# Patient Record
Sex: MALE | Race: WHITE | NOT HISPANIC OR LATINO | Employment: OTHER | URBAN - METROPOLITAN AREA
[De-identification: names, ages, dates, MRNs, and addresses within clinical notes are randomized per-mention and may not be internally consistent; named-entity substitution may affect disease eponyms.]

---

## 2017-01-11 DIAGNOSIS — I48.91 ATRIAL FIBRILLATION (HCC): ICD-10-CM

## 2017-01-11 DIAGNOSIS — Z79.01 LONG TERM CURRENT USE OF ANTICOAGULANT: ICD-10-CM

## 2017-01-11 DIAGNOSIS — I35.0 NONRHEUMATIC AORTIC VALVE STENOSIS: ICD-10-CM

## 2017-01-12 ENCOUNTER — LAB CONVERSION - ENCOUNTER (OUTPATIENT)
Dept: OTHER | Facility: OTHER | Age: 77
End: 2017-01-12

## 2017-01-12 LAB
INR PPP: 2
PROTHROMBIN TIME: 20.2 SEC (ref 9–11.5)

## 2017-02-22 DIAGNOSIS — Z79.01 LONG TERM CURRENT USE OF ANTICOAGULANT: ICD-10-CM

## 2017-02-22 DIAGNOSIS — I48.91 ATRIAL FIBRILLATION (HCC): ICD-10-CM

## 2017-02-22 DIAGNOSIS — I35.0 NONRHEUMATIC AORTIC VALVE STENOSIS: ICD-10-CM

## 2017-04-04 ENCOUNTER — APPOINTMENT (OUTPATIENT)
Dept: HEART AND VASCULAR | Facility: CLINIC | Age: 77
End: 2017-04-04

## 2017-04-04 VITALS
BODY MASS INDEX: 24.5 KG/M2 | WEIGHT: 175 LBS | SYSTOLIC BLOOD PRESSURE: 153 MMHG | HEART RATE: 63 BPM | HEIGHT: 71 IN | DIASTOLIC BLOOD PRESSURE: 67 MMHG

## 2017-04-05 DIAGNOSIS — I48.91 ATRIAL FIBRILLATION (HCC): ICD-10-CM

## 2017-04-05 DIAGNOSIS — I35.0 NONRHEUMATIC AORTIC VALVE STENOSIS: ICD-10-CM

## 2017-04-05 DIAGNOSIS — Z79.01 LONG TERM CURRENT USE OF ANTICOAGULANT: ICD-10-CM

## 2017-05-26 ENCOUNTER — LAB CONVERSION - ENCOUNTER (OUTPATIENT)
Dept: OTHER | Facility: OTHER | Age: 77
End: 2017-05-26

## 2017-05-26 LAB
INR PPP: 2.4
PROTHROMBIN TIME: 24.7 SEC (ref 9–11.5)

## 2017-05-31 ENCOUNTER — ALLSCRIPTS OFFICE VISIT (OUTPATIENT)
Dept: OTHER | Facility: OTHER | Age: 77
End: 2017-05-31

## 2017-05-31 ENCOUNTER — GENERIC CONVERSION - ENCOUNTER (OUTPATIENT)
Dept: OTHER | Facility: OTHER | Age: 77
End: 2017-05-31

## 2017-05-31 DIAGNOSIS — I42.9 CARDIOMYOPATHY (HCC): ICD-10-CM

## 2017-05-31 DIAGNOSIS — E78.00 PURE HYPERCHOLESTEROLEMIA: ICD-10-CM

## 2017-06-28 ENCOUNTER — LAB CONVERSION - ENCOUNTER (OUTPATIENT)
Dept: OTHER | Facility: OTHER | Age: 77
End: 2017-06-28

## 2017-06-28 ENCOUNTER — GENERIC CONVERSION - ENCOUNTER (OUTPATIENT)
Dept: OTHER | Facility: OTHER | Age: 77
End: 2017-06-28

## 2017-06-28 LAB
A/G RATIO (HISTORICAL): 1.6 (CALC) (ref 1–2.5)
ALBUMIN SERPL BCP-MCNC: 4.2 G/DL (ref 3.6–5.1)
ALP SERPL-CCNC: 114 U/L (ref 40–115)
ALT SERPL W P-5'-P-CCNC: 28 U/L (ref 9–46)
AST SERPL W P-5'-P-CCNC: 31 U/L (ref 10–35)
BASOPHILS # BLD AUTO: 0.4 %
BASOPHILS # BLD AUTO: 31 CELLS/UL (ref 0–200)
BILIRUB SERPL-MCNC: 0.9 MG/DL (ref 0.2–1.2)
BUN SERPL-MCNC: 18 MG/DL (ref 7–25)
BUN/CREA RATIO (HISTORICAL): 13 (CALC) (ref 6–22)
CALCIUM SERPL-MCNC: 9.4 MG/DL (ref 8.6–10.3)
CHLORIDE SERPL-SCNC: 106 MMOL/L (ref 98–110)
CHOLEST SERPL-MCNC: 141 MG/DL (ref 125–200)
CHOLEST/HDLC SERPL: 2.8 (CALC)
CO2 SERPL-SCNC: 24 MMOL/L (ref 20–31)
CREAT SERPL-MCNC: 1.39 MG/DL (ref 0.7–1.18)
DEPRECATED RDW RBC AUTO: 14.4 % (ref 11–15)
EGFR AFRICAN AMERICAN (HISTORICAL): 56 ML/MIN/1.73M2
EGFR-AMERICAN CALC (HISTORICAL): 49 ML/MIN/1.73M2
EOSINOPHIL # BLD AUTO: 0.8 %
EOSINOPHIL # BLD AUTO: 62 CELLS/UL (ref 15–500)
GAMMA GLOBULIN (HISTORICAL): 2.7 G/DL (CALC) (ref 1.9–3.7)
GLUCOSE (HISTORICAL): 101 MG/DL (ref 65–99)
HCT VFR BLD AUTO: 43.7 % (ref 38.5–50)
HDLC SERPL-MCNC: 50 MG/DL
HGB BLD-MCNC: 14.2 G/DL (ref 13.2–17.1)
LDL CHOLESTEROL (HISTORICAL): 68 MG/DL (CALC)
LYMPHOCYTES # BLD AUTO: 2020 CELLS/UL (ref 850–3900)
LYMPHOCYTES # BLD AUTO: 25.9 %
MCH RBC QN AUTO: 29.4 PG (ref 27–33)
MCHC RBC AUTO-ENTMCNC: 32.5 G/DL (ref 32–36)
MCV RBC AUTO: 90.3 FL (ref 80–100)
MONOCYTES # BLD AUTO: 569 CELLS/UL (ref 200–950)
MONOCYTES (HISTORICAL): 7.3 %
NEUTROPHILS # BLD AUTO: 5117 CELLS/UL (ref 1500–7800)
NEUTROPHILS # BLD AUTO: 65.6 %
NON-HDL-CHOL (CHOL-HDL) (HISTORICAL): 91 MG/DL (CALC)
PLATELET # BLD AUTO: 184 THOUSAND/UL (ref 140–400)
PMV BLD AUTO: 9.4 FL (ref 7.5–12.5)
POTASSIUM SERPL-SCNC: 4.3 MMOL/L (ref 3.5–5.3)
RBC # BLD AUTO: 4.84 MILLION/UL (ref 4.2–5.8)
SODIUM SERPL-SCNC: 141 MMOL/L (ref 135–146)
TOTAL PROTEIN (HISTORICAL): 6.9 G/DL (ref 6.1–8.1)
TRIGL SERPL-MCNC: 115 MG/DL
TSH SERPL DL<=0.05 MIU/L-ACNC: 1.32 MIU/L (ref 0.4–4.5)
WBC # BLD AUTO: 7.8 THOUSAND/UL (ref 3.8–10.8)

## 2017-07-28 DIAGNOSIS — I48.91 ATRIAL FIBRILLATION (HCC): ICD-10-CM

## 2017-07-28 DIAGNOSIS — Z79.01 LONG TERM CURRENT USE OF ANTICOAGULANT: ICD-10-CM

## 2017-08-12 ENCOUNTER — LAB CONVERSION - ENCOUNTER (OUTPATIENT)
Dept: OTHER | Facility: OTHER | Age: 77
End: 2017-08-12

## 2017-08-12 LAB
INR PPP: 2.9
PROTHROMBIN TIME: 29.1 SEC (ref 9–11.5)

## 2017-08-30 ENCOUNTER — LAB CONVERSION - ENCOUNTER (OUTPATIENT)
Dept: OTHER | Facility: OTHER | Age: 77
End: 2017-08-30

## 2017-08-30 LAB
INR PPP: 3.8
PROTHROMBIN TIME: 38.8 SEC (ref 9–11.5)

## 2017-09-14 ENCOUNTER — LAB CONVERSION - ENCOUNTER (OUTPATIENT)
Dept: OTHER | Facility: OTHER | Age: 77
End: 2017-09-14

## 2017-09-14 LAB
INR PPP: 4.7
PROTHROMBIN TIME: 47.9 SEC (ref 9–11.5)

## 2017-09-27 ENCOUNTER — ALLSCRIPTS OFFICE VISIT (OUTPATIENT)
Dept: OTHER | Facility: OTHER | Age: 77
End: 2017-09-27

## 2017-09-27 DIAGNOSIS — I25.10 ATHEROSCLEROTIC HEART DISEASE OF NATIVE CORONARY ARTERY WITHOUT ANGINA PECTORIS: ICD-10-CM

## 2017-09-27 DIAGNOSIS — I47.2 VENTRICULAR TACHYCARDIA (HCC): ICD-10-CM

## 2017-09-27 DIAGNOSIS — E78.00 PURE HYPERCHOLESTEROLEMIA: ICD-10-CM

## 2017-09-27 DIAGNOSIS — I10 ESSENTIAL (PRIMARY) HYPERTENSION: ICD-10-CM

## 2017-10-03 ENCOUNTER — APPOINTMENT (OUTPATIENT)
Dept: HEART AND VASCULAR | Facility: CLINIC | Age: 77
End: 2017-10-03
Payer: OTHER MISCELLANEOUS

## 2017-10-03 VITALS
SYSTOLIC BLOOD PRESSURE: 140 MMHG | BODY MASS INDEX: 24.5 KG/M2 | HEART RATE: 84 BPM | DIASTOLIC BLOOD PRESSURE: 76 MMHG | HEIGHT: 71 IN | WEIGHT: 175 LBS

## 2017-10-03 PROCEDURE — 93282 PRGRMG EVAL IMPLANTABLE DFB: CPT
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
## 2017-10-28 ENCOUNTER — GENERIC CONVERSION - ENCOUNTER (OUTPATIENT)
Dept: OTHER | Facility: OTHER | Age: 77
End: 2017-10-28

## 2017-11-16 DIAGNOSIS — I35.9 NONRHEUMATIC AORTIC VALVE DISORDER: ICD-10-CM

## 2017-11-17 ENCOUNTER — GENERIC CONVERSION - ENCOUNTER (OUTPATIENT)
Dept: OTHER | Facility: OTHER | Age: 77
End: 2017-11-17

## 2017-11-30 DIAGNOSIS — E78.00 PURE HYPERCHOLESTEROLEMIA: ICD-10-CM

## 2017-11-30 DIAGNOSIS — I48.91 ATRIAL FIBRILLATION (HCC): ICD-10-CM

## 2017-12-06 ENCOUNTER — GENERIC CONVERSION - ENCOUNTER (OUTPATIENT)
Dept: OTHER | Facility: OTHER | Age: 77
End: 2017-12-06

## 2017-12-20 ENCOUNTER — ALLSCRIPTS OFFICE VISIT (OUTPATIENT)
Dept: OTHER | Facility: OTHER | Age: 77
End: 2017-12-20
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
## 2018-01-05 DIAGNOSIS — I48.91 ATRIAL FIBRILLATION (HCC): ICD-10-CM

## 2018-01-05 DIAGNOSIS — Z79.01 LONG TERM CURRENT USE OF ANTICOAGULANT: ICD-10-CM

## 2018-01-10 ENCOUNTER — GENERIC CONVERSION - ENCOUNTER (OUTPATIENT)
Dept: OTHER | Facility: OTHER | Age: 78
End: 2018-01-10

## 2018-01-10 NOTE — RESULT NOTES
Verified Results  (1) PT WITH INR 21Jun2016 01:38PM Precious Acevedo   REPORT COMMENT:  FASTING:NO     Test Name Result Flag Reference   INR 1 7 H    Reference Range                     0 9-1 1  Moderate-intensity Warfarin Therapy 2 0-3 0  Higher-intensity Warfarin Therapy   3 0-4 0   PT 17 3 sec H 9 0-11 5   For more information on this test, go to:  [email or web address]

## 2018-01-11 NOTE — RESULT NOTES
Verified Results  (1) PT WITH INR 22Apr2016 02:45PM Precious Aceevdo     Test Name Result Flag Reference   INR 1 5 H    Reference Range                     0 9-1 1  Moderate-intensity Warfarin Therapy 2 0-3 0  Higher-intensity Warfarin Therapy   3 0-4 0   PT 15 5 sec H 9 0-11 5   For more information on this test, go to:  [email or web address]

## 2018-01-11 NOTE — RESULT NOTES
Verified Results  (1) PT WITH INR 86EUG5248 01:34PM Precious Acevedo   REPORT COMMENT:  FASTING:NO     Test Name Result Flag Reference   INR 2 5 H    Reference Range                     0 9-1 1  Moderate-intensity Warfarin Therapy 2 0-3 0  Higher-intensity Warfarin Therapy   3 0-4 0   PT 26 6 sec H 9 0-11 5   For more information on this test, go to:  [email or web address]/[email or web address]

## 2018-01-12 NOTE — RESULT NOTES
Verified Results  (1) PT WITH INR 70Pxa0414 03:06PM Precious Acveedo   REPORT COMMENT:  FASTING:NO     Test Name Result Flag Reference   INR 5 8 H    Verified by repeat analysis  Reference Range                     0 9-1 1  Moderate-intensity Warfarin Therapy 2 0-3 0  Higher-intensity Warfarin Therapy   3 0-4 0   PT 58 1 sec H 9 0-11 5   Verified by repeat analysis  For more information on this test, go to:  [email or web address]

## 2018-01-12 NOTE — RESULT NOTES
Verified Results  * CT CHEST WO CONTRAST 56SAF7475 01:53PM Catherine Brenner Order Number: [id number]    - Patient Instructions: To schedule this appointment, please contact Central Scheduling at [phone]  Test Name Result Flag Reference   CT CHEST WO CONTRAST (Report)     CT CHEST WITHOUT IV CONTRAST     INDICATION: Follow-up aortic root dilatation  COMPARISON: CT performed March 2, 2011 from [facility]: CT examination of the chest was performed without intravenous contrast  Axial, sagittal and coronal reformatted images were submitted for interpretation  Coronal thick section MIP (maximal intensity projection) images were also created  This examination, like all CT scans performed in the [place], was performed utilizing techniques to minimize radiation dose exposure, including the use of iterative reconstruction and automated exposure control  FINDINGS:   LUNGS: Stable parenchymal scarring and small bullae  PLEURA: Stable subpleural fat  No significant pleural effusion  HEART/GREAT VESSELS: Mild cardiomegaly  Ascending thoracic aorta measures 4 5 cm, previously 4 5 cm in March 2011  Aortic atherosclerosis  Apparent stent in the left renal artery  Cardiac pacer lead present  MEDIASTINUM AND GÓMEZ: Unremarkable  CHEST WALL AND LOWER NECK:      VISUALIZED STRUCTURES IN THE UPPER ABDOMEN: Stable small low-attenuation lesions in the liver, likely cysts  OSSEOUS STRUCTURES: Prior median sternotomy  IMPRESSION:   Stable exam compared to March 2011       Workstation performed: [id number]     Signed by:    Laura Coleman DO   11/19/16

## 2018-01-12 NOTE — RESULT NOTES
Verified Results  ECHO COMPLETE WITH CONTRAST IF INDICATED [id number] 01:49PM Tatum Duggan Order Number: [id number]    - Patient Instructions: To schedule this appointment, please contact Central Scheduling at [phone]  For Maria Luisa Medrano, please call [phone]  Test Name Result Flag Reference   ECHO COMPLETE WITH CONTRAST IF INDICATED (Report)     [address] [facility]   [phone]     Transthoracic Echocardiogram   2D, M-mode, Doppler, and Color Doppler     Study date: 15-Nov-2016     Patient: Geovanni Irizarry   MR number: [id number]   Account number: [de-identified]   : 1940   Age: 67 years   Gender: Male   Status: Outpatient   Location: [facility]   Height: 71 in   Weight: 177 lb   BP: 152/ 82 mmHg     Indications: Aortic valve disorder  Diagnoses: I35 9 - Nonrheumatic aortic valve disorder, unspecified     Sonographer: Campos RCS   Primary Physician: Cecelia Patel MD   Referring Physician: Connie Anthony MD   Group: [facility]   Interpreting Physician: Connie Anthony MD     SUMMARY     LEFT VENTRICLE:   Ejection fraction was estimated to be 55 %  There were no regional wall motion abnormalities  There was mild to moderate concentric hypertrophy  RIGHT VENTRICLE:   The ventricle was mildly dilated  Estimated peak pressure was at least 25 mmHg  ICD lead noted  LEFT ATRIUM:   The atrium was mildly dilated  RIGHT ATRIUM:   The atrium was mildly dilated  AORTIC VALVE:   A mechanical prosthesis was present  It exhibited normal function  TRICUSPID VALVE:   There was mild regurgitation  AORTA:   The root exhibited mild dilatation  4 3 cm  HISTORY: PRIOR HISTORY: COPD  A Fib  CAD  V-Tach  Former smoker  Congestive   heart failure  Cardiomyopathy  Risk factors: hypertension and   medication-treated hypercholesterolemia   PRIOR PROCEDURES: ICD implantation  Mechanical prosthesis of aortic valve  PROCEDURE: The study was performed in the [address] [place] [address]  This   was a routine study  The transthoracic approach was used  The study included   complete 2D imaging, M-mode, complete spectral Doppler, and color Doppler  Images were obtained from the parasternal, apical, subcostal, and suprasternal   notch acoustic windows  Image quality was adequate  LEFT VENTRICLE: Size was normal  Ejection fraction was estimated to be 55 %  There were no regional wall motion abnormalities  There was mild to moderate   concentric hypertrophy  RIGHT VENTRICLE: The ventricle was mildly dilated  Systolic function was   normal  Wall thickness was normal  DOPPLER: Estimated peak pressure was at   least 25 mmHg  ICD lead noted  LEFT ATRIUM: The atrium was mildly dilated  RIGHT ATRIUM: The atrium was mildly dilated  MITRAL VALVE: Valve structure was normal  There was normal leaflet separation  DOPPLER: The transmitral velocity was within the normal range  There was no   evidence for stenosis  There was no regurgitation  AORTIC VALVE: A mechanical prosthesis was present  It exhibited normal   function  TRICUSPID VALVE: The valve structure was normal  There was normal leaflet   separation  DOPPLER: The transtricuspid velocity was within the normal range  There was no evidence for stenosis  There was mild regurgitation  PULMONIC VALVE: Leaflets exhibited normal thickness, no calcification, and   normal cuspal separation  DOPPLER: The transpulmonic velocity was within the   normal range  There was no regurgitation  PERICARDIUM: There was no pericardial effusion  The pericardium was normal in   appearance  AORTA: The root exhibited mild dilatation  4 3 cm  SYSTEM MEASUREMENT TABLES     Apical four chamber   4 chamber Left Atrium Volume Index; Planimetry; End Systole;  Apical four   chamber;: 21 cm2 Left Ventricular End Diastolic Volume; Method of Disks, Single   Plane; Apical four chamber;: 108 2 ml Left Ventricular End Systolic Volume;   Method of Disks, Single Plane; Apical four chamber;: 48 5 ml Right Atrium   Systolic Area; Planimetry; End Systole; Apical four chamber;: 20 16 cm2 Right   Ventricular Internal Diastolic Dimension; End Diastole; Apical four chamber;:   39 4 mm     Unspecified Scan Mode   AR Vmax; Regurgitant Flow; Diastole;: 288 2 cm/s   Aortic Root Diameter; End Systole;: 43 5 mm   Gradient Pressure, Peak; Simplified Bernoulli; Antegrade Flow; Systole;: 9 7   mm[Hg] Gradient pressure, average; Simplified Bernoulli; Antegrade Flow;   Systole;: 5 6 mm[Hg]   Left atrial diameter; End Diastole;: 40 1 mm   Interventricular Septum Diastolic Thickness; Teichholz; End Diastole;: 13 4 mm   Interventricular Septum Systolic Thickness; Teichholz; End Systole;: 18 3 mm   Left Ventricle Internal End Diastolic Dimension; Teichholz;: 48 2 mm   Left Ventricle Internal Systolic Dimension; Teichholz; End Systole;: 34 2 mm   Left Ventricle Posterior Wall Diastolic Thickness; Teichholz; End Diastole;:   13 7 mm Left Ventricle Posterior Wall Systolic Thickness; Teichholz; End   Systole;: 19 6 mm   Left Ventricular Ejection Fraction; Teichholz;: 55 7 %   Mitral Valve Area; Area by Pressure Half-Time; Systole;: 3 01 cm2   Mitral Valve E to A Ratio; Systole;: 1 37   Maximum Tricuspid valve regurgitation pressure gradient; Regurgitant Flow;   Systole;: 20 mm[Hg]     [facility]     Prepared and electronically signed by     Vilma Banks MD   Signed [id number] 17:03:27       Plan  Thoracic aortic ectasia    · * CT CHEST WO CONTRAST; Status:Need Information - Financial Authorization;   Requested for:[id number];

## 2018-01-14 NOTE — PROGRESS NOTES
REPORT NAME: Patient Visit Summary Report   VISIT DATE: 11/8/2016  VISIT TIME: 4:25 PM EST  PATIENT NAME: Ashlee Garvin   MEDICAL RECORD NUMBER: [id number]  SOCIAL SECURITY NUMBER:    YOB: 1940  AGE: 67  REFERRING PHYSICIAN: Precious Acevedo  SUPERVISING CLINICIAN: Precious Acevedo  HEALTH CARE PROFESSIONAL: Hector Ramachandran  PATIENT HOME ADDRESS: [address] [place] [address], [place] [address]  PATIENT HOME PHONE:  [phone]  DIAGNOSIS 1: Atrial Fibrillation / 427 31  DIAGNOSIS 2: Long-term (current) use of anticoagulants / V58 61  DIAGNOSIS 3:   DIAGNOSIS 4:   INR RANGE: 2 5 - 3 5  INR GOAL: 3  TREATMENT START DATE:   TREATMENT END  DATE:   NEXT VISIT:       VISIT RESULTS   ENCOUNTER NUMBER:   [facility]  TEST TYPE: Outside Lab (Venipuncture)  VISIT TYPE:   CURRENT INR: 2 4 PROTIME:   SPECIMEN COL AND RPT DATE: 11/8/2016 4:25 PM   EST    VITAL SIGNS  PULSE:  B/P:  WEIGHT:  HEIGHT:  TEMP:     CURRENT ANTICOAGULANT DOSING SCHEDULE  DOSE SIZE: 2 5mg    ANTICOAGULANT TYPE: COUMADIN  DOSING REGIMEN  Sun       Mon       Tues      Wed       Thurs     Fri       Sat   Total/Wk  5         5         2 5       7 5       5         5         2 5       32 5    PATIENT MEDICATION INSTRUCTION: Yes  PATIENT NUTRITIONAL COUNSELING: No  PATIENT BRUISING INSTRUCTION: No      LAST EDUCATION DATE:        PREVIOUS VISIT INFORMATION  VISITDATE   INR Goal  INR   Sun     Mon     Tues    Wed     Thurs   Fri  Sat     Total/wk  11/8/2016   3         2 4   5       5       2 5     7 5     5       5  2 5     32 5  11/8/2016   3         2 4   5        5       2 5     7 5     5       5  2 5     32 5  10/7/2016   3         2 8   5       5       2 5     7 5     5       5  2 5     32 5  8/3/2016    3         3     5       5       2 5     7 5     5       5  2 5     32 5    ADDITIONAL  PREVIOUS VISIT INFORMATION  VISITDATE   PRIMARY RX               DOSE      CrCl  11/8/2016   COUMADIN 2 5mg               11/8/2016   COUMADIN                 2 5mg               10/7/2016   COUMADIN                 2 5mg                8/3/2016    COUMADIN                 2 5mg                   OTHER CURRENT MEDICATIONS: COUMADIN      PROGRESS NOTES: Same dose  recheck 2 weeks    PATIENT INSTRUCTIONS: spoke with pt    TEST LOCATION: [facility]    Electronically signed by: Latrell Salmeron on 11/8/2016 at 4:25 PM EST              Electronically signed by:Precious CASTILLO    Nov 9 2016 12:16AM EST

## 2018-01-14 NOTE — RESULT NOTES
Verified Results  (1) PT WITH INR [id number] 10:29AM Precious Acevedo   REPORT COMMENT:  FASTING:NO     Test Name Result Flag Reference   INR 3 1 H    Reference Range                     0 9-1 1  Moderate-intensity Warfarin Therapy 2 0-3 0  Higher-intensity Warfarin Therapy   3 0-4 0   PT 31 7 sec H 9 0-11 5   For more information on this test, go to:  [email or web address]

## 2018-01-15 VITALS
HEIGHT: 71 IN | SYSTOLIC BLOOD PRESSURE: 138 MMHG | OXYGEN SATURATION: 98 % | HEART RATE: 64 BPM | WEIGHT: 175 LBS | DIASTOLIC BLOOD PRESSURE: 82 MMHG | BODY MASS INDEX: 24.5 KG/M2

## 2018-01-15 VITALS
BODY MASS INDEX: 24.5 KG/M2 | WEIGHT: 175 LBS | HEART RATE: 74 BPM | SYSTOLIC BLOOD PRESSURE: 154 MMHG | OXYGEN SATURATION: 98 % | HEIGHT: 71 IN | DIASTOLIC BLOOD PRESSURE: 80 MMHG

## 2018-01-15 NOTE — MISCELLANEOUS
Message  GI Reminder Recall [address] [place] [address] [address]:   Date: 06/01/2017   Dear Kenyetta Stokes:     Review of our records shows you are due for the following: Colonoscopy  Our records indicate that you are due at this time to have a follow-up examination for a colonoscopy  As you now, these tests are done to prevent colon cancer, a very common disease in the [place] and responsible for the thousands of patient deaths each year  We at [facility] are concerned for your health, and would very much appreciate you getting in touch with us at your earliest convenience, Again, this examination is vital to your proper health maintenance and for the prevention of cancer  Please call the following office to schedule your appointment:   [address] [phone]  We look forward to hearing from you!      Sincerely,         Signatures   Electronically signed by : Anne Marie Anderson, ; Jun 1 2017  8:31AM EST                       (Author)

## 2018-01-15 NOTE — RESULT NOTES
Verified Results  (1) PT WITH INR 01Apr2016 11:27AM Kristina Precious     Test Name Result Flag Reference   INR 2 10 L 2 500 - 3 500   REPORT NAME: Patient Visit Summary Report   VISIT DATE: 4/1/2016  VISIT TIME: 11:27 AM EDT  PATIENT NAME: Sandy Miller   MEDICAL RECORD NUMBER: [id number]  SOCIAL SECURITY NUMBER:   YOB: 1940  AGE: 67  REFERRING PHYSICIAN: Precious Acevedo  SUPERVISING CLINICIAN: Precious Acevedo  HEALTH CARE PROFESSIONAL: Addy Espitia   PATIENT HOME ADDRESS: [address] [place] [address]  PATIENT HOME PHONE: [phone]  DIAGNOSIS 1: Atrial Fibrillation / 427 31  DIAGNOSIS 2: Long-term (current) use of anticoagulants / V58 61  DIAGNOSIS 3:   DIAGNOSIS 4:   INR RANGE: 2 5 - 3 5  INR GOAL: 3  TREATMENT START DATE:   TREATMENT END DATE:   NEXT VISIT: 4/22/2016  [facility]    VISIT RESULTS   ENCOUNTER NUMBER:   TEST LOCATION: [facility]  TEST TYPE: Outside Lab (Venipuncture)  VISIT TYPE:   CURRENT INR: 2 1 PROTIME:   SPECIMEN COL AND RPT DATE: 4/1/2016 11:27 AM  EDT    VITAL SIGNS  PULSE:  B/P:  WEIGHT:  HEIGHT:  TEMP:     CURRENT ANTICOAGULANT DOSING SCHEDULE  DOSE SIZE: 2 5mg    ANTICOAGULANT TYPE: COUMADIN  DOSING REGIMEN  Sun       Mon       Tues      Wed       Thurs     Fri       Sat  Total/Wk  5         5         5         2 5       5         5         5         32 5    PATIENT MEDICATION INSTRUCTION: Yes  PATIENT NUTRITIONAL COUNSELING: No  PATIENT BRUISING INSTRUCTION: No      LAST EDUCATION DATE:       PREVIOUS VISIT INFORMATION  VISITDATE   INR Goal  INR   Sun     Mon     Tues    Wed     Thurs   Fri  Sat     Total/wk  4/1/2016    3         2 1   5       5       5       2 5     5       5  5       32 5  3/9/2016    3         3 1   5       5       5       2 5     5       5  5       32 5  2/9/2016    3         3 9   5       5       5       2 5     5       5  5       32 5  1/12/2016   3         2 5   5       5       5       2 5     5 5  5       32 5    ADDITIONAL PREVIOUS VISIT INFORMATION  VISITDATE   PRIMARY RX               DOSE      CrCl  4/1/2016    COUMADIN                 2 5mg               3/9/2016    COUMADIN                 2 5mg               2/9/2016    COUMADIN                 2 5mg               1/12/2016   COUMADIN                 2 5mg                   OTHER CURRENT MEDICATIONS: COUMADIN      PROGRESS NOTES: same dose   recheck 2-3 weeks - per AS    PATIENT INSTRUCTIONS: lmom on daughters cell#    TEST LOCATION: [facility]    Electronically signed by: Christopher Dubois  on 4/1/2016 at 11:27 AM EDT

## 2018-01-15 NOTE — RESULT NOTES
Verified Results  NM MYOCARDIAL PERFUSION SPECT (RX STRESS AND/OR REST) [id number] 12:39PM Sameera Urrutia Order Number: [id number]     Test Name Result Flag Reference   NM MYOCARDIAL PERFUSION SPECT (RX STRESS AND/OR REST) (Report)     [facility]   [address] [place] [address]   [phone]     Rest/Stress Gated SPECT Myocardial Perfusion Imaging After Regadenoson     Patient: Pollo Cobb   MR number: [id number]   Account number: [de-identified]   : 1940   Age: 67 years   Gender: Male   Status: Outpatient   Location: [facility]   Height: 71 in   Weight: 177 lb   BP: 146/ 74 mmHg     Allergies: ALLERGIES NOT ON FILE     Diagnosis: I25 10 - Atherosclerotic heart disease of native coronary artery   without angina pectoris, I48 0 - Atrial fibrillation     Primary Physician: Aiyana Bangura MD   Referring Physician: Shashi Sánchez MD   Technician: Chino Don BS, BA, AART(N)   Group: [facility]   Other: Bruce Steven MS, CCT   Interpreting Physician: Roland Longoria MD     INDICATIONS: Evaluation of known coronary artery disease  HISTORY: The patient is a 67year old  male  Chest pain status: no   chest pain  Coronary artery disease risk factors: dyslipidemia and   hypertension  Cardiovascular history: coronary artery disease, congestive heart   failure, aortic valve disease- AVR, and arrhythmia  Prior cardiovascular   procedures: implantable cardioverter defibrillator procedure  Co-morbidity:   history of lung disease  GERD, KELTON Medications: a beta blocker, a calcium   channel blocker, an ACE inhibitor/ARB, a diuretic, a lipid lowering agent,   warfarin, and an antiarrhythmic agent  PHYSICAL EXAM: Baseline physical exam screening: normal      REST ECG: Ventricular paced rhythm alternating with sinus rhythm with first   degree AV block       PROCEDURE: The study was performed at [address] [place] [address] [place]  A   regadenoson infusion pharmacologic stress test was performed  Gated SPECT   myocardial perfusion imaging was performed after stress and at rest  Systolic   blood pressure was 146 mmHg, at the start of the study  Diastolic blood   pressure was 74 mmHg, at the start of the study  The heart rate was 50 bpm, at   the start of the study  Patient was not experiencing chest pain at the time of   the injection of the radiopharmaceutical    Regadenoson protocol:   HR bpm SBP mmHg DBP mmHg Symptoms ST change Rhythm/conduct   Baseline 50 146 74 none none paced   Immediate 67 -- -- mild dyspnea same as above NSR, rare PAC's   1 min 75 134 84 subsiding same as above NSR, no ectopy   2 min 71 138 76 none same as above NSR, no ectopy   3 min 66 -- -- none same as above NSR, no ectopy   4 min 65 124 66 none same as above NSR, no ectopy   No medications or fluids given  STRESS SUMMARY: Duration of pharmacologic stress was 1 min  Maximal heart rate   during stress was 75 bpm  The heart rate response to stress was normal  There   was normal resting blood pressure with an appropriate response to stress  The   rate-pressure product for the peak heart rate and blood pressure was 89048  There was no chest pain during stress  The stress test was terminated due to   protocol completion  The stress ECG was negative for ischemia  Arrhythmia   during stress: isolated atrial premature beats  ISOTOPE ADMINISTRATION:   Resting isotope administration Stress isotope administration   Agent Sestamibi Sestamibi   Dose 9 62 mCi --   Date 05/31/2016 --     The radiopharmaceutical was injected at the peak effect of pharmacologic   stress  MYOCARDIAL PERFUSION IMAGING:   The image quality was good  The left ventricle was mildly dilated  PERFUSION DEFECTS:   - There were no perfusion defects     A perfusion defect in the basal-mid inferior wall was seen in the rest images   but not seen in the stress images indicating that it was due to an artifact  GATED SPECT:   The calculated left ventricular ejection fraction was 48 %  Left ventricular   ejection fraction was mildly decreased by visual estimate  There was mildly   reduced myocardial thickening and motion of the LV with asynchronous septal   motion suggestive of paced rhythm  SUMMARY:   - Stress results: There was no chest pain during stress  - ECG conclusions: The stress ECG was negative for ischemia  Arrhythmia during   stress: isolated atrial premature beats  - Perfusion imaging: There were no perfusion defects  A perfusion defect in the basal-mid inferior wall was seen in the rest images   but not seen in the stress images indicating that it was due to an artifact  -    Gated SPECT: The calculated left ventricular ejection fraction was 48 %  Left   ventricular ejection fraction was mildly decreased by visual estimate  There   was mildly reduced myocardial thickening and motion of the LV with asynchronous   septal motion suggestive of paced rhythm  IMPRESSIONS: Normal study after pharmacologic stress  Myocardial perfusion   imaging was normal at rest and with stress  Left ventricular systolic function   was reduced, with regional wall motion abnormalities (asynchronous septal   motion)       Prepared and signed by     Linnea Villanueva MD   Signed 05/31/2016 17:42:32

## 2018-01-15 NOTE — RESULT NOTES
Verified Results  (1) PT WITH INR [id number] 01:09PM Precious Acevedo     Test Name Result Flag Reference   INR 2 2 H    Reference Range                     0 9-1 1  Moderate-intensity Warfarin Therapy 2 0-3 0  Higher-intensity Warfarin Therapy   3 0-4 0   PT 21 8 sec H 9 0-11 5   For more information on this test, go to:  [email or web address]

## 2018-01-16 NOTE — RESULT NOTES
Verified Results  (1) CBC/PLT/DIFF [id number] 11:10AM [facility]     Test Name Result Flag Reference   WHITE BLOOD CELL COUNT 8 7 Thousand/uL  3 8-10 8   RED BLOOD CELL COUNT 4 84 Million/uL  4 20-5 80   HEMOGLOBIN 14 1 g/dL  13 2-17 1   HEMATOCRIT 44 5 %  38 5-50 0   MCV 91 9 fL  80 0-100 0   MCH 29 1 pg  27 0-33 0   EOSINOPHILS 1 0 %     BASOPHILS 0 2 %     ABSOLUTE MONOCYTES 748 cells/uL  200-950   ABSOLUTE EOSINOPHILS 87 cells/uL     ABSOLUTE BASOPHILS 17 cells/uL  0-200   NEUTROPHILS 73 5 %     LYMPHOCYTES 16 7 %     MONOCYTES 8 6 %     MCHC 31 7 g/dL L 32 0-36 0   RDW 14 6 %  11 0-15 0   PLATELET COUNT 598 Thousand/uL  140-400   MPV 9 5 fL  7 5-11 5   ABSOLUTE NEUTROPHILS 6395 cells/uL  7533-9911   ABSOLUTE LYMPHOCYTES 1453 cells/uL  850-3900     (1) COMPREHENSIVE METABOLIC PANEL [id number] 82:89UU [facility]     Test Name Result Flag Reference   GLUCOSE 99 mg/dL  65-99   Fasting reference interval   UREA NITROGEN (BUN) 22 mg/dL  7-25   CREATININE 1 21 mg/dL H 0 70-1 18   For patients >52years of age, the reference limit  for Creatinine is approximately 13% higher for people  identified as -American  eGFR NON-AFR   AMERICAN 57 mL/min/1 73m2 L > OR = 60   eGFR AFRICAN AMERICAN 67 mL/min/1 73m2  > OR = 60   AST 26 U/L  10-35   ALT 29 U/L  9-46   PROTEIN, TOTAL 6 3 g/dL  6 1-8 1   ALBUMIN 3 9 g/dL  3 6-5 1   GLOBULIN 2 4 g/dL (calc)  1 9-3 7   ALBUMIN/GLOBULIN RATIO 1 6 (calc)  1 0-2 5   BILIRUBIN, TOTAL 0 8 mg/dL  0 2-1 2   ALKALINE PHOSPHATASE 67 U/L     BUN/CREATININE RATIO 18 (calc)  6-22   SODIUM 139 mmol/L  135-146   POTASSIUM 4 2 mmol/L  3 5-5 3   CHLORIDE 105 mmol/L     CARBON DIOXIDE 28 mmol/L  20-31   CALCIUM 9 1 mg/dL  8 6-10 3     (1) LIPID PANEL, FASTING [id number] 11:10AM [facility]     Test Name Result Flag Reference   CHOLESTEROL, TOTAL 189 mg/dL  125-200   HDL CHOLESTEROL 54 mg/dL  > OR = 40   TRIGLICERIDES 281 mg/dL H <150   LDL-CHOLESTEROL 95 mg/dL (calc)  <130   Desirable range <100 mg/dL for patients with CHD or  diabetes and <70 mg/dL for diabetic patients with  known heart disease  CHOL/HDLC RATIO 3 5 (calc)  < OR = 5 0   NON HDL CHOLESTEROL 135 mg/dL (calc)     Target for non-HDL cholesterol is 30 mg/dL higher than   LDL cholesterol target  (1) PT WITH INR [id number] 11:10AM Precious Acevedo     Test Name Result Flag Reference   INR 3 4 H    Reference Range                     0 9-1 1  Moderate-intensity Warfarin Therapy 2 0-3 0  Higher-intensity Warfarin Therapy   3 0-4 0   PT 34 2 sec H 9 0-11 5   For more information on this test, go to:  [email or web address]/[email or web address]     (Q) TSH, 3RD GENERATION [id number] 11:10AM Precious Acevedo   REPORT COMMENT:  FASTING:YES     Test Name Result Flag Reference   TSH 0 43 mIU/L  0 40-4 50

## 2018-01-16 NOTE — MISCELLANEOUS
This is to state that this patient is followed by me from cardiac standpoint  He has multiple medical problems including cardiomyopathy, mechanical aortic valve replacement, history of ICD implantation  Patient also  has history of COPD, gastroesophageal reflux disease, hypertension and hyperlipidemia  Patient has had anxiety disorder associated with these medical problems for the past many years  He has been on alprazolam 0 25 milligrams twice a day as needed  This  has immensely help him with coping with all his medical illnesses  Patient also has been tried to wean off this anxiolytic  However, this has not been successful  In view of this, it is extremely important that this patient continue alprazolam 0 25mg  twice a day as needed  Discontinuation of this coverage will  put the patient's medical condition in jeopardy  If you have any specific questions, please do not hesitate to contact me in person  Electronically signed by:Precious CASTILLO    May  8 2016 10:46PM EST

## 2018-01-16 NOTE — RESULT NOTES
Verified Results  (1) PT WITH INR [id number] 09:16AM [facility]     Test Name Result Flag Reference   INR 2 40 L 2 500 - 3 500   REPORT NAME: Patient Visit Summary Report   VISIT DATE: 11/8/2016  VISIT TIME: 9:16 AM EST  PATIENT NAME: Dl Lomax   MEDICAL RECORD NUMBER: [id number]  SOCIAL SECURITY NUMBER:   YOB: 1940  AGE: 67  REFERRING PHYSICIAN: Precious Acevedo  SUPERVISING CLINICIAN: Precious Acevedo  HEALTH CARE PROFESSIONAL: Ambreen Rosas  PATIENT HOME ADDRESS: [address] [place] [address]  PATIENT HOME PHONE: [phone]  DIAGNOSIS 1: Atrial Fibrillation / 427 31  DIAGNOSIS 2: Long-term (current) use of anticoagulants / V58 61  DIAGNOSIS 3:   DIAGNOSIS 4:   INR RANGE: 2 5 - 3 5  INR GOAL: 3  TREATMENT START DATE:   TREATMENT END DATE:   NEXT VISIT:       VISIT RESULTS   ENCOUNTER NUMBER:   TEST LOCATION: [facility]  TEST TYPE: Outside Lab (Venipuncture)  VISIT TYPE:   CURRENT INR: 2 4 PROTIME:   SPECIMEN COL AND RPT DATE: 11/8/2016 9:16 AM  EST    VITAL SIGNS  PULSE:  B/P:  WEIGHT:  HEIGHT:  TEMP:     CURRENT ANTICOAGULANT DOSING SCHEDULE  DOSE SIZE: 2 5mg    ANTICOAGULANT TYPE: COUMADIN  DOSING REGIMEN  Sun       Mon       Tues      Wed       Thurs     Fri       Sat  Total/Wk  5         5         2 5       7 5       5         5         2 5       32 5    PATIENT MEDICATION INSTRUCTION: Yes  PATIENT NUTRITIONAL COUNSELING: No  PATIENT BRUISING INSTRUCTION: No      LAST EDUCATION DATE:       PREVIOUS VISIT INFORMATION  VISITDATE   INR Goal  INR   Sun     Mon     Tues    Wed     Thurs   Fri  Sat     Total/wk  11/8/2016   3         2 4   5       5       2 5     7 5     5       5  2 5     32 5  10/7/2016   3         2 8   5       5       2 5     7 5     5       5  2 5     32 5  8/3/2016    3         3     5       5       2 5     7 5     5       5  2 5     32 5  6/22/2016   3         1 7   5       5       2 5     7 5     5       5  2 5 32  5    ADDITIONAL PREVIOUS VISIT INFORMATION  VISITDATE   PRIMARY RX               DOSE      CrCl  11/8/2016   COUMADIN                 2 5mg               10/7/2016   COUMADIN                 2 5mg               8/3/2016    COUMADIN                 2 5mg               6/22/2016   COUMADIN                 2 5mg                   OTHER CURRENT MEDICATIONS: COUMADIN      PROGRESS NOTES: Same dose rechk 1 month    PATIENT INSTRUCTIONS: Spoke with daughter    TEST LOCATION: [facility]    Electronically signed by: Vanesa Galvez on 11/8/2016 at 9:16 AM EST

## 2018-01-17 NOTE — RESULT NOTES
Verified Results  (1) LIPID PANEL, FASTING [id number] 12:14PM [facility]     Test Name Result Flag Reference   CHOLESTEROL, TOTAL 141 mg/dL  125-200   HDL CHOLESTEROL 50 mg/dL  > OR = 40   TRIGLICERIDES 456 mg/dL  <150   LDL-CHOLESTEROL 68 mg/dL (calc)  <130   Desirable range <100 mg/dL for patients with CHD or  diabetes and <70 mg/dL for diabetic patients with  known heart disease  CHOL/HDLC RATIO 2 8 (calc)  < OR = 5 0   NON HDL CHOLESTEROL 91 mg/dL (calc)     Target for non-HDL cholesterol is 30 mg/dL higher than   LDL cholesterol target  (1) COMPREHENSIVE METABOLIC PANEL [id number] 57:44OJ [facility]     Test Name Result Flag Reference   GLUCOSE 101 mg/dL H 65-99   Fasting reference interval     For someone without known diabetes, a glucose value  between 100 and 125 mg/dL is consistent with  prediabetes and should be confirmed with a  follow-up test    UREA NITROGEN (BUN) 18 mg/dL  7-25   CREATININE 1 39 mg/dL H 0 70-1 18   For patients >52years of age, the reference limit  for Creatinine is approximately 13% higher for people  identified as -American  eGFR NON-AFR   AMERICAN 49 mL/min/1 73m2 L > OR = 60   eGFR AFRICAN AMERICAN 56 mL/min/1 73m2 L > OR = 60   BUN/CREATININE RATIO 13 (calc)  6-22   SODIUM 141 mmol/L  135-146   POTASSIUM 4 3 mmol/L  3 5-5 3   CHLORIDE 106 mmol/L     CARBON DIOXIDE 24 mmol/L  20-31   CALCIUM 9 4 mg/dL  8 6-10 3   PROTEIN, TOTAL 6 9 g/dL  6 1-8 1   ALBUMIN 4 2 g/dL  3 6-5 1   GLOBULIN 2 7 g/dL (calc)  1 9-3 7   ALBUMIN/GLOBULIN RATIO 1 6 (calc)  1 0-2 5   BILIRUBIN, TOTAL 0 9 mg/dL  0 2-1 2   ALKALINE PHOSPHATASE 114 U/L     AST 31 U/L  10-35   ALT 28 U/L  9-46     (1) CBC/PLT/DIFF [id number] 12:14PM [facility]     Test Name Result Flag Reference   WHITE BLOOD CELL COUNT 7 8 Thousand/uL  3 8-10 8   RED BLOOD CELL COUNT 4 84 Million/uL  4 20-5 80   HEMOGLOBIN 14 2 g/dL  13 2-17 1   HEMATOCRIT 43 7 %  38 5-50 0   MCV 90 3 fL  80 0-100 0 MCH 29 4 pg  27 0-33 0   MCHC 32 5 g/dL  32 0-36 0   RDW 14 4 %  11 0-15 0   PLATELET COUNT 857 Thousand/uL  140-400   ABSOLUTE NEUTROPHILS 5117 cells/uL  6173-1029   ABSOLUTE LYMPHOCYTES 2020 cells/uL  850-3900   ABSOLUTE MONOCYTES 569 cells/uL  200-950   ABSOLUTE EOSINOPHILS 62 cells/uL     ABSOLUTE BASOPHILS 31 cells/uL  0-200   NEUTROPHILS 65 6 %     LYMPHOCYTES 25 9 %     MONOCYTES 7 3 %     EOSINOPHILS 0 8 %     BASOPHILS 0 4 %     MPV 9 4 fL  7 5-12 5     (Q) TSH, 3RD GENERATION W/REFLEX TO FT4 [id number] 12:14PM Precious Acevedo   REPORT COMMENT:  FASTING:YES     Test Name Result Flag Reference   TSH W/REFLEX TO FT4 1 32 mIU/L  0 40-4 50

## 2018-01-17 NOTE — MISCELLANEOUS
This is to state that this patient has been followed by me for the past many years  He has history of ventricular tachycardia status post ICD implantation on amiodarone prophylaxis  He also has history of mechanical  aortic valve replacement on anticoagulation  In addition he has history of COPD, gastroesophageal reflux disease as well as hyperlipidemia, hypertension and anxiety  He has been followed on a regular basis and seen every 3 months  Patient has been well  maintained on medical therapy  Patient denies any history of chest pain  Does have some shortness of breath which is stable  Anxiety is well controlled on medications as well as emotional support  No history of palpitations, lightheadedness or dizziness  No history presyncope, syncope  No history of ICD discharges  Patient was seen in the office on 9/27/2017  On physical examination pulse 74 beats per minute blood pressure 150/70 mmHg  No pallor  No icterus  No JVD  No carotid bruit  Lungs are  clear with decreased breath sounds  No crackles or wheezes  Cardiac examination reveals regular rate and rhythm with heart sounds consistent with prosthetic valve  Abdomen soft, nontender  No hepatosplenomegaly  ICD noted in the abdominal area  Extremities  with no edema  Overall , Patient has been doing well from cardiac standpoint  He has been on the following medications with the specific indications as described  Patient is on amiodarone prophylaxis for history of paroxysmal ventricular  tachycardia  Patient will have followup labs including thyroid function tests and liver function tests  Blood work has been ordered   On amiodarone  Advair has to be continued given history of COPD  As mentioned, patient regularly followed by  Мария Pathak, his pulmonologist     Alprazolam for anxiety  Patient has done well with this medication   Discontinuation of this medication may put the patient's health in jeopardy as patient has been on this medication for many years  Amoxicillin  as needed for antibiotic prophylaxis for prosthetic mechanical aortic valve   For dental procedures  Pantoprazole for gastroesophageal reflux disease as well as to prevent gastritis , especially since patient is on multiple medications including  Coumadin  Patient is on Coumadin for anticoagulation for prosthetic mechanical aortic valve  Regular PT/INR monitoring  Patient understands the risks and benefits of anticoagulation  Atorvastatin will be continued for hyperlipidemia  Simvastatin  was discontinued secondary to concerns with drug interactions with Coumadin and amiodarone  Patient will need an echocardiogram in November to reassess ejection fraction and assess mechanical prosthetic aortic valve  Atenolol, spironolactone   as well as amlodipine/benazepril to be continued for hypertension  If you have any specific questions regarding this patient, please do not hesitate to contact me in person  Electronically signed by:Precious CASTILLO    Oct 28 2017 11:17AM EST

## 2018-01-17 NOTE — RESULT NOTES
Reason For Visit  ROBER MÉNDEZ is a(n) established patient here today for a 2 mo C.   Patient accompanied by mother  and grandparent .      Quality    Pediatric Wellness CI height documented, discussion of nutritional quality of diet, patient accompanied by mother and Spring was offered to mother.      History of Present Illness  General Health: The child's health since the last visit is described as good.   Caregiver concerns: Caregivers deny concerns regarding nutrition, sleep, behavior, , development and elimination.   Nutrition/Elimination: Current diet includes breast feeding and nursing about 20 minutes each breast every 2 to 3 hours, rarely giving formula.     Dietary supplements: vitamin D.   Maternal Diet: Maternal diet was reviewed and was appropriate for breast feeding.   Elimination: No elimination issues are expressed.   Sleep:. No sleep issues are reported. He sleeps in a crib on his back.   Behavior: No behavior issues identified.   Safety elements used:. safety elements were discussed and are adequate.     He is nursing very well,   stools are soft,   but lately more gassy in the evening, normal mother's diet  He was just seen by urologist and surgery is postponed for later.  Mother is concerned with white of eyes still being slightly yellow, never were white.        Developmental Milestones  Developmental Milestones - 2 month (As reported by parent or witnessed during visit)   Social-Emotional: smiles, looks at parent and self comforts.   Cognitive: indicates boredom when there is no activity change.   Communicative: coos and has different cries for different needs.   Physical Development: can lift head and begin to push up when prone, can hold head erect for short periods (when held upright), diminished  reflexes and there is symmetric movement.        Review of Systems    Const: Normal.   Head: Normal.   Eyes: Per HPI.   ENT: Normal.   Neck: Normal.   CV: Normal.   Resp:  Verified Results  (Q) LYME DISEASE AB, TOTAL W/REFL WB (IGG, IGM) [id number] 02:19PM Precious Acevedo   REPORT COMMENT:  FASTING:NO     Test Name Result Flag Reference   LYME AB SCREEN < OR = 0 90 index     Index                 Interpretation                     -----                 --------------                     < or = 0 90           Negative                     0  91-1 09             Equivocal                     > or = 1 10           Positive     The use of purified VlsE-1 and PepC10 antigens in this  assay provides improved specificity compared to assays  that utilize whole cell lysates of B  burgdorferi, the  causative agent of Lyme disease, and slightly better  sensitivity compared to the C6 antibody assay  As recommended by the Food and Drug   Administration (FDA), all samples with positive or   equivocal results in a Borrelia burgdorferi antibody    EIA (screening) will be tested using a blot method  Positive or equivocal screening test results should not   be interpreted as truly positive until verified as such   using a supplemental assay (e g , B  burgdorferi blot)  The screening test and/or blot for B  burgdorferi   antibodies may be falsely negative in early stages of   Lyme disease, including the period when erythema   migrans is apparent  Normal.   GI: Per HPI.   Neuro: Normal.   Musc: Normal.   Skin: Normal.   Heme/Immun: Normal.   : Per HPI.   Psych: Normal.   Endo: Normal.      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)  Spitting up  (P92.1)    Past Medical History  History of Health examination for  8 to 28 days old (Z00.111)  History of Stewart with fetal cardiac arrhythmia prior to birth (P03.819)    Surgical History  Denied: History Of Prior Surgery  no history of surgery    Family History  Problems   Family history of Allergy : Mother  Family history of Depression, postpartum : Mother  Family history of Elevated antibody levels : Mother  Family history of cardiac disorder (Z82.49)  Family history of hypertension (Z82.49)    Social History  Denied: History of Exposure to second hand smoke in pediatric patient  Lives with parents  Denied: History of Sibling    Review  Past medical history, problem list, family medical history, surgical history and social history reviewed.      Vitals  Vital Signs    Recorded: 2018 03:16PM   Height 1 ft 10.5 in   Weight 12 lb 3 oz   BMI Calculated 16.93   BSA Calculated 0.28   Premature Length Percentile 80 %   Premature Weight Percentile 95 %   Temperature 98.1 F   Head Circumference 38.5 cm   Premature Head Circumference Percentile 60 %     Physical Exam  Constitutional: well developed, well nourished, in no acute distress, interactive and current vital signs reviewed.   Head and Face: normocephalic and atraumatic. anterior fontanelle was open, soft and flat. posterior fontanelle was normal. the skull sutures were normal. no facial abnormalities were observed.   Eyes: no discharge, normal conjunctiva, no eyelid swelling and no ptosis .  slightly icteric sclera. pupils equal and reactive to light, normal red reflex and extraocular movements were intact.   ENT: normal appearing outer ear and normal appearing nose. examination of the tympanic membrane showed normal landmarks and normal appearing external canal. nasal mucosa moist and pink and no nasal discharge. normal lips and normal gums. oral mucosa pink and moist, no oral lesions, palate intact, normal appearing pharynx and normal appearing tongue.   Neck: normal appearing, supple and no torticollis. thyroid not enlarged.   Lymphatic: no lymphadenopathy.   Chest: normal breast appearance. normal chest appearance.   Pulmonary: no respiratory distress, normal respiratory rate and effort and no accessory muscle use. breath sounds clear to auscultation bilaterally.   Cardiovascular: normal rate, no murmurs were heard, regular rhythm, normal S1 and normal S2.   Abdomen: soft, nontender, nondistended, normal bowel sounds, no abdominal mass. no hepatomegaly, no splenomegaly. no umbilical hernia was discovered.   Musculoskeletal: no clubbing or cyanosis of the fingernails. no joint swelling seen and no joint tenderness was elicited. normal ROM of all extremities. no hip instability bilaterally. muscle strength and tone were normal, normal head control and normal grasp.   Genitourinary: the scrotum was normal, the testicles were not swollen, there were no testicular masses and testes were descended bilaterally. the penis was normal and no penile adhesions . the penis had not been circumcised. no inguinal hernia was discovered.   Neurologic: responds to light, strong cry, normal suck, responds to sound and normal facial movements.   Skin: normal skin color and pigmentation, no bruising and no rash . face with mild jaundice. no skin lesions. normal skin turgor.        Immunizations   1 2    Hepatitis B  2018  (1d) 2018  (32d)     Anticipatory Guidance  Anticipatory Guidance 2 month   Selected  topics from each category were either discussed or a handout was given: infant behavior, infant-family synchrony, nutritional adequacy, parental (maternal) well-being and safety.      Discussion/Summary    Health Maintenance Impression: normal growth and normal development.   Additional Impression:. Doing well, 36 week GA but developing well  still concern with icteric sclera, probably related to breast milk, but will confirm with bilirubin test.   Information discussed with mother   CDC VIS was given to parent/legal guardian/designated adult. Risks and benefits of vaccines was discussed and questions answered. Parent/legal guardian/designated adult verbalized understanding.   Child was observed after administration and no side effects noted.   All questions answered. Parent verbalized understanding.    Child discharged to home.            Assessment   1. Well child visit (Z00.129)   2. Encounter for administration of vaccine (Z23)   3. Hyperbilirubinemia (E80.6)    Plan  Orders    · BILIRUBIN PANEL; Status:Active; Requested for:09Nov2018;   Immunizations    · Pentacel Intramuscular Suspension Reconstituted   · Prevnar 13 Intramuscular Suspension   · Rotarix Oral Suspension Reconstituted    Infant Behavior   â€¢ Adequate weight gain.    â€¢ 6-8 wet diapers a day.    â€¢ Stooling - color, frequency.   Infant-Family Synnchrony   â€¢ Maintain consistent family routine.    â€¢ Talk about pictures/story using simple words/sing.    â€¢ Promote language using simple words.    â€¢ No bed sharing.   Nutritional Adequacy   â€¢ Store breastmilk in freezer.    â€¢ Breastfeeding 8-12 feedings in 24hours.    â€¢ Hold to bottle-feed, no bottle propping.    â€¢ Bottle-feeding every 3-4hours.   Parental (maternal well-being)   â€¢ Postpartum checkup.    â€¢ Postpartum depression/family stress.    â€¢ Return to work/school.   Safety:   â€¢ Do not leave alone in bath water.    â€¢ No shaking baby (Shaken Baby Syndrome).    â€¢ Provide  home safety for fire/carbon monoxide poisoning.    â€¢ Provide safe/quality after-school care, if needed.    â€¢ Keep hand on infant when on bed or changing on table/couch.    â€¢ No smoking.    â€¢ Use rear-facing car seat in back seat of car until 24 months .    â€¢ Report domestic violence.    â€¢ Thermometer use.    â€¢ Water heater at <120.    â€¢ Crib safety with slats <2-3/8\".    â€¢ Sleep in crib on back with no loose covers.    â€¢ No bottle in bed.        Signatures   Electronically signed by : Patrick Houston, ; Nov 9 2018  3:20PM CST    Electronically signed by : ANGIE GREENWOOD MD; Nov 9 2018  4:59PM CST

## 2018-01-17 NOTE — RESULT NOTES
Verified Results  (1) PT WITH INR [id number] 11:44AM Precious Acevedo     Test Name Result Flag Reference   INR 3 9 H    Reference Range                     0 9-1 1  Moderate-intensity Warfarin Therapy 2 0-3 0  Higher-intensity Warfarin Therapy   3 0-4 0   PT 39 6 sec H 9 0-11 5   For more information on this test, go to:  [email or web address]

## 2018-01-20 ENCOUNTER — LAB CONVERSION - ENCOUNTER (OUTPATIENT)
Dept: OTHER | Facility: OTHER | Age: 78
End: 2018-01-20

## 2018-01-20 LAB
INR PPP: 4.5
PROTHROMBIN TIME: 45.9 SEC (ref 9–11.5)

## 2018-01-22 ENCOUNTER — GENERIC CONVERSION - ENCOUNTER (OUTPATIENT)
Dept: OTHER | Facility: OTHER | Age: 78
End: 2018-01-22

## 2018-01-23 VITALS
SYSTOLIC BLOOD PRESSURE: 150 MMHG | DIASTOLIC BLOOD PRESSURE: 70 MMHG | HEART RATE: 56 BPM | HEIGHT: 71 IN | WEIGHT: 171 LBS | BODY MASS INDEX: 23.94 KG/M2 | OXYGEN SATURATION: 98 %

## 2018-01-23 NOTE — MISCELLANEOUS
Message  GI Reminder Recall Shailesh Rowe:   Date: 12/04/2017   Dear Gabriela Cohen:     Review of our records shows you are due for the following: Colonoscopy  Our records indicate that you are due at this time to have a follow-up examination for a colonoscopy  As you now, these tests are done to prevent colon cancer, a very common disease in the [place] and responsible for the thousands of patient deaths each year  We at [facility] are concerned for your health, and would very much appreciate you getting in touch with us at your earliest convenience, Again, this examination is vital to your proper health maintenance and for the prevention of cancer  Please call the following office to schedule your appointment:   [facility] [address] [place] [address] [phone]  We look forward to hearing from you!      Sincerely,         Signatures   Electronically signed by : Rosio Horne, ; Dec  4 2017  2:43PM EST                       (Author)

## 2018-01-23 NOTE — MISCELLANEOUS
This is to state that this patient has been followed by me for the past many years  He has history of ventricular tachycardia status post ICD implantation on amiodarone prophylaxis  He also has history of mechanical aortic  valve replacement on anticoagulation  In addition he has history of COPD, gastroesophageal reflux disease as well as hyperlipidemia, hypertension and anxiety  He has been followed on a regular basis and seen every 3 months  Patient has been well maintained  on medical therapy  Patient denies any history of chest pain  Does have some shortness of breath which is stable  Anxiety is well controlled on medications as well as emotional support  No history of palpitations, lightheadedness or dizziness  No history  presyncope, syncope  No history of ICD discharges  Patient was seen in the office today, 10/20/2017  On physical examination pulse 70 beats per minute blood pressure 154/86 mmHg  No pallor  No icterus  No JVD  No carotid bruit  Lungs are clear  with decreased breath sounds  No crackles or wheezes  Cardiac examination reveals regular rate and rhythm with heart sounds consistent with prosthetic valve  Abdomen soft, nontender  No hepatosplenomegaly  ICD noted in the abdominal area  Extremities  with no edema  Patient will be scheduled for an echocardiogram to evaluate ejection fraction as well as prosthetic mechanical aortic valve  Patient will also have blood work including CBC CMP, lipid profile as well as thyroid function tests  Patient has been on amiodarone and we need to follow his liver test as well as thyroid function tests  This has been discussed with the patient  Overall , Patient has been doing well from cardiac standpoint  He has been on the following medications  with the specific indications as described  Advair has to be continued given history of COPD  As mentioned, patient regularly followed by Mani Saleem, his pulmonologist     Alprazolam for anxiety  Patient has done well with this medication  Discontinuation of this medication may put the patient's health in jeopardy as patient has been on this medication for many years  This prescription has been refilled  Amoxicillin as needed for antibiotic prophylaxis for prosthetic mechanical  aortic valve   For dental procedures  Pantoprazole for gastroesophageal reflux disease as well as to prevent gastritis , especially since patient is on multiple medications including Coumadin  Patient is on Coumadin for anticoagulation  for prosthetic mechanical aortic valve  Regular PT/INR monitoring  Patient understands the risks and benefits of anticoagulation  Atorvastatin will be continued for hyperlipidemia  Simvastatin was discontinued secondary to concerns with drug interactions  with Coumadin and amiodarone  Patient will need an echocardiogram in November to reassess ejection fraction and assess mechanical prosthetic aortic valve  Atenolol, spironolactone  as well as amlodipine/benazepril to be continued for hypertension  If you have any specific questions regarding this patient, please do not hesitate to contact me in person  This is to state that this patient has been followed by me for the past many years  He has history of ventricular tachycardia status post  ICD implantation on amiodarone prophylaxis  He also has history of mechanical aortic valve replacement on anticoagulation  In addition he has history of COPD, gastroesophageal reflux disease as well as hyperlipidemia, hypertension and anxiety  He has  been followed on a regular basis and seen every 3 months  Patient has been well maintained on medical therapy  Patient denies any history of chest pain  Does have some shortness of breath which is stable  Anxiety is well controlled on medications as well  as emotional support  No history of palpitations, lightheadedness or dizziness  No history presyncope, syncope  No history of ICD discharges  This is to state that this patient has been followed by me for the past many years  He has history of  ventricular tachycardia status post ICD implantation on amiodarone prophylaxis  He also has history of mechanical aortic valve replacement on anticoagulation  In addition he has history of COPD, gastroesophageal reflux disease as well as hyperlipidemia,  hypertension and anxiety  He has been followed on a regular basis and seen every 3 months  Patient has been well maintained on medical therapy  Patient denies any history of chest pain  Does have some shortness of breath which is stable  Anxiety is well  controlled on medications as well as emotional support  No history of palpitations, lightheadedness or dizziness  No history presyncope, syncope  No history of ICD discharges  Patient was seen in the office on 9/27/2017  On physical examination  pulse 74 beats per minute blood pressure 150/70 mmHg  No pallor  No icterus  No JVD  No carotid bruit  Lungs are clear with decreased breath sounds  No crackles or wheezes  Cardiac examination reveals regular rate and rhythm with heart sounds consistent  with prosthetic valve  Abdomen soft, nontender  No hepatosplenomegaly  ICD noted in the abdominal area  Extremities with no edema  Overall , Patient has been doing well from cardiac standpoint  He has been on the following medications with the  specific indications as described  Patient is on amiodarone prophylaxis for history of paroxysmal ventricular tachycardia  Patient will have followup labs including thyroid function tests and liver function tests  Blood work has been ordered   On  amiodarone  Advair has to be continued given history of COPD  As mentioned, patient regularly followed by Duy Salter, his pulmonologist     Alprazolam for anxiety  Patient has done well with this medication   Discontinuation of this medication  may put the patient's health in jeopardy as patient has been on this medication for many years  Amoxicillin as needed for antibiotic prophylaxis for prosthetic mechanical aortic valve   For dental procedures  Pantoprazole for gastroesophageal  reflux disease as well as to prevent gastritis , especially since patient is on multiple medications including Coumadin  Patient is on Coumadin for anticoagulation for prosthetic mechanical aortic valve  Regular PT/INR monitoring  Patient understands  the risks and benefits of anticoagulation  Atorvastatin will be continued for hyperlipidemia  Simvastatin was discontinued secondary to concerns with drug interactions with Coumadin and amiodarone  Patient will need an echocardiogram in November to reassess  ejection fraction and assess mechanical prosthetic aortic valve  Atenolol, spironolactone  as well as amlodipine/benazepril to be continued for hypertension  If you have any specific questions regarding this patient, please do not hesitate  to contact me in person  Overall , Patient has been doing well from cardiac standpoint  He has been on the following medications with the specific indications as described  Patient is on amiodarone prophylaxis for history of paroxysmal  ventricular tachycardia  Patient will have followup labs including thyroid function tests and liver function tests  Blood work has been ordered   On amiodarone  Advair has to be continued given history of COPD  As mentioned, patient regularly  followed by Kailee Johnson, his pulmonologist     Alprazolam for anxiety  Patient has done well with this medication  Discontinuation of this medication may put the patient's health in jeopardy as patient has been on this medication for many years  Amoxicillin as needed for antibiotic prophylaxis for prosthetic mechanical aortic valve   For dental procedures      Pantoprazole for gastroesophageal reflux disease as well as to prevent gastritis , especially since patient is on multiple medications  including Coumadin  Patient is on Coumadin for anticoagulation for prosthetic mechanical aortic valve  Regular PT/INR monitoring  Patient understands the risks and benefits of anticoagulation  Atorvastatin will be continued for hyperlipidemia  Simvastatin was discontinued secondary to concerns with drug interactions with Coumadin and amiodarone  Patient will need an echocardiogram in November to reassess ejection fraction and assess mechanical prosthetic aortic valve  Atenolol, spironolactone   as well as amlodipine/benazepril to be continued for hypertension  If you have any specific questions regarding this patient, please do not hesitate to contact me in person  Electronically signed by:Precious CASTILLO    Dec 20 2017 10:37PM EST

## 2018-01-24 NOTE — RESULT NOTES
Verified Results  (1) PT WITH INR [id number] 01:27PM Precious Acevedo   REPORT COMMENT:  FASTING:NO     Test Name Result Flag Reference   INR 4 5 H    Reference Range                     0 9-1 1  Moderate-intensity Warfarin Therapy 2 0-3 0  Higher-intensity Warfarin Therapy   3 0-4 0   PT 45 9 sec H 9 0-11 5   For more information on this test, go to:  [email or web address]

## 2018-02-10 LAB
INR PPP: 1.6
INR PPP: 1.6 (ref 0.86–1.16)
PROTHROMBIN TIME: 16.6 SEC (ref 9–11.5)

## 2018-02-11 DIAGNOSIS — I47.2 VENTRICULAR TACHYCARDIA (HCC): Primary | ICD-10-CM

## 2018-02-11 RX ORDER — AMIODARONE HYDROCHLORIDE 200 MG/1
TABLET ORAL
Qty: 90 TABLET | Refills: 3 | Status: SHIPPED | OUTPATIENT
Start: 2018-02-11 | End: 2019-03-15 | Stop reason: SDUPTHER

## 2018-02-16 ENCOUNTER — ANTICOAG VISIT (OUTPATIENT)
Dept: CARDIOLOGY CLINIC | Facility: CLINIC | Age: 78
End: 2018-02-16

## 2018-02-16 DIAGNOSIS — I48.91 ATRIAL FIBRILLATION, UNSPECIFIED TYPE (HCC): Primary | ICD-10-CM

## 2018-02-16 DIAGNOSIS — Z79.01 LONG TERM CURRENT USE OF ANTICOAGULANT: ICD-10-CM

## 2018-02-22 DIAGNOSIS — I10 HYPERTENSION, ESSENTIAL: Primary | ICD-10-CM

## 2018-02-22 RX ORDER — ATENOLOL 25 MG/1
TABLET ORAL
Qty: 90 TABLET | Refills: 2 | Status: SHIPPED | OUTPATIENT
Start: 2018-02-22 | End: 2018-10-19 | Stop reason: SDUPTHER

## 2018-03-02 LAB
INR PPP: 2
PROTHROMBIN TIME: 20.8 SEC (ref 9–11.5)

## 2018-03-05 ENCOUNTER — ANTICOAG VISIT (OUTPATIENT)
Dept: CARDIOLOGY CLINIC | Facility: CLINIC | Age: 78
End: 2018-03-05

## 2018-03-05 DIAGNOSIS — F41.9 ANXIETY: Primary | ICD-10-CM

## 2018-03-05 RX ORDER — ALPRAZOLAM 0.25 MG/1
0.25 TABLET ORAL 2 TIMES DAILY PRN
Qty: 180 TABLET | Refills: 1 | OUTPATIENT
Start: 2018-03-05 | End: 2018-03-09 | Stop reason: SDUPTHER

## 2018-03-09 DIAGNOSIS — F41.9 ANXIETY: ICD-10-CM

## 2018-03-09 RX ORDER — ALPRAZOLAM 0.25 MG/1
0.25 TABLET ORAL 2 TIMES DAILY PRN
Qty: 60 TABLET | Refills: 3 | OUTPATIENT
Start: 2018-03-09 | End: 2018-11-23 | Stop reason: SDUPTHER

## 2018-03-09 NOTE — TELEPHONE ENCOUNTER
PT DAUGHTER WOULD LIKE 60 TABLE[facility]  SHE ALSO WANTS A CALL BACK TO KNOW IF THERE ARE ANY UPDATES ON AUTHORIZATION FOR MEDS FOR WORKMANS COMP  PLEASE SEND MED AND CALL   John Tavarez

## 2018-03-09 NOTE — TELEPHONE ENCOUNTER
Per Dr Betty Schaeffer use the letter in allscripts dated 12/20/17 that he did in the past for workmans comp and pts meds   I lm for pts daughter that I called in the rx to [facility] and that the letter is ready, if she wants it faxed or will  to cb and let me know-MS

## 2018-04-03 ENCOUNTER — ANTICOAG VISIT (OUTPATIENT)
Dept: CARDIOLOGY CLINIC | Facility: CLINIC | Age: 78
End: 2018-04-03

## 2018-04-03 ENCOUNTER — APPOINTMENT (OUTPATIENT)
Dept: HEART AND VASCULAR | Facility: CLINIC | Age: 78
End: 2018-04-03

## 2018-04-03 LAB
INR PPP: 2.6
PROTHROMBIN TIME: 26.1 SEC (ref 9–11.5)

## 2018-04-10 ENCOUNTER — APPOINTMENT (OUTPATIENT)
Dept: HEART AND VASCULAR | Facility: CLINIC | Age: 78
End: 2018-04-10
Payer: OTHER MISCELLANEOUS

## 2018-04-10 VITALS
HEIGHT: 71 IN | SYSTOLIC BLOOD PRESSURE: 108 MMHG | HEART RATE: 50 BPM | WEIGHT: 175 LBS | BODY MASS INDEX: 24.5 KG/M2 | DIASTOLIC BLOOD PRESSURE: 54 MMHG

## 2018-04-10 PROCEDURE — 93282 PRGRMG EVAL IMPLANTABLE DFB: CPT

## 2018-04-13 ENCOUNTER — HOSPITAL ENCOUNTER (OUTPATIENT)
Dept: NON INVASIVE DIAGNOSTICS | Facility: CLINIC | Age: 78
Discharge: HOME | End: 2018-04-13
Payer: MEDICARE

## 2018-04-13 DIAGNOSIS — I35.9 NONRHEUMATIC AORTIC VALVE DISORDER: ICD-10-CM

## 2018-04-13 PROCEDURE — 93306 TTE W/DOPPLER COMPLETE: CPT

## 2018-04-15 PROCEDURE — 93306 TTE W/DOPPLER COMPLETE: CPT | Performed by: INTERNAL MEDICINE

## 2018-04-16 ENCOUNTER — TELEPHONE (OUTPATIENT)
Dept: CARDIOLOGY CLINIC | Facility: CLINIC | Age: 78
End: 2018-04-16

## 2018-04-16 DIAGNOSIS — I71.2 THORACIC AORTIC ANEURYSM, WITHOUT RUPTURE (HCC): Primary | ICD-10-CM

## 2018-04-16 NOTE — TELEPHONE ENCOUNTER
E G  [facility] [address] DR ANITA RODRIGUEZ CALLED REQUESTING COPY OF PT LATEST ECHO  THEY DO NOT HAVE A MR FORM, E FINESSE  SAID SHE WILL TRY AND OBTAIN ONE  I PROVIDED HER WITH DR HA'S FAX#  FAX# FOR [facility] -[phone]   [facility]

## 2018-04-17 ENCOUNTER — TELEPHONE (OUTPATIENT)
Dept: CARDIOLOGY CLINIC | Facility: CLINIC | Age: 78
End: 2018-04-17

## 2018-04-17 NOTE — TELEPHONE ENCOUNTER
----- Message from Herbie Randall MD sent at 4/16/2018 11:22 PM EDT -----  Call echocardiogram overall OK  EF normal  Mechanical AVR working well  Thoracic aorta dilated  He needs CT chest without contrast  Dx  Thoracic aortic aneurysm  Order in the chart   ( last CT chest was in end of 2016 FYI   )

## 2018-04-17 NOTE — TELEPHONE ENCOUNTER
Spoke with pts daughter about the echo results and faxed report to her at [phone], she'd like her number listed as pts home number [phone]

## 2018-04-17 NOTE — TELEPHONE ENCOUNTER
PT HAD AN ECHO YESTERDAY AND HE WANTS TO KNOW IF DR HA CAN CALL HIS DAUGHTER -[phone]   [place] [id number]

## 2018-04-19 ENCOUNTER — TELEPHONE (OUTPATIENT)
Dept: CARDIOLOGY CLINIC | Facility: CLINIC | Age: 78
End: 2018-04-19

## 2018-04-19 NOTE — TELEPHONE ENCOUNTER
Sarah Figueroa called from [address] and stated that pt will be having a CT of the chest tomorrow that Dr  [facility] ordered, it is going through workers comp & needs prior authorization   Sarah Figueroa said pre auth request can be faxed to [phone]

## 2018-04-19 NOTE — TELEPHONE ENCOUNTER
Tried to fax the ct order twice with an error each time, called again and spoke with Fredis Cesar he gave an alternate fax [phone] ALFREDO over now-MS

## 2018-04-19 NOTE — TELEPHONE ENCOUNTER
Spoke with pts daughter to f/u if she was called, she said she was not  I gave her the workmans comp tele and central scheduling so she can cx the ct for tomorrow   She stated her understanding and will call-MS

## 2018-04-19 NOTE — TELEPHONE ENCOUNTER
Called Joanne through [employer] and was provided with [employer] tele [phone] (claim # [id number])  Called this number and was given another number to call, the name is [facility] [employer] [phone]  Was transferred to this number and transferred another 2 times  Spoke with Ayanna Armstrong and provided him with the CT info and faxed the order to him as requested to [phone] and attached pts Id# [id number]  He said where the pt is going which is  [facility] they are not in network  He said once the CT order is received they'll contact pt to reschedule the CT at an in network facility, I told him pt is scheduled for tomorrow but he'll call to change that  I called and lm for pts daughter to let her know

## 2018-04-28 LAB
INR PPP: 2.9
PROTHROMBIN TIME: 29.5 SEC (ref 9–11.5)

## 2018-04-30 ENCOUNTER — ANTICOAG VISIT (OUTPATIENT)
Dept: CARDIOLOGY CLINIC | Facility: CLINIC | Age: 78
End: 2018-04-30

## 2018-05-01 ENCOUNTER — TELEPHONE (OUTPATIENT)
Dept: CARDIOLOGY CLINIC | Facility: CLINIC | Age: 78
End: 2018-05-01

## 2018-05-01 DIAGNOSIS — I10 HYPERTENSION, ESSENTIAL: ICD-10-CM

## 2018-05-01 DIAGNOSIS — K21.9 GASTROESOPHAGEAL REFLUX DISEASE WITHOUT ESOPHAGITIS: Primary | ICD-10-CM

## 2018-05-01 RX ORDER — PANTOPRAZOLE SODIUM 40 MG/1
TABLET, DELAYED RELEASE ORAL
Qty: 90 TABLET | Refills: 2 | Status: SHIPPED | OUTPATIENT
Start: 2018-05-01 | End: 2018-11-23 | Stop reason: SDUPTHER

## 2018-05-01 RX ORDER — SPIRONOLACTONE 25 MG/1
TABLET ORAL
Qty: 90 TABLET | Refills: 3 | Status: SHIPPED | OUTPATIENT
Start: 2018-05-01 | End: 2019-03-12 | Stop reason: SDUPTHER

## 2018-05-01 NOTE — TELEPHONE ENCOUNTER
Spoke with the radiologist, he said he'll add an addendum with that information and fax that over to our 570 fax-MS

## 2018-05-01 NOTE — TELEPHONE ENCOUNTER
----- Message from Penny Galeas MD sent at 4/27/2018  8:13 PM EDT -----  Regarding: Thoracic aorta size  This patient had a CT of the chest at [address] phone number [phone]  please call them  In the report, there is no mention of the size of the thoracic aorta  They need to make an addendum regarding the size of the aorta and let us know what the size is   Thanks

## 2018-05-04 ENCOUNTER — TELEPHONE (OUTPATIENT)
Dept: CARDIOLOGY CLINIC | Facility: CLINIC | Age: 78
End: 2018-05-04

## 2018-05-04 NOTE — TELEPHONE ENCOUNTER
LM on Daughters voicemail stating below          ----- Message from Yarelis Sanchez MD sent at 5/3/2018  9:10 AM EDT -----  Regarding: CT chest  CT chest showed mildly dilated aorta 4 2 cm  No major change from priors

## 2018-05-07 DIAGNOSIS — I35.9 AORTIC VALVE DISORDER: Primary | ICD-10-CM

## 2018-05-07 RX ORDER — WARFARIN SODIUM 5 MG/1
TABLET ORAL
COMMUNITY
Start: 2013-10-23 | End: 2018-05-07 | Stop reason: SDUPTHER

## 2018-05-07 RX ORDER — WARFARIN SODIUM 5 MG/1
TABLET ORAL
Qty: 90 TABLET | Refills: 3 | Status: SHIPPED | OUTPATIENT
Start: 2018-05-07 | End: 2018-11-06 | Stop reason: SDUPTHER

## 2018-05-26 LAB
INR PPP: 1.9
PROTHROMBIN TIME: 19.5 SEC (ref 9–11.5)

## 2018-05-29 ENCOUNTER — ANTICOAG VISIT (OUTPATIENT)
Dept: CARDIOLOGY CLINIC | Facility: CLINIC | Age: 78
End: 2018-05-29

## 2018-07-06 ENCOUNTER — TELEPHONE (OUTPATIENT)
Dept: CARDIOLOGY CLINIC | Facility: CLINIC | Age: 78
End: 2018-07-06

## 2018-07-06 DIAGNOSIS — I35.9 AORTIC VALVE DISORDER: Primary | ICD-10-CM

## 2018-07-07 LAB
INR PPP: 2.9
PROTHROMBIN TIME: 29.5 SEC (ref 9–11.5)

## 2018-07-09 ENCOUNTER — ANTICOAG VISIT (OUTPATIENT)
Dept: CARDIOLOGY CLINIC | Facility: CLINIC | Age: 78
End: 2018-07-09

## 2018-07-09 ENCOUNTER — TELEPHONE (OUTPATIENT)
Dept: CARDIOLOGY CLINIC | Facility: CLINIC | Age: 78
End: 2018-07-09

## 2018-08-25 LAB
INR PPP: 2.1
PROTHROMBIN TIME: 21.4 SEC (ref 9–11.5)

## 2018-08-27 ENCOUNTER — ANTICOAG VISIT (OUTPATIENT)
Dept: CARDIOLOGY CLINIC | Facility: CLINIC | Age: 78
End: 2018-08-27

## 2018-09-05 DIAGNOSIS — I10 HTN (HYPERTENSION): Primary | ICD-10-CM

## 2018-09-05 RX ORDER — AMLODIPINE BESYLATE AND BENAZEPRIL HYDROCHLORIDE 5; 10 MG/1; MG/1
CAPSULE ORAL
Qty: 90 CAPSULE | Refills: 3 | Status: SHIPPED | OUTPATIENT
Start: 2018-09-05 | End: 2018-11-23 | Stop reason: ALTCHOICE

## 2018-09-13 ENCOUNTER — APPOINTMENT (OUTPATIENT)
Dept: HEART AND VASCULAR | Facility: CLINIC | Age: 78
End: 2018-09-13
Payer: OTHER MISCELLANEOUS

## 2018-09-13 VITALS
HEART RATE: 75 BPM | SYSTOLIC BLOOD PRESSURE: 110 MMHG | WEIGHT: 183 LBS | DIASTOLIC BLOOD PRESSURE: 62 MMHG | HEIGHT: 69.5 IN | TEMPERATURE: 97.9 F | OXYGEN SATURATION: 98 % | BODY MASS INDEX: 26.5 KG/M2

## 2018-09-13 DIAGNOSIS — K21.9 GASTRO-ESOPHAGEAL REFLUX DISEASE W/OUT ESOPHAGITIS: ICD-10-CM

## 2018-09-13 DIAGNOSIS — Z86.69 PERSONAL HISTORY OF OTHER DISEASES OF THE NERVOUS SYSTEM AND SENSE ORGANS: ICD-10-CM

## 2018-09-13 DIAGNOSIS — Z78.9 OTHER SPECIFIED HEALTH STATUS: ICD-10-CM

## 2018-09-13 DIAGNOSIS — F41.9 ANXIETY DISORDER, UNSPECIFIED: ICD-10-CM

## 2018-09-13 PROCEDURE — 93000 ELECTROCARDIOGRAM COMPLETE: CPT

## 2018-09-13 PROCEDURE — 99204 OFFICE O/P NEW MOD 45 MIN: CPT

## 2018-09-13 RX ORDER — TIMOLOL MALEATE 2.5 MG/ML
0.25 SOLUTION/ DROPS OPHTHALMIC
Refills: 0 | Status: ACTIVE | COMMUNITY

## 2018-09-13 RX ORDER — TIOTROPIUM BROMIDE AND OLODATEROL 3.124; 2.736 UG/1; UG/1
2.5-2.5 SPRAY, METERED RESPIRATORY (INHALATION) DAILY
Refills: 0 | Status: ACTIVE | COMMUNITY

## 2018-09-20 ENCOUNTER — TRANSCRIBE ORDERS (OUTPATIENT)
Dept: ADMINISTRATIVE | Facility: HOSPITAL | Age: 78
End: 2018-09-20

## 2018-09-20 DIAGNOSIS — I73.9 PERIPHERAL VASCULAR DISEASE, UNSPECIFIED (HCC): Primary | ICD-10-CM

## 2018-10-02 ENCOUNTER — ANTICOAG VISIT (OUTPATIENT)
Dept: CARDIOLOGY CLINIC | Facility: CLINIC | Age: 78
End: 2018-10-02

## 2018-10-02 LAB
INR PPP: 2.8
PROTHROMBIN TIME: 28.1 SEC (ref 9–11.5)

## 2018-10-19 DIAGNOSIS — I10 HYPERTENSION, ESSENTIAL: ICD-10-CM

## 2018-10-22 RX ORDER — ATENOLOL 25 MG/1
TABLET ORAL
Qty: 90 TABLET | Refills: 2 | Status: SHIPPED | OUTPATIENT
Start: 2018-10-22 | End: 2019-06-18 | Stop reason: SDUPTHER

## 2018-11-01 ENCOUNTER — HOSPITAL ENCOUNTER (OUTPATIENT)
Dept: NON INVASIVE DIAGNOSTICS | Facility: CLINIC | Age: 78
Discharge: HOME | End: 2018-11-01
Payer: MEDICARE

## 2018-11-01 DIAGNOSIS — I73.9 PERIPHERAL VASCULAR DISEASE, UNSPECIFIED (HCC): ICD-10-CM

## 2018-11-01 PROCEDURE — 93923 UPR/LXTR ART STDY 3+ LVLS: CPT

## 2018-11-02 PROCEDURE — 93923 UPR/LXTR ART STDY 3+ LVLS: CPT | Performed by: SURGERY

## 2018-11-06 DIAGNOSIS — I35.9 AORTIC VALVE DISORDER: ICD-10-CM

## 2018-11-06 RX ORDER — WARFARIN SODIUM 5 MG/1
TABLET ORAL
Qty: 90 TABLET | Refills: 3 | Status: SHIPPED | OUTPATIENT
Start: 2018-11-06 | End: 2018-11-23 | Stop reason: SDUPTHER

## 2018-11-16 ENCOUNTER — TELEPHONE (OUTPATIENT)
Dept: CARDIOLOGY CLINIC | Facility: CLINIC | Age: 78
End: 2018-11-16

## 2018-11-16 ENCOUNTER — ANTICOAG VISIT (OUTPATIENT)
Dept: CARDIOLOGY CLINIC | Facility: CLINIC | Age: 78
End: 2018-11-16

## 2018-11-16 LAB
INR PPP: 2.3
PROTHROMBIN TIME: 23.6 SEC (ref 9–11.5)

## 2018-11-23 ENCOUNTER — OFFICE VISIT (OUTPATIENT)
Dept: CARDIOLOGY CLINIC | Facility: CLINIC | Age: 78
End: 2018-11-23
Payer: OTHER MISCELLANEOUS

## 2018-11-23 ENCOUNTER — TELEPHONE (OUTPATIENT)
Dept: CARDIOLOGY CLINIC | Facility: CLINIC | Age: 78
End: 2018-11-23

## 2018-11-23 VITALS
HEART RATE: 70 BPM | SYSTOLIC BLOOD PRESSURE: 136 MMHG | OXYGEN SATURATION: 97 % | HEIGHT: 71 IN | DIASTOLIC BLOOD PRESSURE: 68 MMHG | RESPIRATION RATE: 18 BRPM | BODY MASS INDEX: 26.32 KG/M2 | WEIGHT: 188 LBS

## 2018-11-23 DIAGNOSIS — F41.9 ANXIETY: ICD-10-CM

## 2018-11-23 DIAGNOSIS — I47.2 VENTRICULAR TACHYCARDIA (HCC): ICD-10-CM

## 2018-11-23 DIAGNOSIS — I10 HYPERTENSION, ESSENTIAL: Primary | ICD-10-CM

## 2018-11-23 DIAGNOSIS — I71.2 THORACIC AORTIC ANEURYSM, WITHOUT RUPTURE (HCC): ICD-10-CM

## 2018-11-23 DIAGNOSIS — R06.02 SHORTNESS OF BREATH: ICD-10-CM

## 2018-11-23 DIAGNOSIS — I35.9 AORTIC VALVE DISORDER: ICD-10-CM

## 2018-11-23 DIAGNOSIS — K21.9 GASTROESOPHAGEAL REFLUX DISEASE WITHOUT ESOPHAGITIS: ICD-10-CM

## 2018-11-23 DIAGNOSIS — E78.2 COMBINED HYPERLIPIDEMIA: ICD-10-CM

## 2018-11-23 PROBLEM — I71.20 THORACIC AORTIC ANEURYSM, WITHOUT RUPTURE: Status: ACTIVE | Noted: 2018-11-23

## 2018-11-23 PROBLEM — I47.20 VENTRICULAR TACHYCARDIA: Status: ACTIVE | Noted: 2018-11-23

## 2018-11-23 PROCEDURE — 99214 OFFICE O/P EST MOD 30 MIN: CPT | Performed by: INTERNAL MEDICINE

## 2018-11-23 RX ORDER — PANTOPRAZOLE SODIUM 40 MG/1
40 TABLET, DELAYED RELEASE ORAL DAILY
Qty: 90 TABLET | Refills: 3 | Status: SHIPPED | OUTPATIENT
Start: 2018-11-23

## 2018-11-23 RX ORDER — ALPRAZOLAM 0.25 MG/1
0.25 TABLET ORAL 2 TIMES DAILY PRN
Qty: 180 TABLET | Refills: 0 | Status: SHIPPED | OUTPATIENT
Start: 2018-11-23 | End: 2019-04-30 | Stop reason: SDUPTHER

## 2018-11-23 RX ORDER — ATORVASTATIN CALCIUM 20 MG/1
20 TABLET, FILM COATED ORAL DAILY
Qty: 90 TABLET | Refills: 3 | Status: SHIPPED | OUTPATIENT
Start: 2018-11-23 | End: 2019-03-18 | Stop reason: SDUPTHER

## 2018-11-23 RX ORDER — ATORVASTATIN CALCIUM 20 MG/1
20 TABLET, FILM COATED ORAL DAILY
COMMUNITY
Start: 2018-10-16 | End: 2018-11-23 | Stop reason: SDUPTHER

## 2018-11-23 RX ORDER — WARFARIN SODIUM 5 MG/1
TABLET ORAL
Qty: 90 TABLET | Refills: 3 | Status: SHIPPED | OUTPATIENT
Start: 2018-11-23 | End: 2019-11-28 | Stop reason: SDUPTHER

## 2018-11-23 RX ORDER — ALPRAZOLAM 0.25 MG/1
0.25 TABLET ORAL 2 TIMES DAILY PRN
Qty: 180 TABLET | Refills: 0 | OUTPATIENT
Start: 2018-11-23 | End: 2018-11-23 | Stop reason: SDUPTHER

## 2018-11-23 NOTE — PROGRESS NOTES
[facility] [place] [facility]  [address]  [facility]  [id number]  Cardiology Follow Up    Navarro Vega  1940  [id number]      1  Hypertension, essential     2  Aortic valve disorder  warfarin (COUMADIN) 5 mg tablet   3  Thoracic aortic aneurysm, without rupture ([facility] [address] )     4  Ventricular tachycardia [facility])         Chief Complaint   Patient presents with    Follow-up       Interval History:  Patient presents for follow-up visit  Patient has multiple medical problems including history of ventricular tachycardia as well as history of ICD implantation  Patient also has history of prosthetic mechanical aortic valve replacement  Patient does have history of thoracic aortic aneurysm measuring 4 5 cm  Patient was supposed to have a CT of the chest earlier this year  I do not see a report  The daughter states that he did have it done  She is going to check and provided as the copy of the report if  Patient has shortness of breath with exertion  No palpitations  Patient also has a cardiologist in [place] for his ICD  Patient had symptoms of claudication and had the if lower extremity arterial studies which showed noncompressibility  No bleeding issues  Patient does have history of anxiety and takes alprazolam     Patient Active Problem List   Diagnosis    Aortic valve disorder    Hypertension, essential    Thoracic aortic aneurysm, without rupture ([facility] [address] )    Ventricular tachycardia ([facility] [address] )     No past medical history on file  Social History     Social History    Marital status: /Civil Union     Spouse name: N/A    Number of children: N/A    Years of education: N/A     Occupational History    Not on file       Social History Main Topics    Smoking status: Not on file    Smokeless tobacco: Not on file    Alcohol use Not on file    Drug use: Unknown    Sexual activity: Not on file     Other Topics Concern    Not on file     Social History Narrative    No narrative on file No family history on file  No past surgical history on file  Current Outpatient Prescriptions:     ALPRAZolam (XANAX) 0 25 mg tablet, Take 1 tablet (0 25 mg total) by mouth 2 (two) times a day as needed for anxiety, Disp: 60 tablet, Rfl: 3    amiodarone 200 mg tablet, TAKE ONE-HALF (1/2) TABLET DAILY, Disp: 90 tablet, Rfl: 3    atenolol (TENORMIN) 25 mg tablet, TAKE 1 TABLET DAILY, Disp: 90 tablet, Rfl: 2    pantoprazole (PROTONIX) 40 mg tablet, TAKE 1 TABLET DAILY, Disp: 90 tablet, Rfl: 2    spironolactone (ALDACTONE) 25 mg tablet, TAKE 1 TABLET DAILY, Disp: 90 tablet, Rfl: 3    tiotropium-olodaterol (STIOLTO RESPIMAT) 2 5-2 5 MCG/ACT inhaler, Inhale 2 puffs daily, Disp: , Rfl:     warfarin (COUMADIN) 5 mg tablet, Take 1 tablet daily or as directed, Disp: 90 tablet, Rfl: 3    amLODIPine-benazepril (LOTREL 5-10) 5-10 MG per capsule, TAKE 1 CAPSULE DAILY (Patient not taking: Reported on 11/23/2018), Disp: 90 capsule, Rfl: 3    atorvastatin (LIPITOR) 20 mg tablet, Take 20 mg by mouth daily, Disp: , Rfl:   Allergies   Allergen Reactions    Metoprolol        Labs:  Orders Only on 11/15/2018   Component Date Value    INR 11/15/2018 2 3*    Prothrombin Time 11/15/2018 23 6*   Orders Only on 10/01/2018   Component Date Value    INR 10/01/2018 2 8*    Prothrombin Time 10/01/2018 28 1*     Imaging: Inis Stakes & Waveform Analysis, Multiple Levels    Result Date: 11/2/2018  Narrative:  [facility] REPORT CLINICAL: Indications:  PVD, Unspecified [I73 9]  Patient c/o right calf pain after walking unknown distances  Recently returned from a vacation in [facility] where he did a lot of walking  Pain subsided with rest  Denies rest pain  Risk Factors The patient has history of HTN  Clinical Right Pressure:  126/ mm Hg, Left Pressure:  128/ mm Hg  FINDINGS:  Segment       Rig  Left                          P    P  Ant   Tibial   131  240  Post  Tibial  166  240  Ankle         166  240  Metatarsal     90  149 Great Toe      53   49     CONCLUSION: Impression RIGHT LOWER LIMB Ankle/Brachial Index: Unreliable due to poor/non-compressible PPG/PVR Tracings are slightly dampened  Monophasic waveforms at ankle  Metatarsal Pressure 90 mmHg Great Toe Pressure: 53 mmHg, within limits for healing potential LEFT LOWER LIMB Ankle/Brachial Index: Unreliable due to  Non-compressible PPG/PVR Tracings are slightly dampened  Monophasic waveforms at the ankle  Metatarsal 149  Pressure mmHg Great Toe Pressure: 49 mmHg, within limits for healing potential   SIGNATURE: Electronically Signed by: Yenny Wilcox MD, RPVI on 2018-11-02 09:52:55 PM      Review of Systems:  Review of Systems   REVIEW OF SYSTEMS:  Constitutional:  Denies fever or chills   Eyes:  Denies change in visual acuity   HENT:  Denies nasal congestion or sore throat   Respiratory:   shortness of breath   Cardiovascular:  Denies chest pain or edema   GI:  Denies abdominal pain, nausea, vomiting, bloody stools or diarrhea   :  Denies dysuria, frequency, difficulty in micturition and nocturia  Musculoskeletal:  Denies back pain or joint pain   Neurologic:  Denies headache, focal weakness or sensory changes   Endocrine:  Denies polyuria or polydipsia   Lymphatic:  Denies swollen glands   Psychiatric:  anxiety     Physical Exam:    /68   Pulse 70   Resp 18   Ht 5' 11" (1 803 m)   Wt 85 3 kg (188 lb)   SpO2 97%   BMI 26 22 kg/m²     Physical Exam   PHYSICAL EXAM:  General:  Patient is not in acute distress   Head: Normocephalic, Atraumatic  HEENT:  Both pupils normal-size atraumatic, normocephalic, nonicteric  Neck:  JVP not raised  Trachea central  No carotid bruit  Respiratory:  normal breath sounds no crackles  no rhonchi  Cardiovascular:  Regular rate and rhythm no S3 no murmurs  Heart sounds consistent with prosthetic mechanical valve  GI:  Abdomen soft nontender  No organomegaly     Lymphatic:  No cervical or inguinal lymphadenopathy  Neurologic:  Patient is awake alert, oriented   Grossly nonfocal    Discussion/Summary:  Patient with multiple medical problems who seems to be doing reasonably well from cardiac standpoint  Previous studies reviewed with patient  Medications reviewed and possible side effects discussed  concepts of cardiovascular disease , signs and symptoms of heart disease  Dietary and risk factor modification reinforced  All questions answered  Safety measures reviewed  Patient advised to report any problems prompting medical attention  Patient does have symptoms of shortness of breath with exertion  Patient is unable to exercise on the treadmill because of back issues  Patient will be scheduled for a pharmacological nuclear stress test to assess for ischemia  Patient's last stress test was more than 2 years ago  Symptoms to watch out from cardiac standpoint which would indicate the need for further cardiac evaluation discussed  Patient's ICD is followed in [place] by Cardiology  Patient report any ICD discharges  Medications reviewed and refilled  Habituation potential for antianxiety medications reviewed  The daughter will forward the results of blood work he had done this year  as well as results of the CT of the chest   Follow-up in 3 to 4 months or earlier as needed  Patient and family had a few questions which were answered

## 2018-11-23 NOTE — TELEPHONE ENCOUNTER
WHEN I MADE PT'S APPT ON 11/01/18 PT TOLD ME THAT THIS APPT WAS GOING TO BE UNDER MEDICARE  WHEN PT CAME FOR HIS APPT PT'S DTR TOLD ME THAT PT'S APPT FOR TODAY IS THROUGH W/C & SHOULD NEVER BE BILLED UNDER PT'S INSUR  I TOLD PT'S DTR THAT I HAD TO CALL W[facility] TO MAKE SURE IT WAS STILL ACTIVE  PT'S WIFE HAD ALSO CAME FOR AN APPT & AT THIS POINT I HAD ONLY PT'S WIFE CHECKED IN & NOT PT & PT'S WIFE GOT CALLED BACK TO THE EXAM ROOM IN WHICH PT FOLLOWED HIS WIFE TO THE EXAM ROOM  I CALLED W/C -[phone] W/C #[id number] & SPOKE TO REINA WHITE & HE SAID THAT "YES" PT'S W/C CLAIM IS OPEN FOR THE HEART & I THEN ASKED REINA WALTER IF AN AUTH WILL BE NEEDED FOR PT'S VISIT FOR TODAY & HE SAID THAT HE DOESN'T KNOW & TO ASK THE AUTH DEPT BUT THEY ARE CLOSED TODAY DUE TO THE HOLIDAY & ISNT SURE IF THE AUTH CAN BE RETRO   I CALLED THE AUTH DEPT & SPOKE TO [facility] & SHE SAID THAT "YES" AN AUTH IS REQ B/C PT'S W/C CLAIM IS FOR [place] & PT IS BEING SEEN IN [place] & THAT WE HAVE TO FAX OVER A SCRIPT SIGNED BY DR HA THAT PT WAS BEING SEEN IN HIS OFFICE TODAY FOR AN APPT W/ DX & FAXED TO THE  JERICHO HOOPER [phone] & ALSO INCLUDE THE CLAIM #[id number] ON THE COVER SHEET

## 2018-11-26 ENCOUNTER — TELEPHONE (OUTPATIENT)
Dept: CARDIOLOGY CLINIC | Facility: CLINIC | Age: 78
End: 2018-11-26

## 2018-11-26 NOTE — TELEPHONE ENCOUNTER
Matthew Adrian MD  P [facility]             Please fax the letter dated 11/23/2018   Lujean Schlatter details of who is should be faxed should be in the chart/task from last week        Faxed with confirmation 17-Apr-2018 18:49

## 2018-11-30 ENCOUNTER — APPOINTMENT (OUTPATIENT)
Dept: HEART AND VASCULAR | Facility: CLINIC | Age: 78
End: 2018-11-30

## 2018-12-26 DIAGNOSIS — K21.9 GASTROESOPHAGEAL REFLUX DISEASE WITHOUT ESOPHAGITIS: ICD-10-CM

## 2018-12-28 RX ORDER — PANTOPRAZOLE SODIUM 40 MG/1
TABLET, DELAYED RELEASE ORAL
Qty: 90 TABLET | Refills: 2 | Status: SHIPPED | OUTPATIENT
Start: 2018-12-28

## 2019-01-17 ENCOUNTER — APPOINTMENT (OUTPATIENT)
Dept: HEART AND VASCULAR | Facility: CLINIC | Age: 79
End: 2019-01-17
Payer: OTHER MISCELLANEOUS

## 2019-01-17 VITALS
SYSTOLIC BLOOD PRESSURE: 130 MMHG | OXYGEN SATURATION: 97 % | WEIGHT: 179.99 LBS | HEART RATE: 65 BPM | HEIGHT: 69.5 IN | DIASTOLIC BLOOD PRESSURE: 70 MMHG | BODY MASS INDEX: 26.06 KG/M2 | TEMPERATURE: 97.9 F

## 2019-01-17 DIAGNOSIS — G89.29 DORSALGIA, UNSPECIFIED: ICD-10-CM

## 2019-01-17 DIAGNOSIS — M54.9 DORSALGIA, UNSPECIFIED: ICD-10-CM

## 2019-01-17 PROCEDURE — 99214 OFFICE O/P EST MOD 30 MIN: CPT

## 2019-01-17 NOTE — PHYSICAL EXAM
[General Appearance - Well Developed] : well developed [Normal Appearance] : normal appearance [Well Groomed] : well groomed [General Appearance - Well Nourished] : well nourished [No Deformities] : no deformities [General Appearance - In No Acute Distress] : no acute distress [Normal Conjunctiva] : the conjunctiva exhibited no abnormalities [Eyelids - No Xanthelasma] : the eyelids demonstrated no xanthelasmas [Normal Oral Mucosa] : normal oral mucosa [No Oral Pallor] : no oral pallor [No Oral Cyanosis] : no oral cyanosis [Respiration, Rhythm And Depth] : normal respiratory rhythm and effort [Exaggerated Use Of Accessory Muscles For Inspiration] : no accessory muscle use [Auscultation Breath Sounds / Voice Sounds] : lungs were clear to auscultation bilaterally [Heart Rate And Rhythm] : heart rate and rhythm were normal [Abdomen Soft] : soft [Abdomen Tenderness] : non-tender [Abdomen Mass (___ Cm)] : no abdominal mass palpated [Abnormal Walk] : normal gait [Gait - Sufficient For Exercise Testing] : the gait was sufficient for exercise testing [Skin Color & Pigmentation] : normal skin color and pigmentation [] : no rash [No Venous Stasis] : no venous stasis [Skin Lesions] : no skin lesions [No Skin Ulcers] : no skin ulcer [No Xanthoma] : no  xanthoma was observed [Oriented To Time, Place, And Person] : oriented to person, place, and time [Affect] : the affect was normal [Mood] : the mood was normal [No Anxiety] : not feeling anxious [FreeTextEntry1] :  Unable to palp all 4 distal pulses

## 2019-01-17 NOTE — ASSESSMENT
[FreeTextEntry1] : PAF- Maintaining NSR on Amio, pulmonologist in [place] follows for Amio Pulm toxicity\par \par HFpEF- stable\par \par Back Pain- Percocet renewed [id number] \par \par VT- no shocks in 3 yrs\par \par AVR- Mechanical, St Javan's Valve, maintained on Coumadin, INR followed by Cardio in [place]\par \par HTN- stable\par \par PVD- reports claudication of RLE x 4 mos, unable to palpate distal pulses\par \par Prediabetes- A1c 5.7

## 2019-01-17 NOTE — REASON FOR VISIT
[FreeTextEntry1] : In 1993 AVR,St Javan Valve for AS with Dr Monroe, 1995 V Tach and A Fib (had a cardiac arrest). AICD placed.  Amio started in 1998 for AICD shocks.  Followed by Dr Yoo in [facility].\par Cardiac arrest #2 in 2003 or 2004 saved by AICD.  2015 device change. There is HFpEF but it  is compensated when in NSR.  No shocks in 3 yrs. EF is normal @ 55 by echo, 48% on Nuc\par \par EKG: NSR with 1st degree AVB. PRWP,  ST-Tw abnormalities. 9/13/18

## 2019-01-17 NOTE — HISTORY OF PRESENT ILLNESS
[FreeTextEntry1] : Cardio- Dr Yoo/April\par Pulm- Dr Pink\par Cardio- Dr Ames(retired)\par EP- Dr Brown\par Billing- Workers Comp: Mariza Huffman T [phone], F [phone]\par Account Claims Claim # [id number]

## 2019-01-22 ENCOUNTER — TELEPHONE (OUTPATIENT)
Dept: CARDIOLOGY CLINIC | Facility: CLINIC | Age: 79
End: 2019-01-22

## 2019-01-22 ENCOUNTER — ANTICOAG VISIT (OUTPATIENT)
Dept: CARDIOLOGY CLINIC | Facility: CLINIC | Age: 79
End: 2019-01-22

## 2019-01-22 LAB — INR PPP: 1.8 (ref 0.86–1.17)

## 2019-01-22 NOTE — PROGRESS NOTES
S/w daughter  She gave him 7 5mg today, he will go back on his reg dose and retest in one week  Daughter states he was on 5/2 5/2 5 not 5/5/2 5   Erika haines

## 2019-01-29 ENCOUNTER — ANTICOAG VISIT (OUTPATIENT)
Dept: CARDIOLOGY CLINIC | Facility: CLINIC | Age: 79
End: 2019-01-29

## 2019-01-29 LAB — INR PPP: 2 (ref 0.86–1.17)

## 2019-01-29 NOTE — PROGRESS NOTES
Jaiden on daughter's phone telling her to have him go on the 5/5/2 5 since last week he was doing 5/2 5/2 5 and low

## 2019-02-05 ENCOUNTER — ANTICOAG VISIT (OUTPATIENT)
Dept: CARDIOLOGY CLINIC | Facility: CLINIC | Age: 79
End: 2019-02-05

## 2019-02-05 LAB — INR PPP: 2.5 (ref 0.86–1.17)

## 2019-02-05 NOTE — PROGRESS NOTES
I called daughter's cell and lm telling her to have him stay on the same dose and retest again in 2 weeks

## 2019-02-18 ENCOUNTER — ANTICOAG VISIT (OUTPATIENT)
Dept: CARDIOLOGY CLINIC | Facility: CLINIC | Age: 79
End: 2019-02-18

## 2019-02-18 LAB — INR PPP: 1.94 (ref 0.86–1.17)

## 2019-02-20 NOTE — PROGRESS NOTES
S/w Dr Onur Erazo, She believes that her fathers reading is wrong since he was at 2 5 last time  She said last time she had him on the 5/5/2 5 schedule and after the results of 2 5 she said she put her father up  to 5mg daily so she feels this current result is wrong  She thinks it was sitting to long since she took him 2/17, results came back 2/18 and thinks the blood may have clotted in the tube  She is going to retest him again

## 2019-03-12 DIAGNOSIS — I10 HYPERTENSION, ESSENTIAL: ICD-10-CM

## 2019-03-14 RX ORDER — SPIRONOLACTONE 25 MG/1
TABLET ORAL
Qty: 90 TABLET | Refills: 3 | Status: SHIPPED | OUTPATIENT
Start: 2019-03-14

## 2019-03-15 DIAGNOSIS — I47.2 VENTRICULAR TACHYCARDIA (HCC): ICD-10-CM

## 2019-03-15 RX ORDER — AMIODARONE HYDROCHLORIDE 200 MG/1
TABLET ORAL
Qty: 90 TABLET | Refills: 3 | Status: SHIPPED | OUTPATIENT
Start: 2019-03-15 | End: 2020-05-06

## 2019-03-18 DIAGNOSIS — E78.2 COMBINED HYPERLIPIDEMIA: ICD-10-CM

## 2019-03-18 RX ORDER — ATORVASTATIN CALCIUM 20 MG
TABLET ORAL
Qty: 90 TABLET | Refills: 2 | Status: SHIPPED | OUTPATIENT
Start: 2019-03-18 | End: 2020-05-06

## 2019-03-29 ENCOUNTER — APPOINTMENT (OUTPATIENT)
Dept: HEART AND VASCULAR | Facility: CLINIC | Age: 79
End: 2019-03-29
Payer: OTHER MISCELLANEOUS

## 2019-03-29 VITALS
SYSTOLIC BLOOD PRESSURE: 137 MMHG | HEIGHT: 69 IN | BODY MASS INDEX: 26.66 KG/M2 | HEART RATE: 70 BPM | WEIGHT: 180 LBS | DIASTOLIC BLOOD PRESSURE: 62 MMHG

## 2019-03-29 PROCEDURE — 93282 PRGRMG EVAL IMPLANTABLE DFB: CPT

## 2019-04-25 ENCOUNTER — TELEPHONE (OUTPATIENT)
Dept: CARDIOLOGY CLINIC | Facility: CLINIC | Age: 79
End: 2019-04-25

## 2019-04-30 DIAGNOSIS — F41.9 ANXIETY: ICD-10-CM

## 2019-04-30 RX ORDER — ALPRAZOLAM 0.25 MG/1
0.25 TABLET ORAL 2 TIMES DAILY PRN
Qty: 180 TABLET | Refills: 2 | Status: SHIPPED | OUTPATIENT
Start: 2019-04-30 | End: 2019-06-28 | Stop reason: SDUPTHER

## 2019-05-01 NOTE — HISTORY OF PRESENT ILLNESS
[None] : The patient complains of no symptoms [de-identified] : This is a 79 yo man with an abdominal ICD system in place for secondary prevention of VT, original implant 1999, with generator change 2006.  His ICD reached the ISIAH and he underwent generator change 9/10/15.  He presents for routine follow-up today and offers no complaints.

## 2019-05-01 NOTE — PROCEDURE
[No] : not [VVI] : VVI [Medtronic] : Medtronic [ICD] : Implantable cardioverter-defibrillator [Ventricular] : Ventricular [Normal] : The battery status is normal. [Charge Time: ___ sec] : charge time was [unfilled] seconds [Lead Imp:  ___ohms] : lead impedance was [unfilled] ohms [Sensing Amplitude ___mv] : sensing amplitude was [unfilled] mv [___V @] : [unfilled] V [___ ms] : [unfilled] ms [None] : none [de-identified] : 9/10/15 [de-identified] : Mitchell HAMM VR [de-identified] : [id number] [de-identified] : > 5 years to ISIAH  [de-identified] : No events\par  65%\par OptiVol threshold crossed in Nov-Jan, back to baseline now [de-identified] : 50

## 2019-05-01 NOTE — PHYSICAL EXAM
[Well Groomed] : well groomed [General Appearance - Well Developed] : well developed [Normal Appearance] : normal appearance [No Deformities] : no deformities [General Appearance - Well Nourished] : well nourished [General Appearance - In No Acute Distress] : no acute distress [Clean] : clean [Abdominal] : abdominal area [Dry] : dry [Well-Healed] : well-healed [Bleeding] : no active bleeding [Palpable Crepitus] : no palpable crepitus [Foul Odor] : no foul smell [Serosanguineous Drainage] : no serosanquineous drainage [Purulent Drainage] : no purulent drainage [Erythema] : not erythematous [Serous Drainage] : no serous drainage [Warm] : not warm [Tender] : not tender [Indurated] : not indurated [Fluctuant] : not fluctuant

## 2019-05-03 ENCOUNTER — TELEPHONE (OUTPATIENT)
Dept: CARDIOLOGY CLINIC | Facility: CLINIC | Age: 79
End: 2019-05-03

## 2019-05-07 ENCOUNTER — HOSPITAL ENCOUNTER (OUTPATIENT)
Dept: NON INVASIVE DIAGNOSTICS | Facility: CLINIC | Age: 79
Discharge: HOME | End: 2019-05-07
Payer: OTHER MISCELLANEOUS

## 2019-05-07 ENCOUNTER — HOSPITAL ENCOUNTER (OUTPATIENT)
Dept: NON INVASIVE DIAGNOSTICS | Facility: CLINIC | Age: 79
Discharge: HOME | End: 2019-05-07

## 2019-05-07 DIAGNOSIS — R06.02 SHORTNESS OF BREATH: ICD-10-CM

## 2019-05-07 LAB
MAX DIASTOLIC BP: 56 MMHG
MAX HEART RATE: 98 BPM
MAX PREDICTED HEART RATE: 141 BPM
MAX. SYSTOLIC BP: 134 MMHG
PROTOCOL NAME: NORMAL
REASON FOR TERMINATION: NORMAL
TARGET HR FORMULA: NORMAL
TEST INDICATION: NORMAL
TIME IN EXERCISE PHASE: NORMAL

## 2019-05-07 PROCEDURE — A9502 TC99M TETROFOSMIN: HCPCS

## 2019-05-07 PROCEDURE — 78452 HT MUSCLE IMAGE SPECT MULT: CPT

## 2019-05-07 PROCEDURE — 93017 CV STRESS TEST TRACING ONLY: CPT

## 2019-05-07 RX ADMIN — REGADENOSON 0.4 MG: 0.08 INJECTION, SOLUTION INTRAVENOUS at 13:20

## 2019-05-08 PROCEDURE — 78452 HT MUSCLE IMAGE SPECT MULT: CPT | Performed by: INTERNAL MEDICINE

## 2019-05-08 PROCEDURE — 93018 CV STRESS TEST I&R ONLY: CPT | Performed by: INTERNAL MEDICINE

## 2019-05-08 PROCEDURE — 93016 CV STRESS TEST SUPVJ ONLY: CPT | Performed by: INTERNAL MEDICINE

## 2019-05-16 ENCOUNTER — APPOINTMENT (OUTPATIENT)
Dept: HEART AND VASCULAR | Facility: CLINIC | Age: 79
End: 2019-05-16

## 2019-05-20 ENCOUNTER — APPOINTMENT (OUTPATIENT)
Dept: HEART AND VASCULAR | Facility: CLINIC | Age: 79
End: 2019-05-20
Payer: OTHER MISCELLANEOUS

## 2019-05-20 VITALS
SYSTOLIC BLOOD PRESSURE: 136 MMHG | WEIGHT: 180 LBS | BODY MASS INDEX: 26.66 KG/M2 | DIASTOLIC BLOOD PRESSURE: 64 MMHG | HEART RATE: 64 BPM | HEIGHT: 69 IN

## 2019-05-20 PROCEDURE — 99213 OFFICE O/P EST LOW 20 MIN: CPT | Mod: 25

## 2019-05-20 PROCEDURE — 93282 PRGRMG EVAL IMPLANTABLE DFB: CPT

## 2019-05-20 NOTE — PHYSICAL EXAM
[General Appearance - Well Developed] : well developed [Normal Appearance] : normal appearance [Well Groomed] : well groomed [General Appearance - Well Nourished] : well nourished [No Deformities] : no deformities [General Appearance - In No Acute Distress] : no acute distress [Abdominal] : abdominal area [Clean] : clean [Dry] : dry [Well-Healed] : well-healed [Heart Rate And Rhythm] : heart rate and rhythm were normal [Heart Sounds] : normal S1 and S2 [Murmurs] : no murmurs present [Respiration, Rhythm And Depth] : normal respiratory rhythm and effort [Exaggerated Use Of Accessory Muscles For Inspiration] : no accessory muscle use [Auscultation Breath Sounds / Voice Sounds] : lungs were clear to auscultation bilaterally [Palpable Crepitus] : no palpable crepitus [Bleeding] : no active bleeding [Foul Odor] : no foul smell [Purulent Drainage] : no purulent drainage [Serosanguineous Drainage] : no serosanquineous drainage [Serous Drainage] : no serous drainage [Erythema] : not erythematous [Warm] : not warm [Tender] : not tender [Indurated] : not indurated [Fluctuant] : not fluctuant [Abdomen Soft] : soft [Abdomen Tenderness] : non-tender [Abdomen Mass (___ Cm)] : no abdominal mass palpated [FreeTextEntry1] : Abdominal ICD site [Nail Clubbing] : no clubbing of the fingernails [Cyanosis, Localized] : no localized cyanosis [Petechial Hemorrhages (___cm)] : no petechial hemorrhages [] : no ischemic changes

## 2019-05-20 NOTE — PROCEDURE
[No] : not [ICD] : Implantable cardioverter-defibrillator [Medtronic] : Medtronic [VVI] : VVI [Charge Time: ___ sec] : charge time was [unfilled] seconds [Normal] : The battery status is normal. [Ventricular] : Ventricular [Lead Imp:  ___ohms] : lead impedance was [unfilled] ohms [Sensing Amplitude ___mv] : sensing amplitude was [unfilled] mv [___V @] : [unfilled] V [___ ms] : [unfilled] ms [None] : none [de-identified] : Mitchell HAMM VR [de-identified] : [id number] [de-identified] : 9/10/15 [de-identified] : 49 [de-identified] : 7.7 years to ISIAH  [de-identified] : RV lead integrity alert was activated.  Coincided with the EDGAR from the procedure a couple of days ago.\par  65%\par OptiVol threshold crossed in Nov-Jan, back to baseline now

## 2019-05-20 NOTE — HISTORY OF PRESENT ILLNESS
[None] : The patient complains of no symptoms [de-identified] : This is a 79 yo man with an abdominal ICD system in place for secondary prevention of VT, original implant 1999, with generator change 2006.  His ICD reached the [facility] and he underwent generator change 9/10/15.  He recently had electrical stimulation of his lower extremities for PVD treatment and then he heard the high alert beeping from his device.  He otherwise feels fine.\par \par The patient denies chest pain, SOB, BASILIO, palpitation, light headedness, syncope or change in exertional capacity.\par

## 2019-05-21 ENCOUNTER — TELEPHONE (OUTPATIENT)
Dept: CARDIOLOGY CLINIC | Facility: CLINIC | Age: 79
End: 2019-05-21

## 2019-06-18 DIAGNOSIS — I10 HYPERTENSION, ESSENTIAL: ICD-10-CM

## 2019-06-18 RX ORDER — ATENOLOL 25 MG/1
TABLET ORAL
Qty: 90 TABLET | Refills: 2 | Status: SHIPPED | OUTPATIENT
Start: 2019-06-18 | End: 2020-02-12

## 2019-06-24 ENCOUNTER — ANTICOAG VISIT (OUTPATIENT)
Dept: CARDIOLOGY CLINIC | Facility: CLINIC | Age: 79
End: 2019-06-24

## 2019-06-24 LAB — INR PPP: 2.6 (ref 0.86–1.17)

## 2019-06-28 ENCOUNTER — OFFICE VISIT (OUTPATIENT)
Dept: CARDIOLOGY CLINIC | Facility: CLINIC | Age: 79
End: 2019-06-28
Payer: OTHER MISCELLANEOUS

## 2019-06-28 VITALS
HEIGHT: 71 IN | SYSTOLIC BLOOD PRESSURE: 130 MMHG | BODY MASS INDEX: 25.48 KG/M2 | WEIGHT: 182 LBS | DIASTOLIC BLOOD PRESSURE: 72 MMHG | HEART RATE: 59 BPM | OXYGEN SATURATION: 96 %

## 2019-06-28 DIAGNOSIS — I10 HYPERTENSION, ESSENTIAL: ICD-10-CM

## 2019-06-28 DIAGNOSIS — Z79.01 LONG TERM CURRENT USE OF ANTICOAGULANT: ICD-10-CM

## 2019-06-28 DIAGNOSIS — I35.9 AORTIC VALVE DISORDER: Primary | ICD-10-CM

## 2019-06-28 DIAGNOSIS — I71.2 THORACIC AORTIC ANEURYSM WITHOUT RUPTURE (HCC): ICD-10-CM

## 2019-06-28 DIAGNOSIS — F41.9 ANXIETY: ICD-10-CM

## 2019-06-28 DIAGNOSIS — I47.2 VENTRICULAR TACHYCARDIA (HCC): ICD-10-CM

## 2019-06-28 PROCEDURE — 99214 OFFICE O/P EST MOD 30 MIN: CPT | Performed by: INTERNAL MEDICINE

## 2019-06-28 RX ORDER — TAMSULOSIN HYDROCHLORIDE 0.4 MG/1
0.4 CAPSULE ORAL
COMMUNITY

## 2019-06-28 RX ORDER — ALPRAZOLAM 0.25 MG/1
0.25 TABLET ORAL 2 TIMES DAILY PRN
Qty: 60 TABLET | Refills: 0 | Status: SHIPPED | OUTPATIENT
Start: 2019-06-28 | End: 2020-04-09 | Stop reason: SDUPTHER

## 2019-07-01 ENCOUNTER — TELEPHONE (OUTPATIENT)
Dept: CARDIOLOGY CLINIC | Facility: CLINIC | Age: 79
End: 2019-07-01

## 2019-07-01 NOTE — TELEPHONE ENCOUNTER
----- Message from Cristy Khan MD sent at 6/28/2019 11:15 PM EDT -----  Regarding:   Letter in the chart   There is a letter in the chart dated 6/28/2019  Please check with daughter where this needs to be faxed and fax it  Thank you

## 2019-07-23 ENCOUNTER — ANTICOAG VISIT (OUTPATIENT)
Dept: CARDIOLOGY CLINIC | Facility: CLINIC | Age: 79
End: 2019-07-23

## 2019-07-23 LAB — INR PPP: 2.5 (ref 0.84–1.19)

## 2019-08-01 ENCOUNTER — TELEPHONE (OUTPATIENT)
Dept: CARDIOLOGY CLINIC | Facility: CLINIC | Age: 79
End: 2019-08-01

## 2019-08-01 NOTE — TELEPHONE ENCOUNTER
Med list stated Amlodipine was discontinued  S/w pt's daughter Dennis Agee and confirmed pt is not taking med

## 2019-08-01 NOTE — TELEPHONE ENCOUNTER
I do not think he is on this medication presently  Please check with patient's daughter who is a physician

## 2019-08-19 ENCOUNTER — TELEPHONE (OUTPATIENT)
Dept: CARDIOLOGY CLINIC | Facility: CLINIC | Age: 79
End: 2019-08-19

## 2019-08-19 NOTE — TELEPHONE ENCOUNTER
Tried calling [facility] to update pt's med list and could not get through to pharmacy will call again later

## 2019-09-18 ENCOUNTER — TELEPHONE (OUTPATIENT)
Dept: CARDIOLOGY CLINIC | Facility: CLINIC | Age: 79
End: 2019-09-18

## 2019-09-18 NOTE — TELEPHONE ENCOUNTER
Spoke with patient's son he advised the patient just came [place] from [place] of the country  They will follow up and get INR checked as soon as possible

## 2019-09-19 ENCOUNTER — ANTICOAG VISIT (OUTPATIENT)
Dept: CARDIOLOGY CLINIC | Facility: CLINIC | Age: 79
End: 2019-09-19

## 2019-09-19 LAB — INR PPP: 3.8 (ref 0.84–1.19)

## 2019-09-27 ENCOUNTER — APPOINTMENT (OUTPATIENT)
Dept: HEART AND VASCULAR | Facility: CLINIC | Age: 79
End: 2019-09-27

## 2019-10-22 ENCOUNTER — ANTICOAG VISIT (OUTPATIENT)
Dept: CARDIOLOGY CLINIC | Facility: CLINIC | Age: 79
End: 2019-10-22

## 2019-10-22 LAB — INR PPP: 2.4 (ref 0.84–1.19)

## 2019-11-28 DIAGNOSIS — I35.9 AORTIC VALVE DISORDER: ICD-10-CM

## 2019-11-28 RX ORDER — WARFARIN SODIUM 5 MG/1
TABLET ORAL
Qty: 90 TABLET | Refills: 4 | Status: SHIPPED | OUTPATIENT
Start: 2019-11-28

## 2020-02-05 ENCOUNTER — TELEPHONE (OUTPATIENT)
Dept: CARDIOLOGY CLINIC | Facility: CLINIC | Age: 80
End: 2020-02-05

## 2020-02-05 NOTE — TELEPHONE ENCOUNTER
Called and s/w daughter to remind him that pt is over due to have INR drawn  She said it was just done and he was normal and she will have the results faxed to us

## 2020-02-12 DIAGNOSIS — I10 HYPERTENSION, ESSENTIAL: ICD-10-CM

## 2020-02-12 RX ORDER — ATENOLOL 25 MG/1
TABLET ORAL
Qty: 90 TABLET | Refills: 4 | Status: SHIPPED | OUTPATIENT
Start: 2020-02-12 | End: 2020-10-30 | Stop reason: ALTCHOICE

## 2020-02-20 ENCOUNTER — ANTICOAG VISIT (OUTPATIENT)
Dept: CARDIOLOGY CLINIC | Facility: CLINIC | Age: 80
End: 2020-02-20

## 2020-02-20 LAB — INR PPP: 3 (ref 0.84–1.19)

## 2020-04-09 DIAGNOSIS — F41.9 ANXIETY: ICD-10-CM

## 2020-04-09 RX ORDER — ALPRAZOLAM 0.25 MG/1
0.25 TABLET ORAL 2 TIMES DAILY PRN
Qty: 90 TABLET | Refills: 0 | Status: SHIPPED | OUTPATIENT
Start: 2020-04-09 | End: 2020-10-30 | Stop reason: SDUPTHER

## 2020-05-06 DIAGNOSIS — E78.2 COMBINED HYPERLIPIDEMIA: ICD-10-CM

## 2020-05-06 DIAGNOSIS — I47.2 VENTRICULAR TACHYCARDIA (HCC): ICD-10-CM

## 2020-05-06 RX ORDER — AMIODARONE HYDROCHLORIDE 200 MG/1
TABLET ORAL
Qty: 90 TABLET | Refills: 3 | Status: SHIPPED | OUTPATIENT
Start: 2020-05-06

## 2020-05-06 RX ORDER — ATORVASTATIN CALCIUM 20 MG/1
TABLET, FILM COATED ORAL
Qty: 90 TABLET | Refills: 3 | Status: SHIPPED | OUTPATIENT
Start: 2020-05-06

## 2020-07-07 ENCOUNTER — ANTICOAG VISIT (OUTPATIENT)
Dept: CARDIOLOGY CLINIC | Facility: CLINIC | Age: 80
End: 2020-07-07

## 2020-07-07 LAB — INR PPP: 3.5 (ref 0.84–1.19)

## 2020-08-04 ENCOUNTER — ANTICOAG VISIT (OUTPATIENT)
Dept: CARDIOLOGY CLINIC | Facility: CLINIC | Age: 80
End: 2020-08-04

## 2020-08-04 LAB — INR PPP: 2.7 (ref 0.84–1.19)

## 2020-10-13 ENCOUNTER — APPOINTMENT (OUTPATIENT)
Dept: HEART AND VASCULAR | Facility: CLINIC | Age: 80
End: 2020-10-13
Payer: MEDICARE

## 2020-10-13 VITALS — HEART RATE: 49 BPM | SYSTOLIC BLOOD PRESSURE: 144 MMHG | HEIGHT: 69 IN | DIASTOLIC BLOOD PRESSURE: 65 MMHG

## 2020-10-13 PROCEDURE — 93282 PRGRMG EVAL IMPLANTABLE DFB: CPT

## 2020-10-13 RX ORDER — OXYCODONE AND ACETAMINOPHEN 5; 325 MG/1; MG/1
5-325 TABLET ORAL
Qty: 30 | Refills: 0 | Status: DISCONTINUED | COMMUNITY
End: 2020-10-13

## 2020-10-13 NOTE — PHYSICAL EXAM
[General Appearance - Well Developed] : well developed [Normal Appearance] : normal appearance [Well Groomed] : well groomed [General Appearance - Well Nourished] : well nourished [No Deformities] : no deformities [General Appearance - In No Acute Distress] : no acute distress [Heart Rate And Rhythm] : heart rate and rhythm were normal [Heart Sounds] : normal S1 and S2 [Murmurs] : no murmurs present [Respiration, Rhythm And Depth] : normal respiratory rhythm and effort [Exaggerated Use Of Accessory Muscles For Inspiration] : no accessory muscle use [Auscultation Breath Sounds / Voice Sounds] : lungs were clear to auscultation bilaterally [Abdominal] : abdominal area [Clean] : clean [Dry] : dry [Well-Healed] : well-healed [Abdomen Soft] : soft [Abdomen Tenderness] : non-tender [Abdomen Mass (___ Cm)] : no abdominal mass palpated [Nail Clubbing] : no clubbing of the fingernails [Cyanosis, Localized] : no localized cyanosis [Petechial Hemorrhages (___cm)] : no petechial hemorrhages [] : no ischemic changes [Palpable Crepitus] : no palpable crepitus [Bleeding] : no active bleeding [Foul Odor] : no foul smell [Purulent Drainage] : no purulent drainage [Serosanguineous Drainage] : no serosanquineous drainage [Serous Drainage] : no serous drainage [Erythema] : not erythematous [Warm] : not warm [Tender] : not tender [Indurated] : not indurated [Fluctuant] : not fluctuant [FreeTextEntry1] : Abdominal ICD site

## 2020-10-13 NOTE — HISTORY OF PRESENT ILLNESS
[None] : The patient complains of no symptoms [de-identified] : This is an 81 yo man with an abdominal ICD system in place for secondary prevention of VT, original implant 1999, with generator change 2006.  His ICD reached the ISIAH and he underwent generator change 9/10/15.  \par \par History significant for electrical stimulation of his lower extremities for PVD treatment and then he heard the high alert beeping from his device.  RV lead integrity alert was activated and reset- no further events.  \par \par His daughter is concerned that his HR has decreased to the 50 bpm range and noted fatigue around the same time.  The patient denies chest pain, SOB, BASILIO, palpitation, light headedness, syncope.\par

## 2020-10-13 NOTE — PROCEDURE
[No] : not [ICD] : Implantable cardioverter-defibrillator [Medtronic] : Medtronic [VVI] : VVI [Charge Time: ___ sec] : charge time was [unfilled] seconds [Normal] : The battery status is normal. [Ventricular] : Ventricular [Lead Imp:  ___ohms] : lead impedance was [unfilled] ohms [Sensing Amplitude ___mv] : sensing amplitude was [unfilled] mv [___V @] : [unfilled] V [___ ms] : [unfilled] ms [None] : none [de-identified] : Mitchell HAMM VR [de-identified] : [id number] [de-identified] : 9/10/15 [de-identified] : 49 [de-identified] : 6 years to ISIAH  [de-identified] :  16.6%\par Optivol threshold at baseline \par Percentage of pacing increased ~ September, coincides with reduced activity noted by device

## 2020-10-30 ENCOUNTER — OFFICE VISIT (OUTPATIENT)
Dept: CARDIOLOGY CLINIC | Facility: CLINIC | Age: 80
End: 2020-10-30
Payer: OTHER MISCELLANEOUS

## 2020-10-30 VITALS
WEIGHT: 180 LBS | DIASTOLIC BLOOD PRESSURE: 60 MMHG | OXYGEN SATURATION: 98 % | TEMPERATURE: 98.4 F | SYSTOLIC BLOOD PRESSURE: 128 MMHG | HEART RATE: 50 BPM | HEIGHT: 71 IN | BODY MASS INDEX: 25.2 KG/M2

## 2020-10-30 DIAGNOSIS — I71.2 THORACIC AORTIC ANEURYSM WITHOUT RUPTURE (HCC): ICD-10-CM

## 2020-10-30 DIAGNOSIS — I10 HYPERTENSION, ESSENTIAL: ICD-10-CM

## 2020-10-30 DIAGNOSIS — F41.9 ANXIETY: ICD-10-CM

## 2020-10-30 DIAGNOSIS — I47.2 VENTRICULAR TACHYCARDIA (HCC): ICD-10-CM

## 2020-10-30 DIAGNOSIS — G44.1 OTHER VASCULAR HEADACHE: ICD-10-CM

## 2020-10-30 DIAGNOSIS — E78.2 COMBINED HYPERLIPIDEMIA: ICD-10-CM

## 2020-10-30 DIAGNOSIS — I35.9 AORTIC VALVE DISORDER: Primary | ICD-10-CM

## 2020-10-30 DIAGNOSIS — G44.011 INTRACTABLE EPISODIC CLUSTER HEADACHE: ICD-10-CM

## 2020-10-30 PROCEDURE — 99214 OFFICE O/P EST MOD 30 MIN: CPT | Performed by: INTERNAL MEDICINE

## 2020-10-30 RX ORDER — ALPRAZOLAM 0.25 MG/1
0.25 TABLET ORAL 2 TIMES DAILY PRN
Qty: 90 TABLET | Refills: 0 | Status: SHIPPED | OUTPATIENT
Start: 2020-10-30 | End: 2021-07-26 | Stop reason: SDUPTHER

## 2020-10-30 RX ORDER — LOSARTAN POTASSIUM 50 MG/1
50 TABLET ORAL 2 TIMES DAILY
COMMUNITY

## 2020-11-03 ENCOUNTER — TELEPHONE (OUTPATIENT)
Dept: CARDIOLOGY CLINIC | Facility: CLINIC | Age: 80
End: 2020-11-03

## 2020-11-20 ENCOUNTER — HOSPITAL ENCOUNTER (OUTPATIENT)
Dept: CT IMAGING | Facility: CLINIC | Age: 80
Discharge: HOME | End: 2020-11-20
Payer: MEDICARE

## 2020-11-20 ENCOUNTER — HOSPITAL ENCOUNTER (OUTPATIENT)
Dept: CT IMAGING | Facility: CLINIC | Age: 80
Discharge: HOME | End: 2020-11-20
Payer: OTHER MISCELLANEOUS

## 2020-11-20 DIAGNOSIS — G44.011 INTRACTABLE EPISODIC CLUSTER HEADACHE: ICD-10-CM

## 2020-11-20 DIAGNOSIS — I71.2 THORACIC AORTIC ANEURYSM WITHOUT RUPTURE (HCC): ICD-10-CM

## 2020-11-20 PROCEDURE — 71250 CT THORAX DX C-: CPT

## 2020-11-20 PROCEDURE — 70450 CT HEAD/BRAIN W/O DYE: CPT

## 2020-11-20 PROCEDURE — G1004 CDSM NDSC: HCPCS

## 2020-11-23 ENCOUNTER — TELEPHONE (OUTPATIENT)
Dept: CARDIOLOGY CLINIC | Facility: CLINIC | Age: 80
End: 2020-11-23

## 2020-12-10 ENCOUNTER — TELEPHONE (OUTPATIENT)
Dept: CARDIOLOGY CLINIC | Facility: CLINIC | Age: 80
End: 2020-12-10

## 2020-12-10 NOTE — TELEPHONE ENCOUNTER
Called number provided  S/w Mary Lerma, he stated that that the Alprazolam was rejected by patient's  Lidia Bennett because medication was not related to workers comp claim  Mary Lerma stated that patient can either pay out of pocket or be be represented by  to prove that med is related to claim  Ref# same as below

## 2020-12-10 NOTE — TELEPHONE ENCOUNTER
[phone] Dr Renetta Peralta (pt's son)    Pt son called stating the formulary form for pt rx was incorrectly filled out, any questions please call him

## 2020-12-10 NOTE — TELEPHONE ENCOUNTER
S/w patient's son, he stated that form is from [employer] regarding a new formulary drug for Alprazolam  #[id number] & Ref# [id number]

## 2020-12-17 ENCOUNTER — APPOINTMENT (OUTPATIENT)
Dept: HEART AND VASCULAR | Facility: CLINIC | Age: 80
End: 2020-12-17

## 2021-03-25 ENCOUNTER — TRANSCRIBE ORDERS (OUTPATIENT)
Dept: ADMINISTRATIVE | Facility: HOSPITAL | Age: 81
End: 2021-03-25

## 2021-03-25 DIAGNOSIS — M54.9 BACK PAIN, UNSPECIFIED BACK LOCATION, UNSPECIFIED BACK PAIN LATERALITY, UNSPECIFIED CHRONICITY: Primary | ICD-10-CM

## 2021-03-26 ENCOUNTER — TRANSCRIBE ORDERS (OUTPATIENT)
Dept: ADMINISTRATIVE | Facility: HOSPITAL | Age: 81
End: 2021-03-26

## 2021-03-26 DIAGNOSIS — I73.9 PAD (PERIPHERAL ARTERY DISEASE) (HCC): ICD-10-CM

## 2021-03-26 DIAGNOSIS — M79.89 CALF SWELLING: ICD-10-CM

## 2021-03-26 DIAGNOSIS — R09.89 DIMINISHED PULSE: ICD-10-CM

## 2021-03-26 DIAGNOSIS — M79.661 BILATERAL CALF PAIN: Primary | ICD-10-CM

## 2021-03-26 DIAGNOSIS — M79.662 BILATERAL CALF PAIN: Primary | ICD-10-CM

## 2021-03-29 ENCOUNTER — HOSPITAL ENCOUNTER (OUTPATIENT)
Dept: CT IMAGING | Facility: CLINIC | Age: 81
Discharge: HOME | End: 2021-03-29
Payer: OTHER MISCELLANEOUS

## 2021-03-29 DIAGNOSIS — M54.9 BACK PAIN, UNSPECIFIED BACK LOCATION, UNSPECIFIED BACK PAIN LATERALITY, UNSPECIFIED CHRONICITY: ICD-10-CM

## 2021-03-29 PROCEDURE — 72131 CT LUMBAR SPINE W/O DYE: CPT

## 2021-03-29 PROCEDURE — 72125 CT NECK SPINE W/O DYE: CPT

## 2021-03-29 PROCEDURE — 72128 CT CHEST SPINE W/O DYE: CPT

## 2021-03-30 ENCOUNTER — APPOINTMENT (OUTPATIENT)
Dept: HEART AND VASCULAR | Facility: CLINIC | Age: 81
End: 2021-03-30
Payer: OTHER MISCELLANEOUS

## 2021-03-30 ENCOUNTER — NON-APPOINTMENT (OUTPATIENT)
Age: 81
End: 2021-03-30

## 2021-03-30 PROCEDURE — 93295 DEV INTERROG REMOTE 1/2/MLT: CPT

## 2021-03-30 PROCEDURE — 93296 REM INTERROG EVL PM/IDS: CPT

## 2021-06-21 ENCOUNTER — OFFICE VISIT (OUTPATIENT)
Dept: CARDIOLOGY CLINIC | Facility: CLINIC | Age: 81
End: 2021-06-21
Payer: OTHER MISCELLANEOUS

## 2021-06-21 VITALS
BODY MASS INDEX: 26.6 KG/M2 | HEART RATE: 50 BPM | WEIGHT: 190 LBS | SYSTOLIC BLOOD PRESSURE: 144 MMHG | DIASTOLIC BLOOD PRESSURE: 70 MMHG | OXYGEN SATURATION: 97 % | HEIGHT: 71 IN

## 2021-06-21 DIAGNOSIS — I10 HYPERTENSION, ESSENTIAL: ICD-10-CM

## 2021-06-21 DIAGNOSIS — I71.2 THORACIC AORTIC ANEURYSM WITHOUT RUPTURE (HCC): ICD-10-CM

## 2021-06-21 DIAGNOSIS — I35.9 AORTIC VALVE DISORDER: Primary | ICD-10-CM

## 2021-06-21 DIAGNOSIS — Z79.01 LONG TERM CURRENT USE OF ANTICOAGULANT: ICD-10-CM

## 2021-06-21 DIAGNOSIS — M54.50 LOW BACK PAIN, UNSPECIFIED BACK PAIN LATERALITY, UNSPECIFIED CHRONICITY, UNSPECIFIED WHETHER SCIATICA PRESENT: ICD-10-CM

## 2021-06-21 DIAGNOSIS — F41.9 ANXIETY: ICD-10-CM

## 2021-06-21 DIAGNOSIS — R06.02 SHORTNESS OF BREATH: ICD-10-CM

## 2021-06-21 DIAGNOSIS — E78.2 COMBINED HYPERLIPIDEMIA: ICD-10-CM

## 2021-06-21 DIAGNOSIS — I47.2 VENTRICULAR TACHYCARDIA (HCC): ICD-10-CM

## 2021-06-21 PROCEDURE — 99214 OFFICE O/P EST MOD 30 MIN: CPT | Performed by: INTERNAL MEDICINE

## 2021-06-21 NOTE — PROGRESS NOTES
[facility] [address] [address]  Cardiology Follow Up    Lonnie Baird  1940  [id number]      1  Aortic valve disorder     2  Ventricular tachycardia ([address] )     3  Hypertension, essential     4  Thoracic aortic aneurysm without rupture ([facility] [address] )     5  Combined hyperlipidemia     6  Anxiety     7  Long term current use of anticoagulant         Chief Complaint   Patient presents with    Follow-up       Interval History:   Patient presents for follow-up visit  Patient has multiple medical problems including history of dilated thoracic aorta  History of prosthetic mechanical aortic valve placement hypertension as well as history of ventricular tachycardia status post ICD  Patient also has history of anxiety  Patient had blood work through primary care physician  Patient also states that he had a CT of the chest which was ordered last time done through primary care physician  He does have some shortness of breath with exertion  No history of palpitations  No history of ICD discharges  He is followed by the electrophysiologist in [place] for ICD      Patient Active Problem List   Diagnosis    Aortic valve disorder    Hypertension, essential    Thoracic aortic aneurysm, without rupture ([facility] [address] )    Ventricular tachycardia (HCC)     Past Medical History:   Diagnosis Date    Aortic valve stenosis     AVD (aortic valve disease)     Chest pain     CHF (congestive heart failure) (HCC)     Chronic obstructive lung disease (HCC)     Dizziness     Dyspepsia     Fatigue     Headache     HLD (hyperlipidemia)     HTN (hypertension)     Lumbar radiculopathy     Native stenosis of renal artery (HCC)     Nephrolithiasis     Precordial chest pain     Respiratory abnormality     Sleep apnea     SOB (shortness of breath)     Ventricular tachycardia (HCC)      Social History     Socioeconomic History    Marital status: /Civil Union Spouse name: Not on file    Number of children: Not on file    Years of education: Not on file    Highest education level: Not on file   Occupational History    Not on file   Tobacco Use    Smoking status: Former Smoker    Smokeless tobacco: Never Used   Substance and Sexual Activity    Alcohol use: Never    Drug use: Never    Sexual activity: Not on file   Other Topics Concern    Not on file   Social History Narrative    Not on file     Social Determinants of Health     Financial Resource Strain:     Difficulty of Paying Living Expenses:    Food Insecurity:     Worried About [address] [employer] in the Last Year:     [address] [employer] in the Last Year:    Transportation Needs:     Lack of Transportation (Medical):      Lack of Transportation (Non-Medical):    Physical Activity:     Days of Exercise per Week:     Minutes of Exercise per Session:    Stress:     Feeling of Stress :    Social Connections:     Frequency of Communication with Friends and Family:     Frequency of Social Gatherings with Friends and Family:     Attends [employer] Services:     Active Member of Clubs or Organizations:     Attends Club or Organization Meetings:     Marital Status:    Intimate Partner Violence:     Fear of Current or Ex-Partner:     Emotionally Abused:     Physically Abused:     Sexually Abused:       Family History   Problem Relation Age of Onset    Heart disease Sister     Pneumonia Family      Past Surgical History:   Procedure Laterality Date    APPENDECTOMY      CARDIAC DEFIBRILLATOR PLACEMENT      CARDIAC VALVE REPLACEMENT         Current Outpatient Medications:     ALPRAZolam (XANAX) 0 25 mg tablet, Take 1 tablet (0 25 mg total) by mouth 2 (two) times a day as needed for anxiety, Disp: 90 tablet, Rfl: 0    amiodarone 200 mg tablet, TAKE ONE-HALF (1/2) TABLET DAILY, Disp: 90 tablet, Rfl: 3    atorvastatin (LIPITOR) 20 mg tablet, TAKE 1 TABLET AT BEDTIME, Disp: 90 tablet, Rfl: 3   losartan (COZAAR) 50 mg tablet, Take 50 mg by mouth 2 (two) times a day, Disp: , Rfl:     pantoprazole (PROTONIX) 40 mg tablet, Take 1 tablet (40 mg total) by mouth daily, Disp: 90 tablet, Rfl: 3    pantoprazole (PROTONIX) 40 mg tablet, TAKE 1 TABLET DAILY, Disp: 90 tablet, Rfl: 2    spironolactone (ALDACTONE) 25 mg tablet, TAKE 1 TABLET DAILY, Disp: 90 tablet, Rfl: 3    tamsulosin (FLOMAX) 0 4 mg, Take 0 4 mg by mouth daily with dinner, Disp: , Rfl:     tiotropium-olodaterol (STIOLTO RESPIMAT) 2 5-2 5 MCG/ACT inhaler, Inhale 2 puffs daily, Disp: , Rfl:     warfarin (COUMADIN) 5 mg tablet, TAKE 1 TABLET DAILY OR AS DIRECTED, Disp: 90 tablet, Rfl: 4  Allergies   Allergen Reactions    Metoprolol        Labs:  No visits with results within 2 Month(s) from this visit  Latest known visit with results is:   Anticoag visit on 08/04/2020   Component Date Value    INR 08/04/2020 2 70*     Imaging: No results found  Review of Systems:  Review of Systems     REVIEW OF SYSTEMS:  Constitutional:  Denies fever or chills   Eyes:  Denies change in visual acuity   HENT:  Denies nasal congestion or sore throat   Respiratory:   shortness of breath   Cardiovascular:  Denies chest pain or edema   GI:  Denies abdominal pain, nausea, vomiting, bloody stools or diarrhea   :  Denies dysuria, frequency, difficulty in micturition and nocturia  Musculoskeletal:  DJD  Neurologic:  Denies headache, focal weakness or sensory changes   Endocrine:  Denies polyuria or polydipsia   Lymphatic:  Denies swollen glands   Psychiatric:  Denies depression or anxiety     Physical Exam:    /70   Pulse (!) 50   Ht 5' 11" (1 803 m)   Wt 86 2 kg (190 lb)   SpO2 97%   BMI 26 50 kg/m²     Physical Exam   PHYSICAL EXAM:  General:  Patient is not in acute distress   Head: Normocephalic, Atraumatic  HEENT:  Both pupils normal-size atraumatic, normocephalic, nonicteric  Neck:  JVP not raised   Trachea central  No carotid bruit  Respiratory: normal breath sounds no crackles  no rhonchi  Cardiovascular:  Regular rate and rhythm no S3 no murmurs  Heart sounds consistent with prosthetic mechanical valve  GI:  Abdomen soft nontender  No organomegaly  Lymphatic:  No cervical or inguinal lymphadenopathy  Neurologic:  Patient is awake alert, oriented   Grossly nonfocal    Extremities no edema    Discussion/Summary:    Patient with multiple medical problems who seems to be doing reasonably well from cardiac standpoint  Previous studies reviewed with patient  Medications reviewed and possible side effects discussed  concepts of cardiovascular disease , signs and symptoms of heart disease  Dietary and risk factor modification reinforced  All questions answered  Safety measures reviewed  Patient advised to report any problems prompting medical attention  Given symptoms of shortness of breath with exertion and multiple risk factors for coronary artery disease, patient will be scheduled for pharmacological nuclear stress test to assess for ischemia  Patient is unable to exercise on the treadmill because of arthritis  Referral to physical therapy  Patient understands the risks and benefits of anticoagulation  Patient interested in home PT INR monitoring  Will try to arrange for the same  Antibiotic prophylaxis to continue  Emotional support provided to the patient with history of anxiety  Follow-up in 4 to 6 months or earlier as needed  Patient is agreeable with the plan of care

## 2021-06-29 ENCOUNTER — APPOINTMENT (OUTPATIENT)
Dept: HEART AND VASCULAR | Facility: CLINIC | Age: 81
End: 2021-06-29
Payer: OTHER MISCELLANEOUS

## 2021-06-29 ENCOUNTER — NON-APPOINTMENT (OUTPATIENT)
Age: 81
End: 2021-06-29

## 2021-06-29 PROCEDURE — 93295 DEV INTERROG REMOTE 1/2/MLT: CPT

## 2021-06-29 PROCEDURE — 93296 REM INTERROG EVL PM/IDS: CPT

## 2021-07-23 ENCOUNTER — TELEPHONE (OUTPATIENT)
Dept: CARDIOLOGY CLINIC | Facility: CLINIC | Age: 81
End: 2021-07-23

## 2021-07-23 NOTE — TELEPHONE ENCOUNTER
----- Message from Ozzie Donohue MD sent at 7/22/2021  4:55 PM EDT -----  Regarding: preop risk assessment letter   Please fax the letter dated today in the chart to    21 [phone]  And call daughter Elzbieta Chavira [phone]  to let her know that we did

## 2021-07-26 DIAGNOSIS — F41.9 ANXIETY: ICD-10-CM

## 2021-07-26 RX ORDER — ALPRAZOLAM 0.25 MG/1
0.25 TABLET ORAL 2 TIMES DAILY PRN
Qty: 90 TABLET | Refills: 0 | Status: SHIPPED | OUTPATIENT
Start: 2021-07-26

## 2021-07-26 NOTE — TELEPHONE ENCOUNTER
Alprazolam was previously refilled via [facility], but there was an issue with the insurance per wilson  Request is being made by daughter to call same script into [facility], [address] and they will pay out of pocket    Please call daughter at [phone] if there are any questions

## 2021-08-05 ENCOUNTER — ANTICOAG VISIT (OUTPATIENT)
Dept: CARDIOLOGY CLINIC | Facility: CLINIC | Age: 81
End: 2021-08-05

## 2021-08-05 LAB — INR PPP: 2.2 (ref 0.84–1.19)

## 2021-08-17 ENCOUNTER — APPOINTMENT (OUTPATIENT)
Dept: HEART AND VASCULAR | Facility: CLINIC | Age: 81
End: 2021-08-17
Payer: MEDICARE

## 2021-08-17 VITALS
SYSTOLIC BLOOD PRESSURE: 137 MMHG | DIASTOLIC BLOOD PRESSURE: 63 MMHG | WEIGHT: 180 LBS | HEART RATE: 50 BPM | BODY MASS INDEX: 26.66 KG/M2 | HEIGHT: 69 IN

## 2021-08-17 PROCEDURE — 99214 OFFICE O/P EST MOD 30 MIN: CPT | Mod: 25

## 2021-08-17 PROCEDURE — 93282 PRGRMG EVAL IMPLANTABLE DFB: CPT

## 2021-08-17 NOTE — PROCEDURE
[No] : not [ICD] : Implantable cardioverter-defibrillator [Medtronic] : Medtronic [VVI] : VVI [Charge Time: ___ sec] : charge time was [unfilled] seconds [Normal] : The battery status is normal. [Ventricular] : Ventricular [Lead Imp:  ___ohms] : lead impedance was [unfilled] ohms [Sensing Amplitude ___mv] : sensing amplitude was [unfilled] mv [___V @] : [unfilled] V [___ ms] : [unfilled] ms [None] : none [de-identified] : Mitchell HAMM VR [de-identified] : [id number] [de-identified] : 9/10/15 [de-identified] : 50 [de-identified] : 4 years to ISIAH  [de-identified] :  16.6% = > 59.6% since the last visit in October 2021\par Optivol threshold at baseline - It was elevated at the start of the year.\par Percentage of pacing increased  - again, maybe coincides with reduced activity noted by device

## 2021-08-17 NOTE — HISTORY OF PRESENT ILLNESS
[None] : The patient complains of no symptoms [de-identified] : This is an 79 yo man with an abdominal ICD system in place for secondary prevention of VT, original implant 1999, with generator change 2006.  His ICD reached the ISIAH and he underwent generator change 9/10/15.  \par \par He has been feeling well.  No device related complaints.  The patient denies chest pain, SOB, BASILIO, palpitation, light headedness, syncope or change in exertional capacity.\par \par He reports that his HR is predominantly 50 when he checks.  No sigificant symptoms associated with this.

## 2021-09-11 ENCOUNTER — HOSPITAL ENCOUNTER (EMERGENCY)
Facility: HOSPITAL | Age: 81
Discharge: HOME | End: 2021-09-11
Attending: EMERGENCY MEDICINE
Payer: MEDICARE

## 2021-09-11 VITALS
BODY MASS INDEX: 25.55 KG/M2 | RESPIRATION RATE: 18 BRPM | WEIGHT: 183.2 LBS | TEMPERATURE: 98.1 F | SYSTOLIC BLOOD PRESSURE: 174 MMHG | DIASTOLIC BLOOD PRESSURE: 74 MMHG | HEART RATE: 48 BPM | OXYGEN SATURATION: 98 %

## 2021-09-11 DIAGNOSIS — S81.811A: Primary | ICD-10-CM

## 2021-09-11 DIAGNOSIS — R26.9 GAIT DISTURBANCE: ICD-10-CM

## 2021-09-11 PROCEDURE — 99282 EMERGENCY DEPT VISIT SF MDM: CPT

## 2021-09-11 PROCEDURE — 99284 EMERGENCY DEPT VISIT MOD MDM: CPT | Performed by: PHYSICIAN ASSISTANT

## 2021-09-11 RX ORDER — CEPHALEXIN 500 MG/1
500 CAPSULE ORAL 4 TIMES DAILY
Qty: 28 CAPSULE | Refills: 0 | Status: SHIPPED | OUTPATIENT
Start: 2021-09-11 | End: 2021-09-18

## 2021-09-12 NOTE — ED PROVIDER NOTES
History  Chief Complaint   Patient presents with    Extremity Laceration     Pt was scraped by an exposed bolt that was covered with plastic, through his pants on his right leg  This is an 18-year-old male patient who caught his right lower leg on a plastic cover bold earlier today  The daughter is internal medicine doctor and was able to get the bleeding to stop  It is appears to be a slightly deep skin tear  He has very delicate skin he has not pain but any pain last tetanus was 6 months ago  Patient was given Augmentin prior arrival   At this time his skin is to thin and fragile to suture the wound be approximated with Steri-Strips prior Steri-Stripping the wound will be cleaned  He will get a prescription for cephalexin and be given an ambulatory referral to wound care  Patient's Coumadin is currently being held for last 2 days but the daughter  Allergies  -  - Metoprolol     Past Medical History:  No date:  Aortic valve stenosis  No date: AVD (aortic valve disease)  No date: Chest pain  No date: CHF (congestive heart failure) (HCC)  No date: Chronic obstructive lung disease (HCC)  No date: Dizziness  No date: Dyspepsia  No date: Fatigue  No date: Headache  No date: HLD (hyperlipidemia)  No date: HTN (hypertension)  No date: Lumbar radiculopathy  No date: Native stenosis of renal artery (HCC)  No date: Nephrolithiasis  No date: Precordial chest pain  No date: Respiratory abnormality  No date: Sleep apnea  No date: SOB (shortness of breath)  No date: Ventricular tachycardia ([facility] [address] )   Past Surgical History:  No date: APPENDECTOMY  No date: CARDIAC DEFIBRILLATOR PLACEMENT  No date: CARDIAC VALVE REPLACEMENT  No date: CATARACT EXTRACTION, BILATERAL  Social History    Tobacco Use      Smoking status: Former Smoker      Smokeless tobacco: Never Used    Alcohol use: Never    Drug use: Never      Nursing notes reviewed  Physical Exam:  ED Triage Vitals (09/11/21 2210)  Temperature: 98 1 °F (36 7 °C)  Pulse: (!) 48  Respirations: 18  Blood Pressure: (!) 174/74  SpO2: 98 %  Temp Source: Oral  Heart Rate Source: Monitor  Patient Position - Orthostatic VS: Sitting  BP Location: Left arm  FiO2 (%): n/a  Pain Score: n/a     The patient (and any family present) verbalized understanding of the discharge instructions and warnings that would necessitate return to the Emergency Department  All questions were answered prior to discharge  Prior to Admission Medications   Prescriptions Last Dose Informant Patient Reported? Taking?    ALPRAZolam (XANAX) 0 25 mg tablet   No No   Sig: Take 1 tablet (0 25 mg total) by mouth 2 (two) times a day as needed for anxiety   amiodarone 200 mg tablet  Self No No   Sig: TAKE ONE-HALF (1/2) TABLET DAILY   atorvastatin (LIPITOR) 20 mg tablet  Self No No   Sig: TAKE 1 TABLET AT BEDTIME   losartan (COZAAR) 50 mg tablet  Self Yes No   Sig: Take 50 mg by mouth 2 (two) times a day   pantoprazole (PROTONIX) 40 mg tablet  Self No No   Sig: Take 1 tablet (40 mg total) by mouth daily   pantoprazole (PROTONIX) 40 mg tablet  Self No No   Sig: TAKE 1 TABLET DAILY   spironolactone (ALDACTONE) 25 mg tablet  Self No No   Sig: TAKE 1 TABLET DAILY   tamsulosin (FLOMAX) 0 4 mg  Self Yes No   Sig: Take 0 4 mg by mouth daily with dinner   tiotropium-olodaterol (STIOLTO RESPIMAT) 2 5-2 5 MCG/ACT inhaler  Self Yes No   Sig: Inhale 2 puffs daily   warfarin (COUMADIN) 5 mg tablet  Self No No   Sig: TAKE 1 TABLET DAILY OR AS DIRECTED      Facility-Administered Medications: None       Past Medical History:   Diagnosis Date    Aortic valve stenosis     AVD (aortic valve disease)     Chest pain     CHF (congestive heart failure) (HCC)     Chronic obstructive lung disease (HCC)     Dizziness     Dyspepsia     Fatigue     Headache     HLD (hyperlipidemia)     HTN (hypertension)     Lumbar radiculopathy     Native stenosis of renal artery (HCC)     Nephrolithiasis     Precordial chest pain     Respiratory abnormality     Sleep apnea     SOB (shortness of breath)     Ventricular tachycardia (HCC)        Past Surgical History:   Procedure Laterality Date    APPENDECTOMY      CARDIAC DEFIBRILLATOR PLACEMENT      CARDIAC VALVE REPLACEMENT      CATARACT EXTRACTION, BILATERAL         Family History   Problem Relation Age of Onset    Heart disease Sister     Pneumonia Family      I have reviewed and agree with the history as documented  E-Cigarette/Vaping     E-Cigarette/Vaping Substances     Social History     Tobacco Use    Smoking status: Former Smoker    Smokeless tobacco: Never Used   Substance Use Topics    Alcohol use: Never    Drug use: Never       Review of Systems   Constitutional: Negative for diaphoresis, fatigue and fever  HENT: Negative for congestion, ear pain, nosebleeds and sore throat  Eyes: Negative for photophobia, pain, discharge and visual disturbance  Respiratory: Negative for cough, choking, chest tightness, shortness of breath and wheezing  Cardiovascular: Negative for chest pain and palpitations  Gastrointestinal: Negative for abdominal distention, abdominal pain, diarrhea and vomiting  Genitourinary: Negative for dysuria, flank pain and frequency  Musculoskeletal: Negative for back pain, gait problem and joint swelling  Skin: Positive for wound  Negative for color change and rash  Neurological: Negative for dizziness, syncope and headaches  Psychiatric/Behavioral: Negative for behavioral problems and confusion  The patient is not nervous/anxious  All other systems reviewed and are negative  Physical Exam  Physical Exam  Vitals and nursing note reviewed  Constitutional:       General: He is not in acute distress  Appearance: He is well-developed  He is not ill-appearing, toxic-appearing or diaphoretic  HENT:      Head: Normocephalic and atraumatic        Right Ear: External ear normal       Left Ear: External ear normal       Nose: Nose normal    Eyes:      Conjunctiva/sclera: Conjunctivae normal       Pupils: Pupils are equal, round, and reactive to light  Cardiovascular:      Rate and Rhythm: Normal rate and regular rhythm  Heart sounds: Murmur heard  Pulmonary:      Effort: Pulmonary effort is normal       Breath sounds: Normal breath sounds  Abdominal:      General: Bowel sounds are normal       Palpations: Abdomen is soft  Tenderness: There is no abdominal tenderness  Musculoskeletal:      Cervical back: Normal range of motion and neck supple  Legs:    Skin:     General: Skin is warm  Capillary Refill: Capillary refill takes less than 2 seconds  Neurological:      General: No focal deficit present  Mental Status: He is alert and oriented to person, place, and time  Psychiatric:         Behavior: Behavior normal          Vital Signs  ED Triage Vitals [09/11/21 2210]   Temperature Pulse Respirations Blood Pressure SpO2   98 1 °F (36 7 °C) (!) 48 18 (!) 174/74 98 %      Temp Source Heart Rate Source Patient Position - Orthostatic VS BP Location FiO2 (%)   Oral Monitor Sitting Left arm --      Pain Score       --           Vitals:    09/11/21 2210   BP: (!) 174/74   Pulse: (!) 48   Patient Position - Orthostatic VS: Sitting         Visual Acuity      ED Medications  Medications - No data to display    Diagnostic Studies  Results Reviewed     None                 No orders to display              Procedures  Laceration repair    Date/Time: 9/11/2021 10:53 PM  Performed by: Antonieta Locke PA-C  Authorized by: Antonieta Locke PA-C   Consent: Verbal consent obtained  Risks and benefits: risks, benefits and alternatives were discussed  Consent given by: patient  Patient understanding: patient states understanding of the procedure being performed  Patient identity confirmed: verbally with patient  Location: Right lower extremity    Laceration length: 3 cm  Tendon involvement: none  Nerve involvement: none      Procedure Details:  Preparation: Patient was prepped and draped in the usual sterile fashion  Irrigation solution: saline  Irrigation method: syringe  Amount of cleaning: standard  Debridement: none  Degree of undermining: none  Skin closure: Steri-Strips  Approximation: close  Approximation difficulty: simple  Dressing: 4x4 sterile gauze and gauze roll  Patient tolerance: patient tolerated the procedure well with no immediate complications               ED Course                                           MDM    Disposition  Final diagnoses:   Noninfected skin tear of right leg   Gait disturbance     Time reflects when diagnosis was documented in both MDM as applicable and the Disposition within this note     Time User Action Codes Description Comment    9/11/2021 10:52 PM Suzy Chew Add [E02 934D] Noninfected skin tear of right leg     9/11/2021 10:59 PM Isacc Feng Add [R26 9] Gait disturbance       ED Disposition     ED Disposition Condition Date/Time Comment    Discharge Stable Sat Sep 11, 2021 10:52 PM Emilia Lay discharge to home/self care              Follow-up Information     Follow up With Specialties Details Why Contact Info Additional Information    [facility]   [address] [place] [address],[address], [address]            Discharge Medication List as of 9/11/2021 10:59 PM      START taking these medications    Details   cephalexin (KEFLEX) 500 mg capsule Take 1 capsule (500 mg total) by mouth 4 (four) times a day for 7 days, Starting Sat 9/11/2021, Until Sat 9/18/2021, Normal         CONTINUE these medications which have NOT CHANGED    Details   ALPRAZolam (XANAX) 0 25 mg tablet Take 1 tablet (0 25 mg total) by mouth 2 (two) times a day as needed for anxiety, Starting Mon 7/26/2021, Normal amiodarone 200 mg tablet TAKE ONE-HALF (1/2) TABLET DAILY, Normal      atorvastatin (LIPITOR) 20 mg tablet TAKE 1 TABLET AT BEDTIME, Normal      losartan (COZAAR) 50 mg tablet Take 50 mg by mouth 2 (two) times a day, Historical Med      !! pantoprazole (PROTONIX) 40 mg tablet Take 1 tablet (40 mg total) by mouth daily, Starting Fri 11/23/2018, Normal      !! pantoprazole (PROTONIX) 40 mg tablet TAKE 1 TABLET DAILY, Normal      spironolactone (ALDACTONE) 25 mg tablet TAKE 1 TABLET DAILY, Normal      tamsulosin (FLOMAX) 0 4 mg Take 0 4 mg by mouth daily with dinner, Historical Med      tiotropium-olodaterol (STIOLTO RESPIMAT) 2 5-2 5 MCG/ACT inhaler Inhale 2 puffs daily, Historical Med      warfarin (COUMADIN) 5 mg tablet TAKE 1 TABLET DAILY OR AS DIRECTED, Normal       !! - Potential duplicate medications found  Please discuss with provider              PDMP Review     None          ED Provider  Electronically Signed by           Dandre Lozano PA-C  09/13/21 [id number]

## 2021-09-21 ENCOUNTER — HOSPITAL ENCOUNTER (OUTPATIENT)
Dept: NON INVASIVE DIAGNOSTICS | Facility: CLINIC | Age: 81
Discharge: HOME | End: 2021-09-21
Payer: MEDICARE

## 2021-09-21 ENCOUNTER — OFFICE VISIT (OUTPATIENT)
Dept: WOUND CARE | Facility: CLINIC | Age: 81
End: 2021-09-21
Payer: MEDICARE

## 2021-09-21 VITALS
SYSTOLIC BLOOD PRESSURE: 140 MMHG | HEART RATE: 70 BPM | DIASTOLIC BLOOD PRESSURE: 78 MMHG | WEIGHT: 176 LBS | HEIGHT: 71 IN | RESPIRATION RATE: 18 BRPM | TEMPERATURE: 96.9 F | BODY MASS INDEX: 24.64 KG/M2

## 2021-09-21 DIAGNOSIS — I73.9 PAD (PERIPHERAL ARTERY DISEASE) (HCC): ICD-10-CM

## 2021-09-21 DIAGNOSIS — R06.02 SHORTNESS OF BREATH: ICD-10-CM

## 2021-09-21 DIAGNOSIS — S81.801A OPEN WOUND OF RIGHT LOWER LEG, INITIAL ENCOUNTER: Primary | ICD-10-CM

## 2021-09-21 PROCEDURE — G1004 CDSM NDSC: HCPCS

## 2021-09-21 PROCEDURE — 93017 CV STRESS TEST TRACING ONLY: CPT

## 2021-09-21 PROCEDURE — 78452 HT MUSCLE IMAGE SPECT MULT: CPT

## 2021-09-21 PROCEDURE — 99203 OFFICE O/P NEW LOW 30 MIN: CPT | Performed by: FAMILY MEDICINE

## 2021-09-21 PROCEDURE — 11042 DBRDMT SUBQ TIS 1ST 20SQCM/<: CPT | Performed by: FAMILY MEDICINE

## 2021-09-21 PROCEDURE — 93016 CV STRESS TEST SUPVJ ONLY: CPT | Performed by: INTERNAL MEDICINE

## 2021-09-21 PROCEDURE — 99213 OFFICE O/P EST LOW 20 MIN: CPT | Performed by: FAMILY MEDICINE

## 2021-09-21 PROCEDURE — A9502 TC99M TETROFOSMIN: HCPCS

## 2021-09-21 PROCEDURE — 93018 CV STRESS TEST I&R ONLY: CPT | Performed by: INTERNAL MEDICINE

## 2021-09-21 PROCEDURE — 78452 HT MUSCLE IMAGE SPECT MULT: CPT | Performed by: INTERNAL MEDICINE

## 2021-09-21 RX ORDER — LIDOCAINE HYDROCHLORIDE 40 MG/ML
5 SOLUTION TOPICAL ONCE
Status: COMPLETED | OUTPATIENT
Start: 2021-09-21 | End: 2021-09-21

## 2021-09-21 RX ADMIN — REGADENOSON 0.4 MG: 0.08 INJECTION, SOLUTION INTRAVENOUS at 13:26

## 2021-09-21 RX ADMIN — LIDOCAINE HYDROCHLORIDE 5 ML: 40 SOLUTION TOPICAL at 15:22

## 2021-09-21 NOTE — ASSESSMENT & PLAN NOTE
Initial evaluation   Debrided as below  Wound management with medihoney, see wound orders below   Light compression with Tubigrip   elevate legs as tolerated   Pulses obtained via Doppler, arterial studies ordered today   Ok to shower, no harsh cleansers such as alcohol, peroxide, or antibacterial soap   followup in 1 week or call sooner with questions or concerns

## 2021-09-21 NOTE — PROGRESS NOTES
Patient ID: Keith Negron is a 79 y o  male Date of Birth 1940     Chief Complaint  Chief Complaint   Patient presents with    New Patient Visit     RLE wound       Allergies  Metoprolol    Assessment:    Open wound of right lower leg   Initial evaluation   Debrided as below  Wound management with medihoney, see wound orders below   Light compression with Tubigrip   elevate legs as tolerated   Pulses obtained via Doppler, arterial studies ordered today   Ok to shower, no harsh cleansers such as alcohol, peroxide, or antibacterial soap   followup in 1 week or call sooner with questions or concerns       Diagnoses and all orders for this visit:    Open wound of right lower leg, initial encounter  -     lidocaine (XYLOCAINE) 4 % topical solution 5 mL  -     Wound cleansing and dressings; Future  -     Wound compression and edema control; Future  -     VAS lower limb arterial duplex, complete bilateral; Future  -     Debridement    PAD (peripheral artery disease) (HCC)  -     VAS lower limb arterial duplex, complete bilateral; Future              Debridement   Wound 09/21/21 Traumatic Pretibial Right    Universal Protocol:  Consent: Verbal consent obtained  Consent given by: patient  Time out: Immediately prior to procedure a "time out" was called to verify the correct patient, procedure, equipment, support staff and site/side marked as required  Timeout called at: 9/21/2021 3:20 PM   Patient understanding: patient states understanding of the procedure being performed  Patient identity confirmed: verbally with patient      Performed by: physician  Debridement type: surgical  Level of debridement: subcutaneous tissue  Pain control: lidocaine 4%  Post-debridement measurements  Length (cm): 2 5  Width (cm): 2  Depth (cm): 0 2  Percent debrided: 75%  Surface Area (cm^2): 5  Area debrided (cm^2): 3 75  Volume (cm^3):  1  Tissue and other material debrided: subcutaneous tissue  Devitalized tissue debrided: exudate and slough  Instrument(s) utilized: curette, forceps and scissors  Bleeding: small  Hemostasis obtained with: pressure  Procedural pain (0-10): 0  Post-procedural pain: 0   Response to treatment: procedure was tolerated well          Plan:     Wound 09/21/21 Traumatic Pretibial Right (Active)   Wound Image Images linked 09/21/21 1540   Wound Description Pink 09/21/21 1522   Erica-wound Assessment Fragile;Pink 09/21/21 1522   Wound Length (cm) 2 5 cm 09/21/21 1522   Wound Width (cm) 2 cm 09/21/21 1522   Wound Depth (cm) 0 1 cm 09/21/21 1522   Wound Surface Area (cm^2) 5 cm^2 09/21/21 1522   Wound Volume (cm^3) 0 5 cm^3 09/21/21 1522   Calculated Wound Volume (cm^3) 0 5 cm^3 09/21/21 1522   Drainage Amount Moderate 09/21/21 1522   Drainage Description Serosanguineous 09/21/21 1522   Non-staged Wound Description Full thickness 09/21/21 1522   Dressing Status Intact 09/21/21 1522       Wound 09/21/21 Traumatic Pretibial Right (Active)   Date First Assessed/Time First Assessed: 09/21/21 1516   Pre-Existing Wound: Yes  Primary Wound Type: Traumatic  Location: Pretibial  Wound Location Orientation: Right       Subjective:        Patient presents for initial evaluation of a traumatic wound after scraping his leg on 09/11  Patient was seen in the ED and Steri-Strips were placed  Patient's daughter who is an internal medicine doctor reports that xeroform has been used topically over the Steri-Strips as well as Telfa  No diabetes  No smoking  Daughter reports that patient does have a history PAD    Daughter reports that patient is currently completing a course of Augmentin      The following portions of the patient's history were reviewed and updated as appropriate: He  has a past medical history of Aortic valve stenosis, AVD (aortic valve disease), Chest pain, CHF (congestive heart failure) ([address] ), Chronic obstructive lung disease ([facility] [address] ), Dizziness, Dyspepsia, Fatigue, Headache, HLD (hyperlipidemia), HTN (hypertension), Lumbar radiculopathy, Native stenosis of renal artery ([address] ), Nephrolithiasis, Precordial chest pain, Respiratory abnormality, Sleep apnea, SOB (shortness of breath), and Ventricular tachycardia ([address] )  He  reports that he has quit smoking  He has never used smokeless tobacco  He reports that he does not drink alcohol and does not use drugs  He is allergic to metoprolol       Review of Systems   Constitutional: Negative for chills and fever  HENT: Negative for congestion and sneezing  Respiratory: Negative for cough  Cardiovascular: Positive for leg swelling  Skin: Positive for wound  Psychiatric/Behavioral: Negative for agitation  Objective:       Wound 09/21/21 Traumatic Pretibial Right (Active)   Wound Image Images linked 09/21/21 1540   Wound Description Pink 09/21/21 1522   Erica-wound Assessment Fragile;Pink 09/21/21 1522   Wound Length (cm) 2 5 cm 09/21/21 1522   Wound Width (cm) 2 cm 09/21/21 1522   Wound Depth (cm) 0 1 cm 09/21/21 1522   Wound Surface Area (cm^2) 5 cm^2 09/21/21 1522   Wound Volume (cm^3) 0 5 cm^3 09/21/21 1522   Calculated Wound Volume (cm^3) 0 5 cm^3 09/21/21 1522   Drainage Amount Moderate 09/21/21 1522   Drainage Description Serosanguineous 09/21/21 1522   Non-staged Wound Description Full thickness 09/21/21 1522   Dressing Status Intact 09/21/21 1522       /78   Pulse 70   Temp (!) 96 9 °F (36 1 °C)   Resp 18   Ht 5' 11" (1 803 m)   Wt 79 8 kg (176 lb)   BMI 24 55 kg/m²     Physical Exam  Constitutional:       General: He is not in acute distress  Appearance: Normal appearance  He is not ill-appearing, toxic-appearing or diaphoretic  HENT:      Head: Normocephalic and atraumatic  Right Ear: External ear normal       Left Ear: External ear normal    Eyes:      Conjunctiva/sclera: Conjunctivae normal    Cardiovascular:      Pulses:           Dorsalis pedis pulses are detected w/ Doppler on the right side          Posterior tibial pulses are detected w/ Doppler on the right side  Pulmonary:      Effort: Pulmonary effort is normal  No respiratory distress  Musculoskeletal:      Cervical back: Neck supple  Right lower leg: Edema present  Skin:     Comments: See wound assessment   Neurological:      Mental Status: He is alert  Psychiatric:         Mood and Affect: Mood normal          Behavior: Behavior normal            Wound 09/21/21 Traumatic Pretibial Right (Active)   Wound Image   09/21/21 1540   Wound Description Pink 09/21/21 1522   Erica-wound Assessment Fragile;Pink 09/21/21 1522   Wound Length (cm) 2 5 cm 09/21/21 1522   Wound Width (cm) 2 cm 09/21/21 1522   Wound Depth (cm) 0 1 cm 09/21/21 1522   Wound Surface Area (cm^2) 5 cm^2 09/21/21 1522   Wound Volume (cm^3) 0 5 cm^3 09/21/21 1522   Calculated Wound Volume (cm^3) 0 5 cm^3 09/21/21 1522   Drainage Amount Moderate 09/21/21 1522   Drainage Description Serosanguineous 09/21/21 1522   Non-staged Wound Description Full thickness 09/21/21 1522   Dressing Status Intact 09/21/21 1522                       Wound Instructions:  Orders Placed This Encounter   Procedures    Wound cleansing and dressings     Right lower leg wound:    Wash your hands with soap and water  Remove old dressing, discard into plastic bag and place in trash  Cleanse the wound with normal saline solution or mild unscented dove soap and water prior to applying a clean dressing  Do not use tissue or cotton balls  Do not scrub the wound  Pat dry using gauze  Ok to shower if you feel comfortable and are steady  Do not submerge in water (pool, hot tub, ocean etc)    Apply medihoney to the wound  Cover with gauze or ABD, rolled gauze and secure with tape  Change dressing daily    This was done today at the wound care center   See back in 1 week     Standing Status:   Future     Standing Expiration Date:   9/21/2022    Wound compression and edema control     Elastic Tubular Stocking: Spandagrip F    Tubular elastic bandage: Apply from base of toes to behind the knee  Apply in AM, may remove for sleep  Avoid prolonged standing in one place  Elevate leg(s) above the level of the heart when sitting or as much as possible  Standing Status:   Future     Standing Expiration Date:   9/21/2022    Debridement     This order was created via procedure documentation        Diagnosis ICD-10-CM Associated Orders   1  Open wound of right lower leg, initial encounter  S81 801A lidocaine (XYLOCAINE) 4 % topical solution 5 mL     Wound cleansing and dressings     Wound compression and edema control     VAS lower limb arterial duplex, complete bilateral     Debridement   2   PAD (peripheral artery disease) ([facility])  I73 9 VAS lower limb arterial duplex, complete bilateral

## 2021-09-21 NOTE — PATIENT INSTRUCTIONS
Orders Placed This Encounter   Procedures    Wound cleansing and dressings     Right lower leg wound:    Wash your hands with soap and water  Remove old dressing, discard into plastic bag and place in trash  Cleanse the wound with normal saline solution or mild unscented dove soap and water prior to applying a clean dressing  Do not use tissue or cotton balls  Do not scrub the wound  Pat dry using gauze  Ok to shower if you feel comfortable and are steady  Do not submerge in water (pool, hot tub, ocean etc)    Apply medihoney to the wound  Cover with gauze or ABD, rolled gauze and secure with tape  Change dressing daily    This was done today at the wound care center  See back in 1 week     Standing Status:   Future     Standing Expiration Date:   9/21/2022    Wound compression and edema control     Elastic Tubular Stocking: Spandagrip F    Tubular elastic bandage: Apply from base of toes to behind the knee  Apply in AM, may remove for sleep  Avoid prolonged standing in one place  Elevate leg(s) above the level of the heart when sitting or as much as possible       Standing Status:   Future     Standing Expiration Date:   9/21/2022

## 2021-09-22 ENCOUNTER — HOSPITAL ENCOUNTER (OUTPATIENT)
Dept: VASCULAR ULTRASOUND | Facility: HOSPITAL | Age: 81
Discharge: HOME | End: 2021-09-22
Attending: FAMILY MEDICINE
Payer: MEDICARE

## 2021-09-22 DIAGNOSIS — S81.801A OPEN WOUND OF RIGHT LOWER LEG, INITIAL ENCOUNTER: ICD-10-CM

## 2021-09-22 DIAGNOSIS — I73.9 PAD (PERIPHERAL ARTERY DISEASE) (HCC): ICD-10-CM

## 2021-09-22 PROCEDURE — 93925 LOWER EXTREMITY STUDY: CPT

## 2021-09-22 PROCEDURE — 93923 UPR/LXTR ART STDY 3+ LVLS: CPT

## 2021-09-23 LAB
ARRHY DURING EX: NORMAL
CHEST PAIN STATEMENT: NORMAL
MAX DIASTOLIC BP: 60 MMHG
MAX HEART RATE: 57 BPM
MAX PREDICTED HEART RATE: 138 BPM
MAX. SYSTOLIC BP: 130 MMHG
PROTOCOL NAME: NORMAL
REASON FOR TERMINATION: NORMAL
TARGET HR FORMULA: NORMAL
TEST INDICATION: NORMAL
TIME IN EXERCISE PHASE: NORMAL

## 2021-09-23 PROCEDURE — 93925 LOWER EXTREMITY STUDY: CPT | Performed by: SURGERY

## 2021-09-23 PROCEDURE — 93922 UPR/L XTREMITY ART 2 LEVELS: CPT | Performed by: SURGERY

## 2021-09-28 ENCOUNTER — OFFICE VISIT (OUTPATIENT)
Dept: WOUND CARE | Facility: CLINIC | Age: 81
End: 2021-09-28
Payer: MEDICARE

## 2021-09-28 VITALS — TEMPERATURE: 96.4 F | RESPIRATION RATE: 20 BRPM

## 2021-09-28 DIAGNOSIS — S81.801A OPEN WOUND OF RIGHT LOWER LEG, INITIAL ENCOUNTER: Primary | ICD-10-CM

## 2021-09-28 DIAGNOSIS — I73.9 PAD (PERIPHERAL ARTERY DISEASE) (HCC): ICD-10-CM

## 2021-09-28 PROCEDURE — 99213 OFFICE O/P EST LOW 20 MIN: CPT | Performed by: FAMILY MEDICINE

## 2021-09-28 PROCEDURE — 11042 DBRDMT SUBQ TIS 1ST 20SQCM/<: CPT | Performed by: FAMILY MEDICINE

## 2021-09-28 RX ORDER — LIDOCAINE HYDROCHLORIDE 40 MG/ML
5 SOLUTION TOPICAL ONCE
Status: COMPLETED | OUTPATIENT
Start: 2021-09-28 | End: 2021-09-28

## 2021-09-28 RX ADMIN — LIDOCAINE HYDROCHLORIDE 5 ML: 40 SOLUTION TOPICAL at 15:11

## 2021-09-28 NOTE — PATIENT INSTRUCTIONS
Orders Placed This Encounter   Procedures    Wound cleansing and dressings     Right lower leg wound:     Wash your hands with soap and water  Remove old dressing, discard into plastic bag and place in trash  Cleanse the wound with normal saline solution or mild unscented dove soap and water prior to applying a clean dressing  Do not use tissue or cotton balls  Do not scrub the wound  Pat dry using gauze      Ok to shower if you feel comfortable and are steady  Do not submerge in water (pool, hot tub, ocean etc)     Apply medihoney to the wound  Cover with gauze or ABD, rolled gauze and secure with tape  Change dressing daily     This was done today at the wound care center  See back in 1 week            Standing Status:   Future     Standing Expiration Date:   9/28/2022    Wound compression and edema control     Elastic Tubular Stocking: Spandagrip F     Tubular elastic bandage: Apply from base of toes to behind the knee   Apply in AM, may remove for sleep      Avoid prolonged standing in one place     Standing Status:   Future     Standing Expiration Date:   9/28/2022

## 2021-09-28 NOTE — PROGRESS NOTES
Patient ID: Estella Enriquez is a 79 y o  male Date of Birth 1940     Chief Complaint  Chief Complaint   Patient presents with    Follow Up Wound Care Visit     Right leg wound       Allergies  Metoprolol    Assessment:    Open wound of right lower leg    Wound is improved   Debrided as below   Continue wound management with medihoney  Continue compression with Tubigrip   Arterial studies reviewed today with patient, his wife, and his daughter  Referral placed a vascular surgery today   followup in 1 week or call sooner questions or concerns       Diagnoses and all orders for this visit:    Open wound of right lower leg, initial encounter  -     lidocaine (XYLOCAINE) 4 % topical solution 5 mL  -     Wound cleansing and dressings; Future  -     Wound compression and edema control; Future  -     Debridement    PAD (peripheral artery disease) ([facility] [address] )  -     Ambulatory referral to Vascular Surgery; Future              Debridement   Wound 09/21/21 Traumatic Pretibial Right    Universal Protocol:  Consent: Verbal consent obtained  Consent given by: patient  Time out: Immediately prior to procedure a "time out" was called to verify the correct patient, procedure, equipment, support staff and site/side marked as required    Timeout called at: 9/28/2021 3:15 PM   Patient understanding: patient states understanding of the procedure being performed  Patient identity confirmed: verbally with patient      Performed by: physician  Debridement type: surgical  Level of debridement: subcutaneous tissue  Pain control: lidocaine 4%  Post-debridement measurements  Length (cm): 3 4  Width (cm): 1 4  Depth (cm): 0 4  Percent debrided: 100%  Surface Area (cm^2): 4 76  Area debrided (cm^2): 4 76  Volume (cm^3): 1 9  Tissue and other material debrided: subcutaneous tissue  Devitalized tissue debrided: exudate and slough  Instrument(s) utilized: curette  Bleeding: small  Hemostasis obtained with: pressure  Procedural pain (0-10): 0  Post-procedural pain: 0   Response to treatment: procedure was tolerated well          Plan:     Wound 09/21/21 Traumatic Pretibial Right (Active)   Wound Image Images linked 09/28/21 1518   Wound Description Pink;Eschar;Yellow 09/28/21 1509   Erica-wound Assessment Fragile;[place] 09/28/21 1509   Wound Length (cm) 3 4 cm 09/28/21 1509   Wound Width (cm) 1 4 cm 09/28/21 1509   Wound Depth (cm) 0 3 cm 09/28/21 1509   Wound Surface Area (cm^2) 4 76 cm^2 09/28/21 1509   Wound Volume (cm^3) 1 428 cm^3 09/28/21 1509   Calculated Wound Volume (cm^3) 1 43 cm^3 09/28/21 1509   Change in Wound Size % -186 09/28/21 1509   Drainage Amount Small 09/28/21 1509   Drainage Description Serosanguineous 09/28/21 1509   Non-staged Wound Description Full thickness 09/28/21 1509   Dressing Status Intact 09/28/21 1509       Wound 09/21/21 Traumatic Pretibial Right (Active)   Date First Assessed/Time First Assessed: 09/21/21 1516   Pre-Existing Wound: Yes  Primary Wound Type: Traumatic  Location: Pretibial  Wound Location Orientation: Right       Subjective:        Patient presents for followup right lower extremity  Has been using medihoney on the wound and Tubigrip for compression  No increased pain or drainage  No new complaints  The following portions of the patient's history were reviewed and updated as appropriate: He  has a past medical history of Aortic valve stenosis, AVD (aortic valve disease), Chest pain, CHF (congestive heart failure) ([address] ), Chronic obstructive lung disease ([address] ), Dizziness, Dyspepsia, Fatigue, Headache, HLD (hyperlipidemia), HTN (hypertension), Lumbar radiculopathy, Native stenosis of renal artery ([address] ), Nephrolithiasis, Precordial chest pain, Respiratory abnormality, Sleep apnea, SOB (shortness of breath), and Ventricular tachycardia ([address] )  He  reports that he has quit smoking  He has never used smokeless tobacco  He reports that he does not drink alcohol and does not use drugs    He is allergic to metoprolol       Review of Systems   Constitutional: Negative for chills and fever  HENT: Negative for congestion and sneezing  Respiratory: Negative for cough  Skin: Positive for wound  Psychiatric/Behavioral: Negative for agitation  Objective:       Wound 09/21/21 Traumatic Pretibial Right (Active)   Wound Image Images linked 09/28/21 1518   Wound Description Pink;Eschar;Yellow 09/28/21 1509   Erica-wound Assessment Fragile;House 09/28/21 1509   Wound Length (cm) 3 4 cm 09/28/21 1509   Wound Width (cm) 1 4 cm 09/28/21 1509   Wound Depth (cm) 0 3 cm 09/28/21 1509   Wound Surface Area (cm^2) 4 76 cm^2 09/28/21 1509   Wound Volume (cm^3) 1 428 cm^3 09/28/21 1509   Calculated Wound Volume (cm^3) 1 43 cm^3 09/28/21 1509   Change in Wound Size % -186 09/28/21 1509   Drainage Amount Small 09/28/21 1509   Drainage Description Serosanguineous 09/28/21 1509   Non-staged Wound Description Full thickness 09/28/21 1509   Dressing Status Intact 09/28/21 1509       Temp (!) 96 4 °F (35 8 °C)   Resp 20     Physical Exam        Wound 09/21/21 Traumatic Pretibial Right (Active)   Wound Image   09/28/21 1518   Wound Description Pink;Eschar;Yellow 09/28/21 1509   Erica-wound Assessment Fragile;House 09/28/21 1509   Wound Length (cm) 3 4 cm 09/28/21 1509   Wound Width (cm) 1 4 cm 09/28/21 1509   Wound Depth (cm) 0 3 cm 09/28/21 1509   Wound Surface Area (cm^2) 4 76 cm^2 09/28/21 1509   Wound Volume (cm^3) 1 428 cm^3 09/28/21 1509   Calculated Wound Volume (cm^3) 1 43 cm^3 09/28/21 1509   Change in Wound Size % -186 09/28/21 1509   Drainage Amount Small 09/28/21 1509   Drainage Description Serosanguineous 09/28/21 1509   Non-staged Wound Description Full thickness 09/28/21 1509   Dressing Status Intact 09/28/21 1509                       Wound Instructions:  Orders Placed This Encounter   Procedures    Wound cleansing and dressings     Right lower leg wound:     Wash your hands with soap and water    Remove old dressing, discard into plastic bag and place in trash  Cleanse the wound with normal saline solution or mild unscented dove soap and water prior to applying a clean dressing  Do not use tissue or cotton balls  Do not scrub the wound  Pat dry using gauze      Ok to shower if you feel comfortable and are steady  Do not submerge in water (pool, hot tub, ocean etc)     Apply medihoney to the wound  Cover with gauze or ABD, rolled gauze and secure with tape  Change dressing daily     This was done today at the wound care center  See back in 1 week            Standing Status:   Future     Standing Expiration Date:   9/28/2022    Wound compression and edema control     Elastic Tubular Stocking: Spandagrip F     Tubular elastic bandage: Apply from base of toes to behind the knee  Apply in AM, may remove for sleep      Avoid prolonged standing in one place     Standing Status:   Future     Standing Expiration Date:   9/28/2022    Debridement     This order was created via procedure documentation    Ambulatory referral to Vascular Surgery     Standing Status:   Future     Standing Expiration Date:   9/28/2022     Referral Priority:   Routine     Referral Type:   Consult - AMB     Referral Reason:   Specialty Services Required     Requested Specialty:   Vascular Surgery     Number of Visits Requested:   1     Expiration Date:   9/28/2022        Diagnosis ICD-10-CM Associated Orders   1  Open wound of right lower leg, initial encounter  S81 801A lidocaine (XYLOCAINE) 4 % topical solution 5 mL     Wound cleansing and dressings     Wound compression and edema control     Debridement   2   PAD (peripheral artery disease) ([facility])  I73 9 Ambulatory referral to Vascular Surgery

## 2021-09-28 NOTE — ASSESSMENT & PLAN NOTE
Wound is improved   Debrided as below   Continue wound management with medihoney  Continue compression with Tubigrip   Arterial studies reviewed today with patient, his wife, and his daughter    Referral placed a vascular surgery today   followup in 1 week or call sooner questions or concerns

## 2021-10-04 ENCOUNTER — TELEPHONE (OUTPATIENT)
Dept: ADMINISTRATIVE | Facility: HOSPITAL | Age: 81
End: 2021-10-04

## 2021-10-04 ENCOUNTER — CONSULT (OUTPATIENT)
Dept: VASCULAR SURGERY | Facility: CLINIC | Age: 81
End: 2021-10-04
Payer: MEDICARE

## 2021-10-04 VITALS
WEIGHT: 182 LBS | DIASTOLIC BLOOD PRESSURE: 70 MMHG | HEART RATE: 50 BPM | SYSTOLIC BLOOD PRESSURE: 146 MMHG | BODY MASS INDEX: 25.48 KG/M2 | TEMPERATURE: 97.8 F | HEIGHT: 71 IN

## 2021-10-04 DIAGNOSIS — I73.9 PAD (PERIPHERAL ARTERY DISEASE) (HCC): ICD-10-CM

## 2021-10-04 PROCEDURE — 99204 OFFICE O/P NEW MOD 45 MIN: CPT | Performed by: SURGERY

## 2021-10-05 ENCOUNTER — OFFICE VISIT (OUTPATIENT)
Dept: WOUND CARE | Facility: CLINIC | Age: 81
End: 2021-10-05
Payer: MEDICARE

## 2021-10-05 VITALS
DIASTOLIC BLOOD PRESSURE: 73 MMHG | SYSTOLIC BLOOD PRESSURE: 173 MMHG | RESPIRATION RATE: 20 BRPM | TEMPERATURE: 97.6 F | HEART RATE: 49 BPM

## 2021-10-05 DIAGNOSIS — S81.801A OPEN WOUND OF RIGHT LOWER LEG, INITIAL ENCOUNTER: Primary | ICD-10-CM

## 2021-10-05 PROCEDURE — 87070 CULTURE OTHR SPECIMN AEROBIC: CPT | Performed by: FAMILY MEDICINE

## 2021-10-05 PROCEDURE — 99214 OFFICE O/P EST MOD 30 MIN: CPT | Performed by: FAMILY MEDICINE

## 2021-10-05 PROCEDURE — 11042 DBRDMT SUBQ TIS 1ST 20SQCM/<: CPT | Performed by: FAMILY MEDICINE

## 2021-10-05 PROCEDURE — 87205 SMEAR GRAM STAIN: CPT | Performed by: FAMILY MEDICINE

## 2021-10-05 RX ORDER — LIDOCAINE HYDROCHLORIDE 40 MG/ML
5 SOLUTION TOPICAL ONCE
Status: COMPLETED | OUTPATIENT
Start: 2021-10-05 | End: 2021-10-05

## 2021-10-05 RX ORDER — SODIUM HYPOCHLORITE 2.5 MG/ML
1 SOLUTION TOPICAL EVERY OTHER DAY
Qty: 473 ML | Refills: 0 | Status: SHIPPED | OUTPATIENT
Start: 2021-10-05 | End: 2021-10-05 | Stop reason: SDUPTHER

## 2021-10-05 RX ORDER — SODIUM HYPOCHLORITE 2.5 MG/ML
1 SOLUTION TOPICAL EVERY OTHER DAY
Qty: 473 ML | Refills: 0 | Status: SHIPPED | OUTPATIENT
Start: 2021-10-05

## 2021-10-05 RX ORDER — CEPHALEXIN 500 MG/1
500 CAPSULE ORAL EVERY 6 HOURS SCHEDULED
Qty: 28 CAPSULE | Refills: 0 | Status: SHIPPED | OUTPATIENT
Start: 2021-10-05 | End: 2021-10-12

## 2021-10-05 RX ORDER — CEPHALEXIN 500 MG/1
500 CAPSULE ORAL EVERY 6 HOURS SCHEDULED
Qty: 28 CAPSULE | Refills: 0 | Status: SHIPPED | OUTPATIENT
Start: 2021-10-05 | End: 2021-10-05 | Stop reason: SDUPTHER

## 2021-10-05 RX ADMIN — LIDOCAINE HYDROCHLORIDE 5 ML: 40 SOLUTION TOPICAL at 14:58

## 2021-10-08 ENCOUNTER — TELEPHONE (OUTPATIENT)
Dept: WOUND CARE | Facility: CLINIC | Age: 81
End: 2021-10-08

## 2021-10-08 LAB
BACTERIA WND AEROBE CULT: ABNORMAL
GRAM STN SPEC: ABNORMAL

## 2021-10-22 ENCOUNTER — OFFICE VISIT (OUTPATIENT)
Dept: WOUND CARE | Facility: CLINIC | Age: 81
End: 2021-10-22
Payer: MEDICARE

## 2021-10-22 VITALS
SYSTOLIC BLOOD PRESSURE: 149 MMHG | HEART RATE: 49 BPM | RESPIRATION RATE: 18 BRPM | TEMPERATURE: 97.3 F | DIASTOLIC BLOOD PRESSURE: 67 MMHG

## 2021-10-22 DIAGNOSIS — I73.9 PAD (PERIPHERAL ARTERY DISEASE) (HCC): ICD-10-CM

## 2021-10-22 DIAGNOSIS — S81.801A OPEN WOUND OF RIGHT LOWER LEG, INITIAL ENCOUNTER: Primary | ICD-10-CM

## 2021-10-22 PROCEDURE — 97597 DBRDMT OPN WND 1ST 20 CM/<: CPT | Performed by: PREVENTIVE MEDICINE

## 2021-10-22 RX ORDER — LIDOCAINE HYDROCHLORIDE 40 MG/ML
5 SOLUTION TOPICAL ONCE
Status: COMPLETED | OUTPATIENT
Start: 2021-10-22 | End: 2021-10-22

## 2021-10-22 RX ADMIN — LIDOCAINE HYDROCHLORIDE 5 ML: 40 SOLUTION TOPICAL at 14:31

## 2021-10-29 ENCOUNTER — OFFICE VISIT (OUTPATIENT)
Dept: WOUND CARE | Facility: CLINIC | Age: 81
End: 2021-10-29
Payer: MEDICARE

## 2021-10-29 VITALS
SYSTOLIC BLOOD PRESSURE: 170 MMHG | RESPIRATION RATE: 20 BRPM | HEART RATE: 77 BPM | DIASTOLIC BLOOD PRESSURE: 73 MMHG | TEMPERATURE: 97.6 F

## 2021-10-29 DIAGNOSIS — S81.801A OPEN WOUND OF RIGHT LOWER LEG, INITIAL ENCOUNTER: Primary | ICD-10-CM

## 2021-10-29 DIAGNOSIS — I73.9 PAD (PERIPHERAL ARTERY DISEASE) (HCC): ICD-10-CM

## 2021-10-29 PROCEDURE — 11042 DBRDMT SUBQ TIS 1ST 20SQCM/<: CPT | Performed by: PREVENTIVE MEDICINE

## 2021-10-29 RX ORDER — LIDOCAINE HYDROCHLORIDE 40 MG/ML
5 SOLUTION TOPICAL ONCE
Status: COMPLETED | OUTPATIENT
Start: 2021-10-29 | End: 2021-10-29

## 2021-10-29 RX ADMIN — LIDOCAINE HYDROCHLORIDE 5 ML: 40 SOLUTION TOPICAL at 13:15

## 2021-11-16 ENCOUNTER — NON-APPOINTMENT (OUTPATIENT)
Age: 81
End: 2021-11-16

## 2021-11-16 ENCOUNTER — APPOINTMENT (OUTPATIENT)
Dept: HEART AND VASCULAR | Facility: CLINIC | Age: 81
End: 2021-11-16
Payer: OTHER MISCELLANEOUS

## 2021-11-16 PROCEDURE — 93295 DEV INTERROG REMOTE 1/2/MLT: CPT | Mod: NC

## 2021-11-16 PROCEDURE — 93296 REM INTERROG EVL PM/IDS: CPT

## 2021-12-21 ENCOUNTER — TELEPHONE (OUTPATIENT)
Dept: INTERNAL MEDICINE CLINIC | Facility: CLINIC | Age: 81
End: 2021-12-21

## 2021-12-21 NOTE — TELEPHONE ENCOUNTER
Form for [facility] case#: [id number]  Notice of proposed conciliation decision  Was mailed to [address] [place] , [place], [place], [address]- today   Per Dr Preston Melo, pt hasn't been seen since 2016    P#: [phone]

## 2022-03-29 ENCOUNTER — APPOINTMENT (OUTPATIENT)
Dept: HEART AND VASCULAR | Facility: CLINIC | Age: 82
End: 2022-03-29
Payer: MEDICARE

## 2022-03-29 VITALS
HEIGHT: 69 IN | BODY MASS INDEX: 26.66 KG/M2 | WEIGHT: 180 LBS | HEART RATE: 50 BPM | DIASTOLIC BLOOD PRESSURE: 62 MMHG | SYSTOLIC BLOOD PRESSURE: 129 MMHG

## 2022-03-29 DIAGNOSIS — R00.1 BRADYCARDIA, UNSPECIFIED: ICD-10-CM

## 2022-03-29 PROCEDURE — 93282 PRGRMG EVAL IMPLANTABLE DFB: CPT

## 2022-03-29 PROCEDURE — 99213 OFFICE O/P EST LOW 20 MIN: CPT | Mod: 25

## 2022-03-29 RX ORDER — TIMOLOL MALEATE 5 MG/ML
0.5 SOLUTION OPHTHALMIC
Qty: 15 | Refills: 0 | Status: COMPLETED | COMMUNITY
Start: 2021-12-23

## 2022-03-29 RX ORDER — LOSARTAN POTASSIUM 100 MG/1
100 TABLET, FILM COATED ORAL
Qty: 90 | Refills: 0 | Status: COMPLETED | COMMUNITY
Start: 2021-12-23

## 2022-03-29 RX ORDER — SODIUM HYPOCHLORITE 2.5 MG/ML
0.25 SOLUTION TOPICAL
Qty: 473 | Refills: 0 | Status: COMPLETED | COMMUNITY
Start: 2021-10-05

## 2022-03-29 RX ORDER — FLUTICASONE PROPIONATE 50 UG/1
50 SPRAY, METERED NASAL
Qty: 16 | Refills: 0 | Status: COMPLETED | COMMUNITY
Start: 2022-01-28

## 2022-03-29 RX ORDER — OXYCODONE AND ACETAMINOPHEN 10; 325 MG/1; MG/1
10-325 TABLET ORAL
Qty: 30 | Refills: 0 | Status: COMPLETED | COMMUNITY
Start: 2021-12-22

## 2022-03-29 RX ORDER — CEPHALEXIN 500 MG/1
500 CAPSULE ORAL
Qty: 28 | Refills: 0 | Status: COMPLETED | COMMUNITY
Start: 2021-10-05

## 2022-03-29 RX ORDER — TAMSULOSIN HYDROCHLORIDE 0.4 MG/1
0.4 CAPSULE ORAL
Qty: 90 | Refills: 0 | Status: COMPLETED | COMMUNITY
Start: 2021-03-30

## 2022-03-29 NOTE — PHYSICAL EXAM
[General Appearance - Well Developed] : well developed [Normal Appearance] : normal appearance [Well Groomed] : well groomed [General Appearance - Well Nourished] : well nourished [No Deformities] : no deformities [General Appearance - In No Acute Distress] : no acute distress [Heart Rate And Rhythm] : heart rate and rhythm were normal [Heart Sounds] : normal S1 and S2 [Murmurs] : no murmurs present [Respiration, Rhythm And Depth] : normal respiratory rhythm and effort [Exaggerated Use Of Accessory Muscles For Inspiration] : no accessory muscle use [Auscultation Breath Sounds / Voice Sounds] : lungs were clear to auscultation bilaterally [Abdominal] : abdominal area [Clean] : clean [Dry] : dry [Well-Healed] : well-healed [Palpable Crepitus] : no palpable crepitus [Bleeding] : no active bleeding [Foul Odor] : no foul smell [Purulent Drainage] : no purulent drainage [Serosanguineous Drainage] : no serosanquineous drainage [Serous Drainage] : no serous drainage [Erythema] : not erythematous [Warm] : not warm [Tender] : not tender [Indurated] : not indurated [Fluctuant] : not fluctuant [Abdomen Soft] : soft [Abdomen Tenderness] : non-tender [Abdomen Mass (___ Cm)] : no abdominal mass palpated [FreeTextEntry1] : Abdominal ICD site [Nail Clubbing] : no clubbing of the fingernails [Cyanosis, Localized] : no localized cyanosis [Petechial Hemorrhages (___cm)] : no petechial hemorrhages [] : no ischemic changes

## 2022-03-29 NOTE — PROCEDURE
[No] : not [ICD] : Implantable cardioverter-defibrillator [Medtronic] : Medtronic [VVI] : VVI [Charge Time: ___ sec] : charge time was [unfilled] seconds [Normal] : The battery status is normal. [Ventricular] : Ventricular [Lead Imp:  ___ohms] : lead impedance was [unfilled] ohms [Sensing Amplitude ___mv] : sensing amplitude was [unfilled] mv [___V @] : [unfilled] V [___ ms] : [unfilled] ms [None] : none [de-identified] : HR ~ 40 bpm  [de-identified] : Mitchell HAMM VR [de-identified] : [id number] [de-identified] : 9/10/15 [de-identified] : 4 years to [facility]  [de-identified] : RV DEFIB impedance 63\par SVC defib imp 62\par Optivol threshold at baseline

## 2022-03-29 NOTE — HISTORY OF PRESENT ILLNESS
[None] : The patient complains of no symptoms [de-identified] : This is an 81 yo man with an abdominal ICD system in place for secondary prevention of VT, original implant 1999, with generator change 2006.  His ICD reached the [facility] and he underwent generator change 9/10/15.  \par \par He has been feeling well.  No device related complaints.  The patient denies chest pain, SOB, BASILIO, palpitation, light headedness, syncope or change in exertional capacity.\par \par He reports that his HR is predominantly 50 when he checks.  No sigificant symptoms associated with this.

## 2022-03-30 ENCOUNTER — APPOINTMENT (OUTPATIENT)
Dept: HEART AND VASCULAR | Facility: CLINIC | Age: 82
End: 2022-03-30
Payer: MEDICARE

## 2022-03-30 ENCOUNTER — NON-APPOINTMENT (OUTPATIENT)
Age: 82
End: 2022-03-30

## 2022-03-30 VITALS
WEIGHT: 173.99 LBS | OXYGEN SATURATION: 97 % | DIASTOLIC BLOOD PRESSURE: 76 MMHG | HEIGHT: 69 IN | BODY MASS INDEX: 25.77 KG/M2 | HEART RATE: 91 BPM | SYSTOLIC BLOOD PRESSURE: 141 MMHG

## 2022-03-30 DIAGNOSIS — E78.00 PURE HYPERCHOLESTEROLEMIA, UNSPECIFIED: ICD-10-CM

## 2022-03-30 DIAGNOSIS — I10 ESSENTIAL (PRIMARY) HYPERTENSION: ICD-10-CM

## 2022-03-30 PROCEDURE — 93000 ELECTROCARDIOGRAM COMPLETE: CPT

## 2022-03-30 PROCEDURE — 99214 OFFICE O/P EST MOD 30 MIN: CPT

## 2022-03-30 NOTE — REASON FOR VISIT
[FreeTextEntry1] : In 1993 AVR,[employer]avan Valve for AS with Dr Monroe, 1995 V Tach and A Fib (had a cardiac arrest). AICD placed.  Amio started in 1998 for AICD shocks.  [facility]par Cardiac arrest #2 in 2003 or 2004 saved by AICD.  2015 device change. There is HFpEF but it  is compensated when in NSR.  No shocks in 3 yrs. EF is normal @ 55 by echo, 48% on Nuc\par \par 3/30/22 Doing well, no crdiac c/o, some insomnia\par \par EKG: NSR with 1st degree AVB. PRWP,  ST-Tw abnormalities. 9/13/18\par EKG: V paced can not tell underlying arial rhythm  3/30/22

## 2022-03-30 NOTE — ASSESSMENT
[FreeTextEntry1] : PAF- Maintaining NSR on Amio, pulmonologist in [place] follows for Amio Pulm toxicity\par \par HFpEF- stable\par \par Back Pain- Percocet renewed [id number]. Parking permit filled out \par \par VT- no shocks in 4 yrs\par \par AVR- Mechanical, St Javan's Valve, maintained on Coumadin, INR followed by Cardio in [place]\par \par HTN- stable\par \par PVD- reports claudication of RLE x 4 mos, unable to palpate distal pulses\par \par Prediabetes- A1c 5.7\par \par Insomnia- Alprazolam given, iSTOP done

## 2022-03-30 NOTE — HISTORY OF PRESENT ILLNESS
[FreeTextEntry1] : Cardio- Dr Yoo/April\par Pulm- Dr Pink\par Cardio- Dr Ames(retired)\par EP- Dr Brown\par Billing- Workers Comp: Mariza Huffman T [phone], [phone]\par Account Claims Claim # [id number]

## 2022-05-24 ENCOUNTER — NON-APPOINTMENT (OUTPATIENT)
Age: 82
End: 2022-05-24

## 2022-05-24 ENCOUNTER — APPOINTMENT (OUTPATIENT)
Dept: HEART AND VASCULAR | Facility: CLINIC | Age: 82
End: 2022-05-24
Payer: OTHER MISCELLANEOUS

## 2022-05-24 PROCEDURE — 93296 REM INTERROG EVL PM/IDS: CPT

## 2022-05-24 PROCEDURE — 93295 DEV INTERROG REMOTE 1/2/MLT: CPT

## 2022-08-17 ENCOUNTER — TELEPHONE (OUTPATIENT)
Dept: CARDIOLOGY CLINIC | Facility: CLINIC | Age: 82
End: 2022-08-17

## 2022-08-17 NOTE — TELEPHONE ENCOUNTER
Pt daughter/PCP, Berna, called for appt for Pt (sched for 09/14), along with asked for Dr Antonio Reddy to submit prescription for ECHO under workman's comp  In addition, Pt cherelle would like to know if Dr Antonio Reddy would like Pt to go for any additional studies / tests prior to appt

## 2022-08-19 DIAGNOSIS — I35.9 AORTIC VALVE DISORDER: Primary | ICD-10-CM

## 2022-08-19 NOTE — TELEPHONE ENCOUNTER
Order for echocardiogram placed  Will have to see the patient to decide if he needs any further testing after office visit

## 2022-08-23 ENCOUNTER — NON-APPOINTMENT (OUTPATIENT)
Age: 82
End: 2022-08-23

## 2022-08-23 ENCOUNTER — APPOINTMENT (OUTPATIENT)
Dept: HEART AND VASCULAR | Facility: CLINIC | Age: 82
End: 2022-08-23

## 2022-08-23 PROCEDURE — 93295 DEV INTERROG REMOTE 1/2/MLT: CPT

## 2022-08-23 PROCEDURE — 93296 REM INTERROG EVL PM/IDS: CPT

## 2022-09-02 NOTE — TELEPHONE ENCOUNTER
Called the daughter and asked if he can get a pt/inr since it has been over a year since we have record of being done  She states she does check it q 3-4 weeks and has been stable  I asked that she fax us the most recent INR so we have it on record  She agreed   Gave her fax # of [phone]

## 2022-09-14 ENCOUNTER — OFFICE VISIT (OUTPATIENT)
Dept: CARDIOLOGY CLINIC | Facility: CLINIC | Age: 82
End: 2022-09-14
Payer: MEDICARE

## 2022-09-14 VITALS
DIASTOLIC BLOOD PRESSURE: 68 MMHG | HEIGHT: 71 IN | WEIGHT: 180 LBS | SYSTOLIC BLOOD PRESSURE: 140 MMHG | BODY MASS INDEX: 25.2 KG/M2 | HEART RATE: 84 BPM | RESPIRATION RATE: 16 BRPM | OXYGEN SATURATION: 97 %

## 2022-09-14 DIAGNOSIS — E78.2 COMBINED HYPERLIPIDEMIA: ICD-10-CM

## 2022-09-14 DIAGNOSIS — I47.20 VENTRICULAR TACHYCARDIA: ICD-10-CM

## 2022-09-14 DIAGNOSIS — I71.20 THORACIC AORTIC ANEURYSM WITHOUT RUPTURE: ICD-10-CM

## 2022-09-14 DIAGNOSIS — I10 HYPERTENSION, ESSENTIAL: ICD-10-CM

## 2022-09-14 DIAGNOSIS — I35.9 AORTIC VALVE DISORDER: Primary | ICD-10-CM

## 2022-09-14 DIAGNOSIS — F41.9 ANXIETY: ICD-10-CM

## 2022-09-14 DIAGNOSIS — I42.0 DILATED CARDIOMYOPATHY (HCC): ICD-10-CM

## 2022-09-14 DIAGNOSIS — Z79.01 LONG TERM CURRENT USE OF ANTICOAGULANT: ICD-10-CM

## 2022-09-14 PROCEDURE — 99214 OFFICE O/P EST MOD 30 MIN: CPT | Performed by: INTERNAL MEDICINE

## 2022-09-14 RX ORDER — ATORVASTATIN CALCIUM 20 MG/1
20 TABLET, FILM COATED ORAL
Qty: 90 TABLET | Refills: 3 | Status: SHIPPED | OUTPATIENT
Start: 2022-09-14 | End: 2022-10-10 | Stop reason: SDUPTHER

## 2022-09-14 RX ORDER — AMIODARONE HYDROCHLORIDE 200 MG/1
200 TABLET ORAL DAILY
Qty: 90 TABLET | Refills: 3 | Status: SHIPPED | OUTPATIENT
Start: 2022-09-14 | End: 2022-10-10 | Stop reason: SDUPTHER

## 2022-09-14 RX ORDER — ATENOLOL 25 MG/1
25 TABLET ORAL DAILY
Qty: 90 TABLET | Refills: 3 | Status: SHIPPED | OUTPATIENT
Start: 2022-09-14 | End: 2022-10-10 | Stop reason: SDUPTHER

## 2022-09-14 RX ORDER — SPIRONOLACTONE 25 MG/1
25 TABLET ORAL DAILY
Qty: 90 TABLET | Refills: 3 | Status: SHIPPED | OUTPATIENT
Start: 2022-09-14 | End: 2022-10-10 | Stop reason: SDUPTHER

## 2022-09-14 RX ORDER — WARFARIN SODIUM 5 MG/1
5 TABLET ORAL DAILY
Qty: 90 TABLET | Refills: 4 | Status: SHIPPED | OUTPATIENT
Start: 2022-09-14

## 2022-09-14 RX ORDER — ALPRAZOLAM 0.25 MG/1
0.25 TABLET ORAL 2 TIMES DAILY PRN
Qty: 90 TABLET | Refills: 0 | Status: SHIPPED | OUTPATIENT
Start: 2022-09-14 | End: 2022-09-27 | Stop reason: SDUPTHER

## 2022-09-14 NOTE — PROGRESS NOTES
[facility] [address] [address]  Cardiology Follow Up    Lonnie Baird  1940  [id number]      1  Aortic valve disorder  warfarin (COUMADIN) 5 mg tablet   2  Hypertension, essential  spironolactone (ALDACTONE) 25 mg tablet    atenolol (TENORMIN) 25 mg tablet   3  Thoracic aortic aneurysm without rupture ([address] )     4  Ventricular tachycardia (HCC)  amiodarone 200 mg tablet   5  Anxiety  ALPRAZolam (XANAX) 0 25 mg tablet   6  Long term current use of anticoagulant     7  Combined hyperlipidemia  atorvastatin (LIPITOR) 20 mg tablet   8  Dilated cardiomyopathy [facility])         Chief Complaint   Patient presents with    Follow-up       Interval History:  Patient presents for follow-up visit  Patient denies any history of chest pain shortness of breath  Patient denies any history of leg edema or orthopnea PND  No history of presyncope syncope  Patient states compliance with the present list of medications  Patient has history of cardiomyopathy with recovered ejection fraction  Patient also has history of nonsustained ventricular tachycardia maintained on amiodarone prophylaxis  Patient is on Coumadin for prosthetic mechanical aortic valve replacement  Patient had echocardiogram this year through his daughter who is a physician in [place]  Patient denies any bleeding issues  Coumadin followed in [place]      Patient Active Problem List   Diagnosis    Aortic valve disorder    Hypertension, essential    Thoracic aortic aneurysm, without rupture ([address] )    Ventricular tachycardia ([address] )    Open wound of right lower leg    PAD (peripheral artery disease) ([address] )    Dilated cardiomyopathy ([address] )     Past Medical History:   Diagnosis Date    Aortic valve stenosis     AVD (aortic valve disease)     Chest pain     CHF (congestive heart failure) (HCC)     Chronic obstructive lung disease (HCC)     Dizziness     Dyspepsia     Fatigue     Headache  HLD (hyperlipidemia)     HTN (hypertension)     Lumbar radiculopathy     Native stenosis of renal artery (HCC)     Nephrolithiasis     Precordial chest pain     Respiratory abnormality     Sleep apnea     SOB (shortness of breath)     Ventricular tachycardia (HCC)      Social History     Socioeconomic History    Marital status: /Civil Union     Spouse name: Not on file    Number of children: Not on file    Years of education: Not on file    Highest education level: Not on file   Occupational History    Not on file   Tobacco Use    Smoking status: Former Smoker    Smokeless tobacco: Never Used   Substance and Sexual Activity    Alcohol use: Never    Drug use: Never    Sexual activity: Not on file   Other Topics Concern    Not on file   Social History Narrative    Not on file     Social Determinants of Health     Financial Resource Strain: Not on file   Food Insecurity: Not on file   Transportation Needs: Not on file   Physical Activity: Not on file   Stress: Not on file   Social Connections: Not on file   Intimate Partner Violence: Not on file   Housing Stability: Not on file      Family History   Problem Relation Age of Onset    Heart disease Sister     Pneumonia Family      Past Surgical History:   Procedure Laterality Date    APPENDECTOMY      CARDIAC DEFIBRILLATOR PLACEMENT      CARDIAC VALVE REPLACEMENT      CATARACT EXTRACTION, BILATERAL         Current Outpatient Medications:     ALPRAZolam (XANAX) 0 25 mg tablet, Take 1 tablet (0 25 mg total) by mouth 2 (two) times a day as needed for anxiety, Disp: 90 tablet, Rfl: 0    amiodarone 200 mg tablet, Take 1 tablet (200 mg total) by mouth daily, Disp: 90 tablet, Rfl: 3    atenolol (TENORMIN) 25 mg tablet, Take 1 tablet (25 mg total) by mouth daily, Disp: 90 tablet, Rfl: 3    atorvastatin (LIPITOR) 20 mg tablet, Take 1 tablet (20 mg total) by mouth daily at bedtime, Disp: 90 tablet, Rfl: 3    losartan (COZAAR) 50 mg tablet, Take 50 mg by mouth 2 (two) times a day, Disp: , Rfl:     pantoprazole (PROTONIX) 40 mg tablet, Take 1 tablet (40 mg total) by mouth daily, Disp: 90 tablet, Rfl: 3    pantoprazole (PROTONIX) 40 mg tablet, TAKE 1 TABLET DAILY, Disp: 90 tablet, Rfl: 2    sodium hypochlorite (DAKIN'S HALF-STRENGTH) external solution, Apply 1 application topically every other day, Disp: 473 mL, Rfl: 0    spironolactone (ALDACTONE) 25 mg tablet, Take 1 tablet (25 mg total) by mouth daily, Disp: 90 tablet, Rfl: 3    tamsulosin (FLOMAX) 0 4 mg, Take 0 4 mg by mouth daily with dinner, Disp: , Rfl:     tiotropium-olodaterol (STIOLTO RESPIMAT) 2 5-2 5 MCG/ACT inhaler, Inhale 2 puffs daily, Disp: , Rfl:     warfarin (COUMADIN) 5 mg tablet, Take 1 tablet (5 mg total) by mouth daily As directed, Disp: 90 tablet, Rfl: 4  Allergies   Allergen Reactions    Metoprolol        Labs:  No visits with results within 2 Month(s) from this visit  Latest known visit with results is:   Office Visit on 10/05/2021   Component Date Value    Wound Culture 10/05/2021 Growth in Broth culture only Staphylococcus coagulase negative (A)    Gram Stain Result 10/05/2021 No Polys or Bacteria seen      Imaging: No results found      Review of Systems:  Review of Systems   REVIEW OF SYSTEMS:  Constitutional:  Denies fever or chills   Eyes:  Denies change in visual acuity   HENT:  Denies nasal congestion or sore throat   Respiratory:  Denies cough or shortness of breath   Cardiovascular:  Denies chest pain or edema   GI:  Denies abdominal pain, nausea, vomiting, bloody stools or diarrhea   :  Denies dysuria, frequency, difficulty in micturition and nocturia  Musculoskeletal:  Denies back pain or joint pain   Neurologic:  Denies headache, focal weakness or sensory changes   Endocrine:  Denies polyuria or polydipsia   Lymphatic:  Denies swollen glands   Psychiatric:   anxiety     Physical Exam:    /68 (BP Location: Left arm, Patient Position: Sitting, Cuff Size: Standard)   Pulse 84   Resp 16   Ht 5' 11" (1 803 m)   Wt 81 6 kg (180 lb)   SpO2 97%   BMI 25 10 kg/m²     Physical Exam   PHYSICAL EXAM:  General:  Patient is not in acute distress   Head: Normocephalic, Atraumatic  HEENT:  Both pupils normal-size atraumatic, normocephalic, nonicteric  Neck:  JVP not raised  Trachea central  No carotid bruit  Respiratory:  normal breath sounds no crackles  no rhonchi  Cardiovascular:  Regular rate and rhythm no S3 no murmurs  Heart sounds consistent with prosthetic mechanical valve  GI:  Abdomen soft nontender  No organomegaly  Lymphatic:  No cervical or inguinal lymphadenopathy  Neurologic:  Patient is awake alert, oriented   Grossly nonfocal  Extremities no edema    Discussion/Summary:  Patient with multiple medical problems who seems to be doing reasonably well from cardiac standpoint  Previous studies reviewed with patient  Medications reviewed and possible side effects discussed  concepts of cardiovascular disease , signs and symptoms of heart disease  Dietary and risk factor modification reinforced  All questions answered  Safety measures reviewed  Patient advised to report any problems prompting medical attention  Symptoms to watch out from cardiac standpoint which would indicate the need for further cardiac evaluation discussed  Patient will continue warfarin for prosthetic mechanical aortic valve replacement  Warfarin followed in [place]  Prescriptions reviewed and refilled  Patient also has an ICD which is followed in [place] also  Follow-up in 6 months or earlier as needed  Patient is agreeable with the plan of care  Echocardiogram report from [place] reviewed

## 2022-09-27 DIAGNOSIS — F41.9 ANXIETY: ICD-10-CM

## 2022-09-27 RX ORDER — ALPRAZOLAM 0.25 MG/1
0.25 TABLET ORAL 2 TIMES DAILY PRN
Qty: 90 TABLET | Refills: 0 | Status: SHIPPED | OUTPATIENT
Start: 2022-09-27

## 2022-09-27 NOTE — TELEPHONE ENCOUNTER
Pt's dtr lm on [place] vm stating that at pt's last visit the med was ordered but it got sent to [facility] & she would like that to be CX & RE-SENT to the [facility]    Pt's dtr is also req that pt's last EKG & AVS be faxed to Workers [employer] at: [phone]

## 2022-09-27 NOTE — TELEPHONE ENCOUNTER
I sent the prescription to [facility]  Please call [facility] to make sure that his prescriptions sent about 10 to 12 days ago did not get filled for the same medication

## 2022-09-28 NOTE — TELEPHONE ENCOUNTER
Sp[facility] with [facility] CASTILLO [facility]  was canceled for patient   [facility]

## 2022-10-10 ENCOUNTER — TELEPHONE (OUTPATIENT)
Dept: CARDIOLOGY CLINIC | Facility: CLINIC | Age: 82
End: 2022-10-10

## 2022-10-10 DIAGNOSIS — E78.2 COMBINED HYPERLIPIDEMIA: ICD-10-CM

## 2022-10-10 DIAGNOSIS — I10 HYPERTENSION, ESSENTIAL: ICD-10-CM

## 2022-10-10 DIAGNOSIS — I47.20 VENTRICULAR TACHYCARDIA: ICD-10-CM

## 2022-10-10 RX ORDER — ATENOLOL 25 MG/1
25 TABLET ORAL DAILY
Qty: 90 TABLET | Refills: 3 | Status: SHIPPED | OUTPATIENT
Start: 2022-10-10

## 2022-10-10 RX ORDER — AMIODARONE HYDROCHLORIDE 200 MG/1
200 TABLET ORAL DAILY
Qty: 90 TABLET | Refills: 3 | Status: SHIPPED | OUTPATIENT
Start: 2022-10-10

## 2022-10-10 RX ORDER — SPIRONOLACTONE 25 MG/1
25 TABLET ORAL DAILY
Qty: 90 TABLET | Refills: 3 | Status: SHIPPED | OUTPATIENT
Start: 2022-10-10

## 2022-10-10 RX ORDER — ATORVASTATIN CALCIUM 20 MG/1
20 TABLET, FILM COATED ORAL
Qty: 90 TABLET | Refills: 3 | Status: SHIPPED | OUTPATIENT
Start: 2022-10-10

## 2022-10-10 NOTE — TELEPHONE ENCOUNTER
Pt's daughter Dr Lakshmi Brooke called and would like pt's medication to be resent to [facility] on file

## 2022-11-23 ENCOUNTER — APPOINTMENT (OUTPATIENT)
Dept: HEART AND VASCULAR | Facility: CLINIC | Age: 82
End: 2022-11-23

## 2022-11-23 ENCOUNTER — NON-APPOINTMENT (OUTPATIENT)
Age: 82
End: 2022-11-23

## 2022-11-23 PROCEDURE — 93296 REM INTERROG EVL PM/IDS: CPT

## 2022-11-23 PROCEDURE — 93295 DEV INTERROG REMOTE 1/2/MLT: CPT

## 2023-02-15 ENCOUNTER — APPOINTMENT (OUTPATIENT)
Dept: VASCULAR SURGERY | Facility: CLINIC | Age: 83
End: 2023-02-15
Payer: MEDICARE

## 2023-02-15 VITALS
BODY MASS INDEX: 24.22 KG/M2 | WEIGHT: 173 LBS | SYSTOLIC BLOOD PRESSURE: 168 MMHG | HEART RATE: 78 BPM | DIASTOLIC BLOOD PRESSURE: 84 MMHG | HEIGHT: 71 IN

## 2023-02-15 DIAGNOSIS — R29.898 OTHER SYMPTOMS AND SIGNS INVOLVING THE MUSCULOSKELETAL SYSTEM: ICD-10-CM

## 2023-02-15 DIAGNOSIS — R53.81 OTHER MALAISE: ICD-10-CM

## 2023-02-15 DIAGNOSIS — I73.9 PERIPHERAL VASCULAR DISEASE, UNSPECIFIED: ICD-10-CM

## 2023-02-15 DIAGNOSIS — Z87.891 PERSONAL HISTORY OF NICOTINE DEPENDENCE: ICD-10-CM

## 2023-02-15 DIAGNOSIS — M54.10 RADICULOPATHY, SITE UNSPECIFIED: ICD-10-CM

## 2023-02-15 PROCEDURE — 99213 OFFICE O/P EST LOW 20 MIN: CPT

## 2023-02-15 RX ORDER — BRINZOLAMIDE 10 MG/ML
1 SUSPENSION/ DROPS OPHTHALMIC
Refills: 0 | Status: DISCONTINUED | COMMUNITY
End: 2023-02-15

## 2023-02-15 RX ORDER — ALPRAZOLAM 0.25 MG/1
0.25 TABLET ORAL
Qty: 30 | Refills: 0 | Status: DISCONTINUED | COMMUNITY
Start: 2022-03-30 | End: 2023-02-15

## 2023-02-22 ENCOUNTER — APPOINTMENT (OUTPATIENT)
Dept: HEART AND VASCULAR | Facility: CLINIC | Age: 83
End: 2023-02-22

## 2023-02-23 PROBLEM — R53.81 PHYSICAL DECONDITIONING: Status: ACTIVE | Noted: 2023-02-23

## 2023-02-23 PROBLEM — R29.898 MUSCULAR DECONDITIONING: Status: ACTIVE | Noted: 2023-02-23

## 2023-02-23 NOTE — HISTORY OF PRESENT ILLNESS
[FreeTextEntry1] : 81 y/o M w/ PMHx of HTN, HLD, GERD, COPD, ENEDINA, AVR 1993 (St Javan Valve for AS with Dr Monroe - on Coumadin), cardiac arrest 2/2 V Tach and A Fib in 1995 s/p AICD placed. Amio started in 1998 for AICD shocks. Cardiac arrest #2 in early 2000s saved by AICD. AICD device changed in 2015. He has HFpEF but it is compensated when in NSR (EF55%). Also, he has lyvE1DR, insomia, and PAD (RLE).\par \par He presents for evaluation of claudication. Patient experienced a fall 2 yrs ago, developed a bruise over the R shin and a wound. The wound took some time to heal with different topicals and since then, he has residual swelling, and a scar. He explains the area still hurts and daughter mentions it is possibly from scar tissue but there is concerned it is related to poor circulation. He experiences severe pain, burning sensations radiating over the thigh and calf when walking. He experiences back pain too when standing for prolonged period of time with hx of scoliosis. At night time, he also mentions occasional pain on the L foot which only improves with massage. \par \par \par SHx:\par -Former smoker\par -Car expert/\par \par Daughter is PCP, Dr Gavin\par

## 2023-02-23 NOTE — ASSESSMENT
[Arterial/Venous Disease] : arterial/venous disease [FreeTextEntry1] : 83 y/o M w/ mild PAD  (R>L; AT occlusions bilt), radiculopathy and chronic LEs edema. \par On exam, well healed wound on R shin with scar tissue. Non- palpable DP/PT pulses, good triphasic DP signal b/l,  L weak PT, excellent R PT signals with hand held Doppler. Strong palpable femoral pulses bilaterally. Feet warm to touch, toes pink with adequate capillary refill. Bilateral mild to moderate ankle edema. Skin intact. No murmurs. No cervical bruits. \par Patient reassured of good LE circulation. Symptoms are not related to arterial occlusions, symptoms are most likely neurogenic and musculoskeletal in nature. Physical therapy recommended, Nismat. Also, he would benefit from sequential compression pump or lymphedema pump recommended for the chronic edema edema. No vascular procedures indicated at this time. \par He should stay active and walk daily. Keep skin moisturized. F/u prn

## 2023-02-23 NOTE — PHYSICAL EXAM
[Respiratory Effort] : normal respiratory effort [No Rash or Lesion] : No rash or lesion [Alert] : alert [Oriented to Person] : oriented to person [Oriented to Place] : oriented to place [Oriented to Time] : oriented to time [Calm] : calm [Ankle Swelling (On Exam)] : present [Ankle Swelling Bilaterally] : bilaterally  [Ankle Swelling On The Right] : mild [Murmur] : murmur was appreciated  [Femoral Arteries] : Normal femoral pulses [2+] : left 2+ [Carotid Bruits] : no carotid bruits [Right Carotid Bruit] : no bruit heard over the right carotid [Left Carotid Bruit] : no bruit heard over the left carotid [Varicose Veins Of Lower Extremities] : not present [] : not present [Abdomen Masses] : No abdominal masses [Abdomen Tenderness] : ~T ~M No abdominal tenderness [Abdominal Bruit] : no abdominal bruit  [de-identified] : WN/WD [FreeTextEntry1] : Non- palpable DP/PT pulses, good triphasic DP signal b/l,  L weak PT, excellent R PT signals with hand held doppler. Strong palpable femoral pulses bilaterally. Feet warm to touch, toes pink with adequate capillary refill. SKin intact. No murmurs. No cervical bruits.  [de-identified] : soft, non distended  [de-identified] : FROM

## 2023-02-23 NOTE — REVIEW OF SYSTEMS
[As noted in HPI] : as noted in HPI [Negative] : Heme/Lymph [Lower Ext Edema] : lower extremity edema [As Noted in HPI] : as noted in HPI

## 2023-02-23 NOTE — ADDENDUM
[FreeTextEntry1] : I, Dr. Shawn Kelley, personally performed the evaluation and management (E/M) services for this new patient.  That E/M includes conducting the initial examination, assessing all conditions, and establishing the plan of care.  Today, my ACP, Gwen Das NP, was here to observe my evaluation and management services for this patient to be followed going forward.

## 2023-02-26 ENCOUNTER — FORM ENCOUNTER (OUTPATIENT)
Age: 83
End: 2023-02-26

## 2023-03-06 ENCOUNTER — APPOINTMENT (OUTPATIENT)
Dept: OPHTHALMOLOGY | Facility: CLINIC | Age: 83
End: 2023-03-06
Payer: MEDICARE

## 2023-03-06 ENCOUNTER — NON-APPOINTMENT (OUTPATIENT)
Age: 83
End: 2023-03-06

## 2023-03-06 PROCEDURE — 76514 ECHO EXAM OF EYE THICKNESS: CPT

## 2023-03-06 PROCEDURE — 92004 COMPRE OPH EXAM NEW PT 1/>: CPT

## 2023-03-06 PROCEDURE — 92250 FUNDUS PHOTOGRAPHY W/I&R: CPT

## 2023-03-06 PROCEDURE — 92020 GONIOSCOPY: CPT

## 2023-03-06 PROCEDURE — 92083 EXTENDED VISUAL FIELD XM: CPT

## 2023-03-28 ENCOUNTER — NON-APPOINTMENT (OUTPATIENT)
Age: 83
End: 2023-03-28

## 2023-03-28 ENCOUNTER — APPOINTMENT (OUTPATIENT)
Dept: HEART AND VASCULAR | Facility: CLINIC | Age: 83
End: 2023-03-28
Payer: MEDICARE

## 2023-03-28 VITALS
HEIGHT: 71 IN | SYSTOLIC BLOOD PRESSURE: 142 MMHG | BODY MASS INDEX: 25.06 KG/M2 | HEART RATE: 82 BPM | DIASTOLIC BLOOD PRESSURE: 66 MMHG | WEIGHT: 179 LBS

## 2023-03-28 DIAGNOSIS — Z95.810 PRESENCE OF AUTOMATIC (IMPLANTABLE) CARDIAC DEFIBRILLATOR: ICD-10-CM

## 2023-03-28 DIAGNOSIS — I47.20 VENTRICULAR TACHYCARDIA, UNSPECIFIED: ICD-10-CM

## 2023-03-28 PROCEDURE — 99212 OFFICE O/P EST SF 10 MIN: CPT | Mod: 25

## 2023-03-28 PROCEDURE — 93282 PRGRMG EVAL IMPLANTABLE DFB: CPT

## 2023-03-28 NOTE — ADDENDUM
[FreeTextEntry1] : I, Kathryn Strong, am scribing for and the presence of Dr. Brown the following sections: HPI, PMH,Family/social history, ROS, Physical Exam, Assessment / Plan.\par \par I, Ori Brown, personally performed the services described in the documentation, reviewed the documentation recorded by the scribe in my presence and it accurately and completely records my words and actions.\par

## 2023-03-28 NOTE — HISTORY OF PRESENT ILLNESS
[de-identified] : This is an 83 yo man with an abdominal ICD system in place for secondary prevention of VT, original implant 1999, with generator change 2006.  His ICD reached the ISIAH and he underwent generator change 9/10/15.  \par \par He suffered a mechanical fall and has significant back pain r/t lumbar stenosis. Notes exertional tolerance is limited by SOB / COPD.  \par \par No device related complaints.  \par \par He reports that his HR is predominantly 50 when he checks.  No significant symptoms associated with this.\par \par Echo 4/4/22 EF 50%\par \par SEE PACEART

## 2023-04-11 ENCOUNTER — APPOINTMENT (OUTPATIENT)
Dept: HEART AND VASCULAR | Facility: CLINIC | Age: 83
End: 2023-04-11
Payer: OTHER MISCELLANEOUS

## 2023-04-11 ENCOUNTER — APPOINTMENT (OUTPATIENT)
Dept: HEART AND VASCULAR | Facility: CLINIC | Age: 83
End: 2023-04-11

## 2023-04-11 VITALS
TEMPERATURE: 97 F | DIASTOLIC BLOOD PRESSURE: 78 MMHG | HEART RATE: 82 BPM | BODY MASS INDEX: 25.2 KG/M2 | SYSTOLIC BLOOD PRESSURE: 132 MMHG | OXYGEN SATURATION: 97 % | WEIGHT: 180 LBS | HEIGHT: 71 IN

## 2023-04-11 DIAGNOSIS — I44.2 ATRIOVENTRICULAR BLOCK, COMPLETE: ICD-10-CM

## 2023-04-11 DIAGNOSIS — I48.0 PAROXYSMAL ATRIAL FIBRILLATION: ICD-10-CM

## 2023-04-11 DIAGNOSIS — Z00.00 ENCOUNTER FOR GENERAL ADULT MEDICAL EXAMINATION W/OUT ABNORMAL FINDINGS: ICD-10-CM

## 2023-04-11 PROCEDURE — 99214 OFFICE O/P EST MOD 30 MIN: CPT

## 2023-04-11 PROCEDURE — 93000 ELECTROCARDIOGRAM COMPLETE: CPT

## 2023-04-11 NOTE — HISTORY OF PRESENT ILLNESS
[FreeTextEntry1] : Cardio- Dr Yoo/April\par Pulm- Dr Pink\par Cardio- Dr Ames(retired)\par EP- Dr Brown\par Billing- Workers Comp: Mariza Huffman T [phone], F [phone]\par Account Claims Claim # [id number]

## 2023-04-11 NOTE — REASON FOR VISIT
[FreeTextEntry1] : In 1993 AVR,St Javan Valve for AS with Dr Monroe, 1995 V Tach and A Fib (had a cardiac arrest). AICD placed.  Amio started in 1998 for AICD shocks.  [facility]par Cardiac arrest #2 in 2003 or 2004 saved by AICD.  2015 device change. There is HFpEF but it  is compensated when in NSR.  No shocks in 3 yrs. EF is normal @ 55 by echo, 48% on Nuc\par \par 3/30/22 Doing well, no cardiac c/o, some insomnia\par 4/11/23  EKG with CHB, Ao 50 mm by CT, increased from 45 in 2016.  EF 50% on echo 2022\par \par EKG: NSR with 1st degree AVB. PRWP,  ST-Tw abnormalities. 9/13/18\par EKG: V paced can not tell underlying atrial rhythm  3/30/22\par EKG: V pacing, P waves marching through c/w CHB, 4/11/23

## 2023-04-11 NOTE — ASSESSMENT
[FreeTextEntry1] : PAF- Maintaining NSR on Amio, pulmonologist in [place] follows for Amio Pulm toxicity\par EKG has complete heart block today. pacer is single lead. Will probably do nothing differently as pt feels well.  Will discuss with EP.\par \par Complete HB- V paced so well tolerated\par \par Dilated Ao- 45 mm in 2016 by CT, now 50 mm\par \par HFpEF- stable\par \par VT- no shocks in 5 yrs\par \par AVR- Mechanical, St Javan's Valve, maintained on Coumadin, INR followed by Cardio in [place]\par \par HTN- stable\par \par PVD- reports claudication of RLE x 4 mos, unable to palpate distal pulses\par \par Prediabetes- A1c 5.7, 5.5.   LDL 77\par \par Back Pain- Percocet renewed [phone]. Parking permit filled out \par \par Insomnia- Alprazolam given, iSTOP done

## 2023-06-22 ENCOUNTER — NON-APPOINTMENT (OUTPATIENT)
Age: 83
End: 2023-06-22

## 2023-06-22 ENCOUNTER — APPOINTMENT (OUTPATIENT)
Dept: PULMONOLOGY | Facility: CLINIC | Age: 83
End: 2023-06-22
Payer: MEDICARE

## 2023-06-22 VITALS
BODY MASS INDEX: 25.2 KG/M2 | DIASTOLIC BLOOD PRESSURE: 90 MMHG | TEMPERATURE: 97.7 F | SYSTOLIC BLOOD PRESSURE: 180 MMHG | HEIGHT: 71 IN | OXYGEN SATURATION: 99 % | RESPIRATION RATE: 12 BRPM | HEART RATE: 91 BPM | WEIGHT: 180 LBS

## 2023-06-22 DIAGNOSIS — G47.33 OBSTRUCTIVE SLEEP APNEA (ADULT) (PEDIATRIC): ICD-10-CM

## 2023-06-22 DIAGNOSIS — F51.04 PSYCHOPHYSIOLOGIC INSOMNIA: ICD-10-CM

## 2023-06-22 DIAGNOSIS — J44.9 CHRONIC OBSTRUCTIVE PULMONARY DISEASE, UNSPECIFIED: ICD-10-CM

## 2023-06-22 PROCEDURE — 99204 OFFICE O/P NEW MOD 45 MIN: CPT | Mod: 25

## 2023-06-22 PROCEDURE — ZZZZZ: CPT

## 2023-06-22 PROCEDURE — 94010 BREATHING CAPACITY TEST: CPT

## 2023-06-22 PROCEDURE — 94618 PULMONARY STRESS TESTING: CPT

## 2023-06-22 PROCEDURE — 94727 GAS DIL/WSHOT DETER LNG VOL: CPT

## 2023-06-22 PROCEDURE — 94729 DIFFUSING CAPACITY: CPT

## 2023-06-23 PROBLEM — G47.33 OSA TREATED WITH BIPAP: Status: ACTIVE | Noted: 2018-09-13

## 2023-06-23 PROBLEM — F51.04 CHRONIC INSOMNIA: Status: ACTIVE | Noted: 2022-03-30

## 2023-06-23 PROBLEM — J44.9 CHRONIC OBSTRUCTIVE PULMONARY DISEASE, UNSPECIFIED COPD TYPE: Status: ACTIVE | Noted: 2018-09-13

## 2023-06-23 NOTE — PHYSICAL EXAM
[No Acute Distress] : no acute distress [Normal Appearance] : normal appearance [Normal Rate/Rhythm] : normal rate/rhythm [Normal S1, S2] : normal s1, s2 [No Resp Distress] : no resp distress [Clear to Auscultation Bilaterally] : clear to auscultation bilaterally [Oriented x3] : oriented x3 [Normal Affect] : normal affect [Normal Gait] : normal gait

## 2023-06-23 NOTE — REASON FOR VISIT
[Initial] : an initial visit [COPD] : COPD [Shortness of Breath] : shortness of breath [Sleep Apnea] : sleep apnea

## 2023-06-27 ENCOUNTER — APPOINTMENT (OUTPATIENT)
Dept: HEART AND VASCULAR | Facility: CLINIC | Age: 83
End: 2023-06-27
Payer: MEDICARE

## 2023-06-27 ENCOUNTER — NON-APPOINTMENT (OUTPATIENT)
Age: 83
End: 2023-06-27

## 2023-06-27 PROCEDURE — 93295 DEV INTERROG REMOTE 1/2/MLT: CPT

## 2023-06-27 PROCEDURE — 93296 REM INTERROG EVL PM/IDS: CPT

## 2023-06-28 ENCOUNTER — NON-APPOINTMENT (OUTPATIENT)
Age: 83
End: 2023-06-28

## 2023-06-28 NOTE — ADDENDUM
[FreeTextEntry1] : PFTS from 6/22/2023 reviewed\par FEV1 88, FEV1/, TLC 80, DLCO 66, DLCO/\par \par 6MWT from 6/22/2023\par no desaturation\par \par Tabby SIGALA

## 2023-06-28 NOTE — CONSULT LETTER
[Dear  ___] : Dear  [unfilled], [Courtesy Letter:] : I had the pleasure of seeing your patient, [unfilled], in my office today. [Please see my note below.] : Please see my note below. [Consult Closing:] : Thank you very much for allowing me to participate in the care of this patient.  If you have any questions, please do not hesitate to contact me. [Sincerely,] : Sincerely, [FreeTextEntry3] : Quyen Wright MD\par \par [employer] [facility]\par Pulmonary, Critical Care, and Sleep Medicine\par

## 2023-06-28 NOTE — REVIEW OF SYSTEMS
[Dyspnea] : dyspnea [SOB on Exertion] : sob on exertion [GERD] : gerd [Fever] : no fever [Chills] : no chills [Cough] : no cough [Chest Tightness] : no chest tightness [Sputum] : no sputum [Wheezing] : no wheezing [Chest Discomfort] : no chest discomfort [Edema] : no edema [Orthopnea] : no orthopnea [Hay Fever] : no hay fever [Seasonal Allergies] : no seasonal allergies [Nasal Discharge] : no nasal discharge [Dizziness] : no dizziness [Paralysis] : no paralysis [TextBox_14] : hoarse voice

## 2023-06-28 NOTE — ASSESSMENT
[FreeTextEntry1] : Reviewed:\par 1. CT Chest ([facility]) 03/16/23: Bilateral apical pleural parenchymal thickening. Mild paraseptal emphysema and centrilobular emphysema. There are mild paraseptal emphysema and centrilobular emphysema. \par 2. TTE 4/11/23: LVEF 41%, moderate LV hypertrophy. Severe LV diastolic dysfunction. Severe biatrial enlargement. No pulm HTN. \par 3. PAP data (30 day; from machine): avg AHI 5.6; 0.4 central AHI; average use 4.3 hrs; 18/30 days of usage\par \par The patient is an 82 year old M, former smoker, with PMHx of AVR, s/p AICD placed 1993, HFpEF, HTN, pAF, COPD, ENEDINA, previously being followed by a pulmonologist in [place] but is transferring care to [facility] as he is spending more time with family here. \par \par ENEDINA: Patient feels machine is currently not warming air properly. As a result, he believes it is contributing to hoarseness of his voice. Patient and daughter state he saw ENT in [place] in the last month and were told examination was normal. 30 days of compliance and therapy data reviewed on patient's machine today. Average AHI therapeutic, although AHI last night (6/21) 16.8. Patient using machine >4 hrs per night,  compliance 18/30 days is less than goal of 70% of days. Unable to adjust tube temperature settings on machine today. Patient's daughter will send name of home care company; will then link to [email or web address] for more detailed data. \par \par COPD: No exacerbations or hospitalizations per daughter. Patient currently taking Stiolto 2.5-2.5 2 puffs daily. Using albuterol (1 puff daily with exertion). Last PFTs about 2-3 years ago; daughter will send copies of PFTs. Will need repeat PFTs. CT Chest 3/23 with mild emphysema, daughter states has been getting yearly CTs. Note that was previously on Advair but had worsening of his glaucoma which has improved since stopping it. If patient's symptoms progress, can consider risk vs. benefits of starting triple therapy. Quit smoking more than 30 years ago; does not qualify for lung cancer screening. \par \par \par Follow-up in 3 months after PFTs. \par

## 2023-06-28 NOTE — HISTORY OF PRESENT ILLNESS
[Former] : former [< 20 pack-years] : < 20 pack-years [TextBox_4] : The patient is an 82 year old M, former smoker, with PMHx of AVR, s/p AICD placed 1993, cardiac arrest x 2, HFpEF, HTN, pAF COPD, ENEDINA, previously being followed by a pulmonologist in [place] but is transferring care to [facility] as he is spending more time with family here. Accompanied by daughter who is helping to provide history and translation. Currently taking Stiolto 2.5-2.5, 2 puffs daily and albuterol prn. Using albuterol once daily with exertion. Was able to walk 4 blocks from "[employer] [place] today. Has difficulty walking due to MSK pain in his legs. No COPD exacerbations, never hospitalized for lung issues. Is a former smoker, 1 pack to 2 packs daily x 5 years; quit 30 years ago. Is having issues with his PAP machine; states that the water no longer heats up properly. Has worsening of HF recently per cardiology and wondering if machine is functioning properly/still therapeutic. Has hoarseness in voice which he thinks may be due to this cold/dry air.  Saw ENT last month and was told exam was normal. Hoarseness is variable, sometimes better as the day goes on. Takes pantoprazole daily. \par \par DME:\par Mask: Crooks LT\par Machine: Aircurve 10, EPAP: 14, Pressure support 4\par \par \par \par  [TextBox_11] : 1-2 [TextBox_13] : 5 [YearQuit] : 1993 [ESS] : 2

## 2023-07-26 ENCOUNTER — APPOINTMENT (OUTPATIENT)
Dept: CARDIOTHORACIC SURGERY | Facility: CLINIC | Age: 83
End: 2023-07-26
Payer: MEDICARE

## 2023-07-26 ENCOUNTER — NON-APPOINTMENT (OUTPATIENT)
Age: 83
End: 2023-07-26

## 2023-07-26 VITALS
HEART RATE: 67 BPM | DIASTOLIC BLOOD PRESSURE: 73 MMHG | SYSTOLIC BLOOD PRESSURE: 175 MMHG | HEIGHT: 71 IN | WEIGHT: 178 LBS | BODY MASS INDEX: 24.92 KG/M2 | TEMPERATURE: 97.2 F | RESPIRATION RATE: 14 BRPM | OXYGEN SATURATION: 97 %

## 2023-07-26 DIAGNOSIS — I50.30 UNSPECIFIED DIASTOLIC (CONGESTIVE) HEART FAILURE: ICD-10-CM

## 2023-07-26 DIAGNOSIS — I71.20 THORACIC AORTIC ANEURYSM, WITHOUT RUPTURE, UNSPECIFIED: ICD-10-CM

## 2023-07-26 DIAGNOSIS — I77.810 THORACIC AORTIC ECTASIA: ICD-10-CM

## 2023-07-26 DIAGNOSIS — Z95.2 PRESENCE OF PROSTHETIC HEART VALVE: ICD-10-CM

## 2023-07-26 PROCEDURE — 99204 OFFICE O/P NEW MOD 45 MIN: CPT

## 2023-07-27 PROBLEM — I77.810 DILATED AORTIC ROOT: Status: ACTIVE | Noted: 2023-04-11

## 2023-07-27 PROBLEM — I71.20 THORACIC AORTIC ANEURYSM WITHOUT RUPTURE: Status: ACTIVE | Noted: 2023-07-27

## 2023-07-27 PROBLEM — I50.30 (HFPEF) HEART FAILURE WITH PRESERVED EJECTION FRACTION: Status: ACTIVE | Noted: 2018-09-13

## 2023-07-27 PROBLEM — Z95.2 MECHANICAL HEART VALVE PRESENT: Status: ACTIVE | Noted: 2018-09-13

## 2023-07-30 NOTE — END OF VISIT
[Time Spent: ___ minutes] : I have spent [unfilled] minutes of time on the encounter. [FreeTextEntry3] : I, ERNA ODONNELL , am scribing for and in the presence of JOYCE STEVENSON the following sections: History of present illness, past Medical/family/surgical/family/social history, review of systems, vital signs, physical exam and disposition.  I personally performed the services described in the documentation, reviewed the documentation recorded by the scribe in my presence and it accurately and completely records my words and actions.

## 2023-07-30 NOTE — PROCEDURE
[FreeTextEntry1] : Patient was advised to view the educational video prior to this visit regarding aortic pathology, risk factors, surgical procedures, and lifestyle modifications. Video can be retrieved at [email or web address].\par

## 2023-07-30 NOTE — DATA REVIEWED
[FreeTextEntry1] : TTE 4/11/23\par 1. Normal left ventricular cavity size. The left ventricular systolic function is moderately decreased with an ejection fraction of 41 %.\par 2. Better contraction at the mid to basal LV walls: anterolateral, anterior and the posterolateral.\par 3. Moderate left ventricular hypertrophy.\par 4. There is severe (grade 3) left ventricular diastolic dysfunction.\par 5. Normal right ventricular cavity size and probably normal systolic function.\par 6. Severe biatrial enlargement.\par 7. The aortic root at sinuses of Valsalva is dilated. The proximal ascending aorta is dilated.Ao Sinus:    4.8 cm. Ao Asc prox: 4.48 cm\par 8. A mechanical valve replacement present in the aortic position. There is trace valvular regurgitation. The peak transaortic velocity is 1.46 m/s, peak transaortic gradient is 8.5 mmHg and mean transaortic gradient is 4.6 mmHg with an LVOT/aortic valve VTI ratio of 0.53. The aortic valve area is estimated at 3.56 cm? by the continuity equation. Aortic valve acceleration time is 73 msec.\par 9. Mild tricuspid regurgitation.\par 10. No echocardiographic evidence of pulmonary hypertension.\par 11. No pericardial effusion seen.

## 2023-07-30 NOTE — PHYSICAL EXAM
[General Appearance - Alert] : alert [General Appearance - In No Acute Distress] : in no acute distress [Sclera] : the sclera and conjunctiva were normal [Outer Ear] : the ears and nose were normal in appearance [Oropharynx] : the oropharynx was normal [Neck Appearance] : the appearance of the neck was normal [Neck Cervical Mass (___cm)] : no neck mass was observed [Jugular Venous Distention Increased] : there was no jugular-venous distention [Auscultation Breath Sounds / Voice Sounds] : lungs were clear to auscultation bilaterally [Heart Rate And Rhythm] : heart rate was normal and rhythm regular [Heart Sounds] : normal S1 and S2 [Heart Sounds Gallop] : no gallops [Examination Of The Chest] : the chest was normal in appearance [Chest Visual Inspection Thoracic Asymmetry] : no chest asymmetry [Diminished Respiratory Excursion] : normal chest expansion [No Abnormalities] : the abdominal aorta was not enlarged and no bruit was heard [Bowel Sounds] : normal bowel sounds [Abdomen Soft] : soft [Abdomen Tenderness] : non-tender [Abdomen Mass (___ Cm)] : no abdominal mass palpated [Abnormal Walk] : normal gait [Nail Clubbing] : no clubbing  or cyanosis of the fingernails [Musculoskeletal - Swelling] : no joint swelling seen [Motor Tone] : muscle strength and tone were normal [Skin Color & Pigmentation] : normal skin color and pigmentation [Skin Turgor] : normal skin turgor [] : no rash [2+] : left 2+ [No CVA Tenderness] : no ~M costovertebral angle tenderness [No Focal Deficits] : no focal deficits [Oriented To Time, Place, And Person] : oriented to person, place, and time [Left Carotid Bruit] : no bruit heard over the left carotid [Right Carotid Bruit] : no bruit heard over the right carotid [FreeTextEntry1] : deferred

## 2023-07-30 NOTE — ASSESSMENT
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 \par \par CT chest without contrast 3/16/23\par aortic root 5cm\par ascending aorta 4.5cm.\par descending aorta 3.3cm. \par \par \par I have reviewed the patient's medical records, diagnostic images during the time of this office consultation and have made the following recommendation. CT and TTE was completed of the thoracic aorta. The report was reviewed and noted in the chart, I independently reviewed and interpreted images and report and I reviewed the films/CD and agree.  Review of the imaging shows his  aortic pathology has remained stable and does not require surgical intervention. \par \par Plan\par \par - Patient does not require a follow up in [facility]. Patient can follow up with his cardiologist Dr. Olivas. \par - Continue medication regimen.\par - Follow up with cardiologist and PCP.\par - Blood pressure management.\par - Discussed signs and symptoms that warrant emergency medical attention.\par

## 2023-07-30 NOTE — HISTORY OF PRESENT ILLNESS
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 \par \par CT chest without contrast 3/16/23\par aortic root 5cm\par ascending aorta 4.5cm.\par descending aorta 3.3cm. \par \par Patient is seen in the office accompanied with his daughter who is a physician and assisted him with language translation. Patient is overall doing well.

## 2023-08-23 ENCOUNTER — APPOINTMENT (OUTPATIENT)
Dept: HEART AND VASCULAR | Facility: CLINIC | Age: 83
End: 2023-08-23
Payer: OTHER MISCELLANEOUS

## 2023-08-23 VITALS
DIASTOLIC BLOOD PRESSURE: 64 MMHG | WEIGHT: 174.5 LBS | HEART RATE: 65 BPM | SYSTOLIC BLOOD PRESSURE: 142 MMHG | BODY MASS INDEX: 24.43 KG/M2 | TEMPERATURE: 98.6 F | OXYGEN SATURATION: 97 % | HEIGHT: 71 IN

## 2023-08-23 PROCEDURE — 93306 TTE W/DOPPLER COMPLETE: CPT

## 2023-09-26 ENCOUNTER — APPOINTMENT (OUTPATIENT)
Dept: HEART AND VASCULAR | Facility: CLINIC | Age: 83
End: 2023-09-26
Payer: OTHER MISCELLANEOUS

## 2023-09-27 ENCOUNTER — NON-APPOINTMENT (OUTPATIENT)
Age: 83
End: 2023-09-27

## 2023-09-27 PROCEDURE — 93296 REM INTERROG EVL PM/IDS: CPT

## 2023-09-27 PROCEDURE — 93295 DEV INTERROG REMOTE 1/2/MLT: CPT

## 2023-10-03 ENCOUNTER — HOSPITAL ENCOUNTER (OUTPATIENT)
Dept: CT IMAGING | Facility: CLINIC | Age: 83
Discharge: HOME | End: 2023-10-03
Payer: MEDICARE

## 2023-10-03 DIAGNOSIS — R52 PAIN: ICD-10-CM

## 2023-10-03 PROCEDURE — G1004 CDSM NDSC: HCPCS

## 2023-10-03 PROCEDURE — 73200 CT UPPER EXTREMITY W/O DYE: CPT

## 2023-10-11 ENCOUNTER — APPOINTMENT (OUTPATIENT)
Dept: HEART AND VASCULAR | Facility: CLINIC | Age: 83
End: 2023-10-11

## 2023-11-09 ENCOUNTER — EVALUATION (OUTPATIENT)
Dept: PHYSICAL THERAPY | Facility: CLINIC | Age: 83
End: 2023-11-09
Payer: MEDICARE

## 2023-11-09 VITALS — SYSTOLIC BLOOD PRESSURE: 170 MMHG | DIASTOLIC BLOOD PRESSURE: 69 MMHG

## 2023-11-09 DIAGNOSIS — M79.604 RIGHT LEG PAIN: ICD-10-CM

## 2023-11-09 DIAGNOSIS — R26.89 BALANCE PROBLEM: Primary | ICD-10-CM

## 2023-11-09 DIAGNOSIS — M54.2 CERVICALGIA: ICD-10-CM

## 2023-11-09 DIAGNOSIS — M54.9 DORSALGIA: ICD-10-CM

## 2023-11-09 PROCEDURE — 97161 PT EVAL LOW COMPLEX 20 MIN: CPT

## 2023-11-09 PROCEDURE — 97110 THERAPEUTIC EXERCISES: CPT

## 2023-11-09 NOTE — PROGRESS NOTES
PT Evaluation     Today's date: 2023  Patient name: Phil Grigsby  : 1940  MRN: [id number]  Referring provider: Daron Gayle MD  Dx:   Encounter Diagnosis     ICD-10-CM    1. Balance problem  R26.89       2. Right leg pain  M79.604       3. Cervicalgia  M54.2       4. Dorsalgia  M54.9           Start Time: 1504  Stop Time: 1547  Total time in clinic (min): 43 minutes    Assessment  Assessment details: Problem List:  1) poor balance  2) hip weakness  3) limited hip mobility  4) scar hypomobility    Phil Grigsby is a pleasant 79 y.o. male who presents with balance deficits and hypomobility of a scar from healed wound that occurred about 2 years ago. He has poor balance, B hip weakness especially with extension, limited R hip motion, scar hypomobility and activity intolerance resulting in the pain he is experiencing and fear of not being able to keep active. No further referral appears necessary at this time based upon examination results. I expect he will improve with balance training. Positive prognostic indicators include absence of observed red flags. Negative prognostic indicators include chronicity of symptoms. Gave patient bridges, standing hip extension, and STS for HEP. Patient would benefit from skilled PT services to address these deficits and return to PLOF.     Comparable signs:  1) R hip figure 4 position  2) Tandem stance  3) hip ext MMT  Impairments: abnormal gait, abnormal muscle firing, abnormal or restricted ROM, activity intolerance, impaired balance, impaired physical strength, lacks appropriate home exercise program, pain with function and weight-bearing intolerance    Goals  STGs  Patient will be independent with HEP in 4 weeks  Patient will decrease pain by 2 points in 4 weeks  LTGs  Patient will increase R hip extension MMT by 1 score in 8 weeks  Patient will increase L hip extension MMT by 1 score in 8 weeks  Patient will be able to maintain L tandem stance for 10 seconds in 8 weeks  Patient will be able to maintain R tandem stance for 10 seconds in 8 weeks  Patient will improve TUG time by 5 seconds in 8 weeks    Plan  Patient would benefit from: skilled physical therapy  Planned therapy interventions: home exercise program, therapeutic exercise, therapeutic activities, joint mobilization, manual therapy, motor coordination training, nerve gliding, neuromuscular re-education, patient education, strengthening, IASTM, massage, abdominal trunk stabilization, balance, flexibility, functional ROM exercises and gait training  Frequency: 2x week  Duration in visits: 16  Duration in weeks: 8  Plan of Care beginning date: 2023  Plan of Care expiration date: 2024  Treatment plan discussed with: patient        Subjective Evaluation    History of Present Illness  Mechanism of injury: Patient is assisted with subjective via son as  on phone.   His son is a doctor who would like him to focus on improving balance and scar mobility of wound on distal R shank    Pain location: P1: R distal anterior shank  P2: R lateral thigh  Pain severity:   Aggs: stairs, walking  Eases: sitting  Irritability: moderate  JEFERSON/timeline: 2 years ago patient was walking when he lost his balance and his leg hit a swing on his porch causing open wound  Red flags: none  PMH/PSH: ICD defibrillator, pacemaker    Patient Goals  Patient goals for therapy: independence with ADLs/IADLs and decreased pain    Pain  Current pain ratin  At best pain ratin  At worst pain ratin          Objective     Neurological Testing     Reflexes   Left   Patellar (L4): normal (2+)  Achilles (S1): normal (2+)    Right   Patellar (L4): normal (2+)  Achilles (S1): normal (2+)    Active Range of Motion     Lumbar   Flexion: 75 degrees  Restriction level: minimal  Extension: 50 degrees  Restriction level: moderate  Left lateral flexion:  WFL  Right lateral flexion:  WFL  Left rotation: 50 degrees Restriction level: moderate  Right rotation: 50 degrees  Restriction level: moderate    Strength/Myotome Testing     Left Hip   Planes of Motion   Flexion: 4+  Extension: 3  Abduction: 4    Right Hip   Planes of Motion   Flexion: 4  Extension: 3  Abduction: 3+    Left Knee   Flexion: 4+  Extension: 5    Right Knee   Flexion: 4+  Extension: 4+    Left Ankle/Foot   Dorsiflexion: 5    Right Ankle/Foot   Dorsiflexion: 5    Ambulation     Ambulation: Level Surfaces   Ambulation without assistive device: independent    Additional Level Surfaces Ambulation Details  Wide base of support, increase ML sway    General Comments:      Lumbar Comments  5xSTS=12.14"  R tandem= 3"  L tandem= 5"  STS- poor glute activation with increased trunk flexion  P2 recreated with figure 4 position  Scar hypomobility             Precautions: COPD, ICD defibrillator, pacemaker, FALLS RISK  POC expires Unit limit Auth Expiration date PT/OT/ST + Visit Limit?   1/4/23 N/a pend BOMN                           Visit/Unit Tracking  AUTH Status:  Date 11/9              pend Used 1               Remaining                      Foto Not given            Manuals 11/9            R hip ER mob             Scar IASTM NV                                      Neuro Re-Ed             Semi tandem stance             Tandem stance             Dynamic balance             Bridges HEP                                                   Ther Ex             Standing hip ext HEP            Prone hip ext             SL hip ABD             Mini squat             Leg press                                       Bike             Ther Activity             STS HEP            Step up/down             Gait Training                                       Modalities

## 2023-11-09 NOTE — LETTER
November 10, 2023    Sheree Gurrola MD  [address] [place] [address]    Patient: Portillo Odonnell   YOB: 1940   Date of Visit: 2023     Encounter Diagnosis     ICD-10-CM    1. Balance problem  R26.89       2. Right leg pain  M79.604       3. Cervicalgia  M54.2       4. Dorsalgia  M54.9           Dear Dr. Freeman Go: Thank you for your recent referral of Portillo Odonnell. Please review the attached evaluation summary from [id number] recent visit. Please verify that you agree with the plan of care by signing the attached order. If you have any questions or concerns, please do not hesitate to call. I sincerely appreciate the opportunity to share in the care of one of your patients and hope to have another opportunity to work with you in the near future. Sincerely,    Yasmany Melton, PT      Referring Provider:      I certify that I have read the below Plan of Care and certify the need for these services furnished under this plan of treatment while under my care. Sheree Gurrola MD  [address] [place] [address]  Via Fax: [phone]          PT Evaluation     Today's date: 2023  Patient name: Portillo Odonnell  : 1940  MRN: [id number]  Referring provider: Sheree Gurrola MD  Dx:   Encounter Diagnosis     ICD-10-CM    1. Balance problem  R26.89       2. Right leg pain  M79.604       3. Cervicalgia  M54.2       4. Dorsalgia  M54.9           Start Time: 1504  Stop Time: 1547  Total time in clinic (min): 43 minutes    Assessment  Assessment details: Problem List:  1) poor balance  2) hip weakness  3) limited hip mobility  4) scar hypomobility    Portillo Odonnell is a pleasant 79 y.o. male who presents with balance deficits and hypomobility of a scar from healed wound that occurred about 2 years ago.   He has poor balance, B hip weakness especially with extension, limited R hip motion, scar hypomobility and activity intolerance resulting in the pain he is experiencing and fear of not being able to keep active. No further referral appears necessary at this time based upon examination results. I expect he will improve with balance training. Positive prognostic indicators include absence of observed red flags. Negative prognostic indicators include chronicity of symptoms. Gave patient bridges, standing hip extension, and STS for HEP. Patient would benefit from skilled PT services to address these deficits and return to PLOF.     Comparable signs:  1) R hip figure 4 position  2) Tandem stance  3) hip ext MMT  Impairments: abnormal gait, abnormal muscle firing, abnormal or restricted ROM, activity intolerance, impaired balance, impaired physical strength, lacks appropriate home exercise program, pain with function and weight-bearing intolerance    Goals  STGs  Patient will be independent with HEP in 4 weeks  Patient will decrease pain by 2 points in 4 weeks  LTGs  Patient will increase R hip extension MMT by 1 score in 8 weeks  Patient will increase L hip extension MMT by 1 score in 8 weeks  Patient will be able to maintain L tandem stance for 10 seconds in 8 weeks  Patient will be able to maintain R tandem stance for 10 seconds in 8 weeks  Patient will improve TUG time by 5 seconds in 8 weeks    Plan  Patient would benefit from: skilled physical therapy  Planned therapy interventions: home exercise program, therapeutic exercise, therapeutic activities, joint mobilization, manual therapy, motor coordination training, nerve gliding, neuromuscular re-education, patient education, strengthening, IASTM, massage, abdominal trunk stabilization, balance, flexibility, functional ROM exercises and gait training  Frequency: 2x week  Duration in visits: 16  Duration in weeks: 8  Plan of Care beginning date: 11/9/2023  Plan of Care expiration date: 1/4/2024  Treatment plan discussed with: patient        Subjective Evaluation    History of Present Illness  Mechanism of injury: Patient is assisted with subjective via son as  on phone.   His son is a doctor who would like him to focus on improving balance and scar mobility of wound on distal R shank    Pain location: P1: R distal anterior shank  P2: R lateral thigh  Pain severity:   Aggs: stairs, walking  Eases: sitting  Irritability: moderate  JEFERSON/timeline: 2 years ago patient was walking when he lost his balance and his leg hit a swing on his porch causing open wound  Red flags: none  PMH/PSH: ICD defibrillator, pacemaker    Patient Goals  Patient goals for therapy: independence with ADLs/IADLs and decreased pain    Pain  Current pain ratin  At best pain ratin  At worst pain ratin          Objective     Neurological Testing     Reflexes   Left   Patellar (L4): normal (2+)  Achilles (S1): normal (2+)    Right   Patellar (L4): normal (2+)  Achilles (S1): normal (2+)    Active Range of Motion     Lumbar   Flexion: 75 degrees  Restriction level: minimal  Extension: 50 degrees  Restriction level: moderate  Left lateral flexion:  WFL  Right lateral flexion:  WFL  Left rotation: 50 degrees  Restriction level: moderate  Right rotation: 50 degrees  Restriction level: moderate    Strength/Myotome Testing     Left Hip   Planes of Motion   Flexion: 4+  Extension: 3  Abduction: 4    Right Hip   Planes of Motion   Flexion: 4  Extension: 3  Abduction: 3+    Left Knee   Flexion: 4+  Extension: 5    Right Knee   Flexion: 4+  Extension: 4+    Left Ankle/Foot   Dorsiflexion: 5    Right Ankle/Foot   Dorsiflexion: 5    Ambulation     Ambulation: Level Surfaces   Ambulation without assistive device: independent    Additional Level Surfaces Ambulation Details  Wide base of support, increase ML sway    General Comments:      Lumbar Comments  5xSTS=12.14"  R tandem= 3"  L tandem= 5"  STS- poor glute activation with increased trunk flexion  P2 recreated with figure 4 position  Scar hypomobility Precautions: COPD, ICD defibrillator, pacemaker, FALLS RISK  POC expires Unit limit Auth Expiration date PT/OT/ST + Visit Limit?   1/4/23 N/a pend BOMN                           Visit/Unit Tracking  AUTH Status:  Date 11/9              pend Used 1               Remaining                      Foto Not given            Manuals 11/9            R hip ER mob             Scar IASTM NV                                      Neuro Re-Ed             Semi tandem stance             Tandem stance             Dynamic balance             Bridges HEP                                                   Ther Ex             Standing hip ext HEP            Prone hip ext             SL hip ABD             Mini squat             Leg press                                       Bike             Ther Activity             STS HEP            Step up/down             Gait Training                                       Modalities normal...

## 2023-11-13 ENCOUNTER — APPOINTMENT (OUTPATIENT)
Dept: PHYSICAL THERAPY | Facility: CLINIC | Age: 83
End: 2023-11-13
Payer: MEDICARE

## 2023-11-22 ENCOUNTER — OFFICE VISIT (OUTPATIENT)
Dept: PHYSICAL THERAPY | Facility: CLINIC | Age: 83
End: 2023-11-22
Payer: MEDICARE

## 2023-11-22 DIAGNOSIS — M79.604 RIGHT LEG PAIN: ICD-10-CM

## 2023-11-22 DIAGNOSIS — M54.9 DORSALGIA: ICD-10-CM

## 2023-11-22 DIAGNOSIS — M54.2 CERVICALGIA: ICD-10-CM

## 2023-11-22 DIAGNOSIS — R26.89 BALANCE PROBLEM: Primary | ICD-10-CM

## 2023-11-22 PROCEDURE — 97140 MANUAL THERAPY 1/> REGIONS: CPT

## 2023-11-22 PROCEDURE — 97112 NEUROMUSCULAR REEDUCATION: CPT

## 2023-11-22 PROCEDURE — 97110 THERAPEUTIC EXERCISES: CPT

## 2023-11-22 NOTE — PROGRESS NOTES
Daily Note     Today's date: 2023  Patient name: Rich Anguiano  : 1940  MRN: [id number]  Referring provider: Faith Alejandra MD  Dx:   Encounter Diagnosis     ICD-10-CM    1. Balance problem  R26.89       2. Right leg pain  M79.604       3. Cervicalgia  M54.2       4. Dorsalgia  M54.9                      Subjective: Pt reports his RLE pain is "8-15/10" most days but reports today it is "10-11/10". Objective: See treatment diary below      Assessment: Pt experiences pain and decreased ability to perform transitional movements (bridging, supine<>sidelying) and requires therapist assist.  Therapist correction required with LOB during static balance activities. PT demonstrates decreased proprioception. He presents with an improvement in hip extension during STS transfers today. Plan: Continue per plan of care.       Precautions: COPD, ICD defibrillator, pacemaker, FALLS RISK  POC expires Unit limit Auth Expiration date PT/OT/ST + Visit Limit?   23 N/a pend BOMN                           Visit/Unit Tracking  AUTH Status:  Date              pend Used 1 2              Remaining                      Foto Not given            Manuals            R hip ER mob             Scar IASTM NV STM AF                                     Neuro Re-Ed             Semi tandem stance             Tandem stance  15"x3           Dynamic balance             Bridges HEP 2x5           NBOS EO on foam  15"x3, EC 10"x2                                     Ther Ex             Pt education  AF           Standing hip ext HEP            Prone hip ext             SL hip ABD  Stand 2x10           Mini squat             Leg press             Seated hip abd  GTB x20                        Bike             Ther Activity             STS HEP 2x5           Step up/down  NV           Gait Training                                       Modalities

## 2023-11-27 ENCOUNTER — APPOINTMENT (OUTPATIENT)
Dept: PHYSICAL THERAPY | Facility: CLINIC | Age: 83
End: 2023-11-27
Payer: MEDICARE

## 2023-12-01 ENCOUNTER — APPOINTMENT (OUTPATIENT)
Dept: PHYSICAL THERAPY | Facility: CLINIC | Age: 83
End: 2023-12-01
Payer: MEDICARE

## 2023-12-04 ENCOUNTER — APPOINTMENT (OUTPATIENT)
Dept: PHYSICAL THERAPY | Facility: CLINIC | Age: 83
End: 2023-12-04
Payer: MEDICARE

## 2023-12-08 ENCOUNTER — APPOINTMENT (OUTPATIENT)
Dept: PHYSICAL THERAPY | Facility: CLINIC | Age: 83
End: 2023-12-08
Payer: MEDICARE

## 2023-12-11 ENCOUNTER — APPOINTMENT (OUTPATIENT)
Dept: PHYSICAL THERAPY | Facility: CLINIC | Age: 83
End: 2023-12-11
Payer: MEDICARE

## 2023-12-15 ENCOUNTER — APPOINTMENT (OUTPATIENT)
Dept: PHYSICAL THERAPY | Facility: CLINIC | Age: 83
End: 2023-12-15
Payer: MEDICARE

## 2023-12-18 ENCOUNTER — OFFICE VISIT (OUTPATIENT)
Dept: PHYSICAL THERAPY | Facility: CLINIC | Age: 83
End: 2023-12-18
Payer: MEDICARE

## 2023-12-18 DIAGNOSIS — M54.9 DORSALGIA: ICD-10-CM

## 2023-12-18 DIAGNOSIS — R26.89 BALANCE PROBLEM: Primary | ICD-10-CM

## 2023-12-18 DIAGNOSIS — M54.2 CERVICALGIA: ICD-10-CM

## 2023-12-18 DIAGNOSIS — M79.604 RIGHT LEG PAIN: ICD-10-CM

## 2023-12-18 PROCEDURE — 97140 MANUAL THERAPY 1/> REGIONS: CPT

## 2023-12-18 PROCEDURE — 97110 THERAPEUTIC EXERCISES: CPT

## 2023-12-18 PROCEDURE — 97530 THERAPEUTIC ACTIVITIES: CPT

## 2023-12-18 NOTE — LETTER
2023    Berna Jansen MD  [address] [facility] [address]    Patient: Aniceto Ferreira   YOB: 1940   Date of Visit: 2023     Encounter Diagnosis     ICD-10-CM    1. Balance problem  R26.89       2. Right leg pain  M79.604       3. Cervicalgia  M54.2       4. Dorsalgia  M54.9           Dear Dr. Jansen:    Thank you for your recent referral of Aniceto Ferreira. Please review the attached evaluation summary from Aniceto's recent visit.     Please verify that you agree with the plan of care by signing the attached order.     If you have any questions or concerns, please do not hesitate to call.     I sincerely appreciate the opportunity to share in the care of one of your patients and hope to have another opportunity to work with you in the near future.       Sincerely,    Alexander Lizarraga, PT      Referring Provider:      I certify that I have read the below Plan of Care and certify the need for these services furnished under this plan of treatment while under my care.                    Berna Jansen MD  [address] [facility] [address]  Via Fax: [phone]          PT Re-Evaluation     Today's date: 2023  Patient name: Aniceto Ferreira  : 1940  MRN: [id number]  Referring provider: Berna Jansen MD  Dx:   Encounter Diagnosis     ICD-10-CM    1. Balance problem  R26.89       2. Right leg pain  M79.604       3. Cervicalgia  M54.2       4. Dorsalgia  M54.9           Start Time: 1428  Stop Time: 1509  Total time in clinic (min): 41 minutes    Assessment  Assessment details: Problem List:  1) poor balance  2) hip weakness  3) limited hip mobility  4) scar hypomobility    Aniceto Ferreira is a pleasant 83 y.o. male who presents with balance deficits and hypomobility of a scar from healed wound that occurred about 2 years ago.  There has been no change in PT status since initial evaluation due to patient getting sick and missing several weeks of  therapy.  He has poor balance, B hip weakness especially with extension, limited R hip motion, scar hypomobility and activity intolerance resulting in the pain he is experiencing and fear of not being able to keep active.  No further referral appears necessary at this time based upon examination results.  I expect he will improve with balance training.  PGave patient bridges, standing hip extension, and STS for HEP.  Patient would benefit from skilled PT services to address these deficits and return to PLOF.    Comparable signs:  1) R hip figure 4 position  2) Tandem stance  3) hip ext MMT  Impairments: abnormal gait, abnormal muscle firing, abnormal or restricted ROM, activity intolerance, impaired balance, impaired physical strength, lacks appropriate home exercise program, pain with function and weight-bearing intolerance    Goals  STGs  Patient will be independent with HEP in 4 weeks -not met  Patient will decrease pain by 2 points in 4 weeks -not met  LTGs  Patient will increase R hip extension MMT by 1 score in 8 weeks  Patient will increase L hip extension MMT by 1 score in 8 weeks  Patient will be able to maintain L tandem stance for 10 seconds in 8 weeks  Patient will be able to maintain R tandem stance for 10 seconds in 8 weeks  Patient will improve TUG time by 5 seconds in 8 weeks    Plan  Patient would benefit from: skilled physical therapy  Planned therapy interventions: home exercise program, therapeutic exercise, therapeutic activities, joint mobilization, manual therapy, motor coordination training, nerve gliding, neuromuscular re-education, patient education, strengthening, IASTM, massage, abdominal trunk stabilization, balance, flexibility, functional ROM exercises and gait training  Frequency: 2x week  Duration in visits: 12  Duration in weeks: 6  Plan of Care beginning date: 12/18/2023  Plan of Care expiration date: 1/29/2024  Treatment plan discussed with: patient        Subjective  "Evaluation    History of Present Illness  Mechanism of injury: Patient is assisted with subjective via son as  on phone.  His son is a doctor who would like him to focus on improving balance and scar mobility of wound on distal R shank    Pain location: P1: R distal anterior shank  P2: R lateral thigh  Pain severity:   Aggs: stairs, walking  Eases: sitting  Irritability: moderate  JEFERSON/timeline: 2 years ago patient was walking when he lost his balance and his leg hit a swing on his porch causing open wound  Red flags: none  PMH/PSH: ICD defibrillator, pacemaker    - patient reports no change since starting PT due to getting sick    Patient Goals  Patient goals for therapy: independence with ADLs/IADLs and decreased pain    Pain  Current pain ratin  At best pain ratin  At worst pain rating: 10          Objective     Neurological Testing     Reflexes   Left   Patellar (L4): normal (2+)  Achilles (S1): normal (2+)    Right   Patellar (L4): normal (2+)  Achilles (S1): normal (2+)    Active Range of Motion     Lumbar   Flexion: 75 degrees  Restriction level: minimal  Extension: 50 degrees  Restriction level: moderate  Left lateral flexion:  WFL  Right lateral flexion:  WFL  Left rotation: 50 degrees  Restriction level: moderate  Right rotation: 50 degrees  Restriction level: moderate    Strength/Myotome Testing     Left Hip   Planes of Motion   Flexion: 4+  Extension: 3  Abduction: 4    Right Hip   Planes of Motion   Flexion: 4  Extension: 3  Abduction: 3+    Left Knee   Flexion: 4+  Extension: 5    Right Knee   Flexion: 4+  Extension: 4+    Left Ankle/Foot   Dorsiflexion: 5    Right Ankle/Foot   Dorsiflexion: 5    Ambulation     Ambulation: Level Surfaces   Ambulation without assistive device: independent    Additional Level Surfaces Ambulation Details  Wide base of support, increase ML sway    General Comments:      Lumbar Comments  5xSTS=12.14\"  R tandem= 3\"  L tandem= 5\"  STS- poor glute " "activation with increased trunk flexion  P2 recreated with figure 4 position  Scar hypomobility             Precautions: COPD, ICD defibrillator, pacemaker, FALLS RISK    POC expires Unit limit Auth Expiration date PT/OT/ST + Visit Limit?   1/29/23 N/a 12/31/23 BOMN                               Foto Not given            Manuals 11/9 11/22 12/18          R hip ER mob             Scar IASTM NV STM AF GS                                    Neuro Re-Ed             Semi tandem stance   3x15\"          Tandem stance  15\"x3           Dynamic balance             Bridges HEP 2x5           NBOS EO on foam  15\"x3, EC 10\"x2                                     Ther Ex             Pt education  AF           Standing hip ext HEP  2x10  rtb          Prone hip ext             SL hip ABD  Stand 2x10 Stand  2x10  rtb          Mini squat             Leg press             Seated hip abd  GTB x20 Gtb  x20          Standing hip ABD             Bike             Ther Activity             STS HEP 2x5 3x5          Step up/down  NV X10 ea          Gait Training                                       Modalities                                                              "

## 2023-12-18 NOTE — PROGRESS NOTES
PT Re-Evaluation     Today's date: 2023  Patient name: Aniceto Ferreira  : 1940  MRN: [id number]  Referring provider: Berna Jansen MD  Dx:   Encounter Diagnosis     ICD-10-CM    1. Balance problem  R26.89       2. Right leg pain  M79.604       3. Cervicalgia  M54.2       4. Dorsalgia  M54.9           Start Time: 1428  Stop Time: 1509  Total time in clinic (min): 41 minutes    Assessment  Assessment details: Problem List:  1) poor balance  2) hip weakness  3) limited hip mobility  4) scar hypomobility    Aniceto Ferreira is a pleasant 83 y.o. male who presents with balance deficits and hypomobility of a scar from healed wound that occurred about 2 years ago.  There has been no change in PT status since initial evaluation due to patient getting sick and missing several weeks of therapy.  He has poor balance, B hip weakness especially with extension, limited R hip motion, scar hypomobility and activity intolerance resulting in the pain he is experiencing and fear of not being able to keep active.  No further referral appears necessary at this time based upon examination results.  I expect he will improve with balance training.  PGave patient bridges, standing hip extension, and STS for HEP.  Patient would benefit from skilled PT services to address these deficits and return to PLOF.    Comparable signs:  1) R hip figure 4 position  2) Tandem stance  3) hip ext MMT  Impairments: abnormal gait, abnormal muscle firing, abnormal or restricted ROM, activity intolerance, impaired balance, impaired physical strength, lacks appropriate home exercise program, pain with function and weight-bearing intolerance    Goals  STGs  Patient will be independent with HEP in 4 weeks -not met  Patient will decrease pain by 2 points in 4 weeks -not met  LTGs  Patient will increase R hip extension MMT by 1 score in 8 weeks  Patient will increase L hip extension MMT by 1 score in 8 weeks  Patient will be able to  maintain L tandem stance for 10 seconds in 8 weeks  Patient will be able to maintain R tandem stance for 10 seconds in 8 weeks  Patient will improve TUG time by 5 seconds in 8 weeks    Plan  Patient would benefit from: skilled physical therapy  Planned therapy interventions: home exercise program, therapeutic exercise, therapeutic activities, joint mobilization, manual therapy, motor coordination training, nerve gliding, neuromuscular re-education, patient education, strengthening, IASTM, massage, abdominal trunk stabilization, balance, flexibility, functional ROM exercises and gait training  Frequency: 2x week  Duration in visits: 12  Duration in weeks: 6  Plan of Care beginning date: 2023  Plan of Care expiration date: 2024  Treatment plan discussed with: patient        Subjective Evaluation    History of Present Illness  Mechanism of injury: Patient is assisted with subjective via son as  on phone.  His son is a doctor who would like him to focus on improving balance and scar mobility of wound on distal R shank    Pain location: P1: R distal anterior shank  P2: R lateral thigh  Pain severity:   Aggs: stairs, walking  Eases: sitting  Irritability: moderate  JEFERSON/timeline: 2 years ago patient was walking when he lost his balance and his leg hit a swing on his porch causing open wound  Red flags: none  PMH/PSH: ICD defibrillator, pacemaker    - patient reports no change since starting PT due to getting sick    Patient Goals  Patient goals for therapy: independence with ADLs/IADLs and decreased pain    Pain  Current pain ratin  At best pain ratin  At worst pain rating: 10          Objective     Neurological Testing     Reflexes   Left   Patellar (L4): normal (2+)  Achilles (S1): normal (2+)    Right   Patellar (L4): normal (2+)  Achilles (S1): normal (2+)    Active Range of Motion     Lumbar   Flexion: 75 degrees  Restriction level: minimal  Extension: 50 degrees  Restriction level:  "moderate  Left lateral flexion:  WFL  Right lateral flexion:  WFL  Left rotation: 50 degrees  Restriction level: moderate  Right rotation: 50 degrees  Restriction level: moderate    Strength/Myotome Testing     Left Hip   Planes of Motion   Flexion: 4+  Extension: 3  Abduction: 4    Right Hip   Planes of Motion   Flexion: 4  Extension: 3  Abduction: 3+    Left Knee   Flexion: 4+  Extension: 5    Right Knee   Flexion: 4+  Extension: 4+    Left Ankle/Foot   Dorsiflexion: 5    Right Ankle/Foot   Dorsiflexion: 5    Ambulation     Ambulation: Level Surfaces   Ambulation without assistive device: independent    Additional Level Surfaces Ambulation Details  Wide base of support, increase ML sway    General Comments:      Lumbar Comments  5xSTS=12.14\"  R tandem= 3\"  L tandem= 5\"  STS- poor glute activation with increased trunk flexion  P2 recreated with figure 4 position  Scar hypomobility             Precautions: COPD, ICD defibrillator, pacemaker, FALLS RISK    POC expires Unit limit Auth Expiration date PT/OT/ST + Visit Limit?   1/29/23 N/a 12/31/23 BOMN                               Foto Not given            Manuals 11/9 11/22 12/18          R hip ER mob             Scar IASTM NV STM AF GS                                    Neuro Re-Ed             Semi tandem stance   3x15\"          Tandem stance  15\"x3           Dynamic balance             Bridges HEP 2x5           NBOS EO on foam  15\"x3, EC 10\"x2                                     Ther Ex             Pt education  AF           Standing hip ext HEP  2x10  rtb          Prone hip ext             SL hip ABD  Stand 2x10 Stand  2x10  rtb          Mini squat             Leg press             Seated hip abd  GTB x20 Gtb  x20          Standing hip ABD             Bike             Ther Activity             STS HEP 2x5 3x5          Step up/down  NV X10 ea          Gait Training                                       Modalities                                              "

## 2023-12-22 ENCOUNTER — OFFICE VISIT (OUTPATIENT)
Dept: PHYSICAL THERAPY | Facility: CLINIC | Age: 83
End: 2023-12-22
Payer: MEDICARE

## 2023-12-22 DIAGNOSIS — R26.89 BALANCE PROBLEM: Primary | ICD-10-CM

## 2023-12-22 DIAGNOSIS — M54.2 CERVICALGIA: ICD-10-CM

## 2023-12-22 DIAGNOSIS — M79.604 RIGHT LEG PAIN: ICD-10-CM

## 2023-12-22 DIAGNOSIS — M54.9 DORSALGIA: ICD-10-CM

## 2023-12-22 PROCEDURE — 97530 THERAPEUTIC ACTIVITIES: CPT

## 2023-12-22 PROCEDURE — 97140 MANUAL THERAPY 1/> REGIONS: CPT

## 2023-12-22 PROCEDURE — 97112 NEUROMUSCULAR REEDUCATION: CPT

## 2023-12-22 PROCEDURE — 97110 THERAPEUTIC EXERCISES: CPT

## 2023-12-22 NOTE — PROGRESS NOTES
"Daily Note     Today's date: 2023  Patient name: Aniceto Ferreira  : 1940  MRN: [id number]  Referring provider: Berna Jansen MD  Dx:   Encounter Diagnosis     ICD-10-CM    1. Balance problem  R26.89       2. Right leg pain  M79.604       3. Dorsalgia  M54.9       4. Cervicalgia  M54.2           Start Time: 1426  Stop Time: 1519  Total time in clinic (min): 53 minutes    Subjective: Patient reports that he only slept 3 hours last night due to significant pain.      Objective: See treatment diary below      Assessment: Patient demonstrates improving LE strength this session as he is able to perform STS for 10 repetitions without UE support.  Patient completed today's session without increase in pain.  IASTM performed again as patient reported temporary pain relief with it last session.  Patient called daughter for  at end of session.  Daughter was inquiring about different types of massage creams in order to mobilize scar tissue at home.  I informed daughter and patient that I do not anticipate much change in scar mobility or pain from prior wound as injury occurred 2 years ago.  Future sessions should continue to progress IASTM and strengthening exercises as able. Tolerated treatment fair. Patient would benefit from continued PT      Plan: Continue per plan of care.      Precautions: COPD, ICD defibrillator, pacemaker, FALLS RISK    POC expires Unit limit Auth Expiration date PT/OT/ST + Visit Limit?   23 N/a 23 BOMN                               Foto Not given            Manuals          R hip ER mob             Scar IASTM NV STM AF GS GS                                   Neuro Re-Ed             Semi tandem stance   3x15\" 3x20\"         Tandem stance  15\"x3           Dynamic balance             Bridges HEP 2x5           NBOS EO on foam  15\"x3, EC 10\"x2           Rhomberg on foam    3x15\"                      Ther Ex             Pt education  AF         " "  Standing hip ext HEP  2x10  rtb 2x10  rtb         Prone hip ext             SL hip ABD  Stand 2x10 Stand  2x10  rtb Stand  2x10  rtb         Mini squat             Leg press             Seated hip abd  GTB x20 Gtb  x20          Standing hip ABD             Pt and caregiver edu    GS- scar massage, prognosis         Bike             Ther Activity             STS HEP 2x5 3x5 X5  x10         Mini squat    x20         Step up/down  NV X10 ea X10 ea  6\"         Gait Training                                       Modalities                                              "

## 2023-12-28 ENCOUNTER — NON-APPOINTMENT (OUTPATIENT)
Age: 83
End: 2023-12-28

## 2023-12-28 ENCOUNTER — APPOINTMENT (OUTPATIENT)
Dept: HEART AND VASCULAR | Facility: CLINIC | Age: 83
End: 2023-12-28
Payer: OTHER MISCELLANEOUS

## 2023-12-28 ENCOUNTER — OFFICE VISIT (OUTPATIENT)
Dept: PHYSICAL THERAPY | Facility: CLINIC | Age: 83
End: 2023-12-28
Payer: MEDICARE

## 2023-12-28 DIAGNOSIS — M79.604 RIGHT LEG PAIN: ICD-10-CM

## 2023-12-28 DIAGNOSIS — R26.89 BALANCE PROBLEM: Primary | ICD-10-CM

## 2023-12-28 DIAGNOSIS — M54.2 CERVICALGIA: ICD-10-CM

## 2023-12-28 DIAGNOSIS — M54.9 DORSALGIA: ICD-10-CM

## 2023-12-28 PROCEDURE — 93296 REM INTERROG EVL PM/IDS: CPT

## 2023-12-28 PROCEDURE — 93295 DEV INTERROG REMOTE 1/2/MLT: CPT

## 2023-12-28 PROCEDURE — 97530 THERAPEUTIC ACTIVITIES: CPT

## 2023-12-28 PROCEDURE — 97140 MANUAL THERAPY 1/> REGIONS: CPT

## 2023-12-28 PROCEDURE — 97110 THERAPEUTIC EXERCISES: CPT

## 2023-12-28 NOTE — PROGRESS NOTES
"Daily Note     Today's date: 2023  Patient name: Aniceto Ferreira  : 1940  MRN: [id number]  Referring provider: Berna Jansen MD  Dx:   Encounter Diagnosis     ICD-10-CM    1. Balance problem  R26.89       2. Right leg pain  M79.604       3. Dorsalgia  M54.9       4. Cervicalgia  M54.2           Start Time: 1500  Stop Time: 1543  Total time in clinic (min): 43 minutes    Subjective: Patient reports that he only got 1 hour of sleep last night due to significant pain coming from his scar.  Patient states that his leg felt good after the IASTM last time for about 1 hour.      Objective: See treatment diary below      Assessment: Patient demonstrates improving LE strength and balance this session.  Patient completed today's session without increase in pain.  IASTM performed in order to improve scar mobility.  Patient is able to maintain semi tandem stance on foam for first time today.  Progressed strengthening exercises against gravity today.  Future sessions should continue to progress strengthening exercises as able. Tolerated treatment fair. Patient would benefit from continued PT      Plan: Continue per plan of care.      Precautions: COPD, ICD defibrillator, pacemaker, FALLS RISK    POC expires Unit limit Auth Expiration date PT/OT/ST + Visit Limit?   23 N/a 23 BOMN                               Foto Not given            Manuals         R hip ER mob             Scar IASTM NV STM AF GS GS GS                                  Neuro Re-Ed             Semi tandem stance   3x15\" 3x20\" 3x20\" on foam        Tandem stance  15\"x3           Dynamic balance             Bridges HEP 2x5           NBOS EO on foam  15\"x3, EC 10\"x2           Rhomberg on foam    3x15\"                      Ther Ex             Pt education  AF           Standing hip ext HEP  2x10  rtb 2x10  rtb 2x10  gtb        Prone hip ext             SL hip ABD  Stand 2x10 Stand  2x10  rtb Stand  2x10  rtb " "2x10 ea        Prone hip ext     2x10        SLR     2x10        Leg press             Seated hip abd  GTB x20 Gtb  x20  Btb  x30        Standing hip ABD     2x10  gtb        Pt and caregiver edu    GS- scar massage, prognosis         Bike             Ther Activity             STS HEP 2x5 3x5 X5  x10 2x10        Mini squat    x20 x20        Step up/down  NV X10 ea X10 ea  6\" X10 ea  6\"        Gait Training                                       Modalities                                                "

## 2024-01-02 ENCOUNTER — OFFICE VISIT (OUTPATIENT)
Dept: PHYSICAL THERAPY | Facility: CLINIC | Age: 84
End: 2024-01-02
Payer: MEDICARE

## 2024-01-02 DIAGNOSIS — M79.604 RIGHT LEG PAIN: ICD-10-CM

## 2024-01-02 DIAGNOSIS — M54.9 DORSALGIA: ICD-10-CM

## 2024-01-02 DIAGNOSIS — R26.89 BALANCE PROBLEM: Primary | ICD-10-CM

## 2024-01-02 DIAGNOSIS — M54.2 CERVICALGIA: ICD-10-CM

## 2024-01-02 PROCEDURE — 97112 NEUROMUSCULAR REEDUCATION: CPT

## 2024-01-02 PROCEDURE — 97530 THERAPEUTIC ACTIVITIES: CPT

## 2024-01-02 PROCEDURE — 97140 MANUAL THERAPY 1/> REGIONS: CPT

## 2024-01-02 NOTE — PROGRESS NOTES
"Daily Note     Today's date: 2024  Patient name: Aniceto Ferreira  : 1940  MRN: [id number]  Referring provider: Berna Jansen MD  Dx:   Encounter Diagnosis     ICD-10-CM    1. Balance problem  R26.89       2. Right leg pain  M79.604       3. Dorsalgia  M54.9       4. Cervicalgia  M54.2           Start Time: 1431  Stop Time: 1517  Total time in clinic (min): 46 minutes    Subjective: Patient reports that IASTM provides relief for 1 hour only.      Objective: See treatment diary below      Assessment: Patient demonstrates consistent LE strength and balance this session.  Patient completed today's session without increase in pain.  IASTM performed in order to improve scar mobility and pain relief.  Patient has pain from R hip to scar on R shank, but does not appear to be referred from low back with no change in symptoms with lumbar ROM.  Shank pain increases with all LE motion.  Difficulty to assess response due to language barrier.  Future sessions should continue to progress strengthening and balance exercises as able. Tolerated treatment fair. Patient would benefit from continued PT        Plan: Continue per plan of care.      Precautions: COPD, ICD defibrillator, pacemaker, FALLS RISK    POC expires Unit limit Auth Expiration date PT/OT/ST + Visit Limit?   23 N/a 23 BOMN                               Foto Not given            Manuals        R hip ER mob             Scar IASTM NV STM AF GS GS GS GS                                 Neuro Re-Ed             Semi tandem stance   3x15\" 3x20\" 3x20\" on foam 3x30\" on foam       Tandem stance  15\"x3           Tandem walk      1 lap w gait belt       Bridges HEP 2x5           NBOS EO on foam  15\"x3, EC 10\"x2           Rhomberg on foam    3x15\"                      Ther Ex             Pt education  AF           Standing hip ext HEP  2x10  rtb 2x10  rtb 2x10  gtb 2x10  gtb       Prone hip ext             SL hip ABD  " "Stand 2x10 Stand  2x10  rtb Stand  2x10  rtb 2x10 ea        Prone hip ext     2x10        SLR     2x10 2x10       Leg press             Seated hip abd  GTB x20 Gtb  x20  Btb  x30 Btb x30       Standing hip ABD     2x10  gtb 2x10  gtb       Pt and caregiver edu    GS- scar massage, prognosis         Bike             Ther Activity             STS HEP 2x5 3x5 X5  x10 2x10 2x10       Mini squat    x20 x20 x20       Step up/down  NV X10 ea X10 ea  6\" X10 ea  6\" X20 ea 6\"       Gait Training                                       Modalities                                                  "

## 2024-01-04 ENCOUNTER — OFFICE VISIT (OUTPATIENT)
Dept: PHYSICAL THERAPY | Facility: CLINIC | Age: 84
End: 2024-01-04
Payer: MEDICARE

## 2024-01-04 DIAGNOSIS — M54.9 DORSALGIA: ICD-10-CM

## 2024-01-04 DIAGNOSIS — R26.89 BALANCE PROBLEM: Primary | ICD-10-CM

## 2024-01-04 DIAGNOSIS — M79.604 RIGHT LEG PAIN: ICD-10-CM

## 2024-01-04 DIAGNOSIS — M54.2 CERVICALGIA: ICD-10-CM

## 2024-01-04 PROCEDURE — 97112 NEUROMUSCULAR REEDUCATION: CPT

## 2024-01-04 PROCEDURE — 97110 THERAPEUTIC EXERCISES: CPT

## 2024-01-04 PROCEDURE — 97530 THERAPEUTIC ACTIVITIES: CPT

## 2024-01-04 NOTE — PROGRESS NOTES
"Daily Note     Today's date: 2024  Patient name: Aniceto Ferreira  : 1940  MRN: [id number]  Referring provider: Berna Jansen MD  Dx:   Encounter Diagnosis     ICD-10-CM    1. Balance problem  R26.89       2. Right leg pain  M79.604       3. Dorsalgia  M54.9       4. Cervicalgia  M54.2                      Subjective: Pt reports no change in R distal LE pain.  He states that his RLE continues to be painful with WB and he experiences relief of pain for approximately 30 min following PT session.      Objective: See treatment diary below      Assessment: Continued scar tissue mobility deficits, although improvements noted.  Patient demonstrates improved hip extension with STS transfers.  He requires VC to maintain upright posture during standing hip strengthening.  CGA provided with occasional hand rail assist during tandem gait and lateral walking without hand rail. Still requires close supervision during step ups secondary to decreased overall endurance.  Pt would benefit from continued PT in order to improve balance and strength for increased safety during daily activities.        Plan: Continue per plan of care.      Precautions: COPD, ICD defibrillator, pacemaker, FALLS RISK    POC expires Unit limit Auth Expiration date PT/OT/ST + Visit Limit?   23 N/a 23 BOMN                               Foto Not given            Manuals       R hip ER mob             Scar IASTM NV STM AF GS GS GS GS AF                                Neuro Re-Ed             Semi tandem stance   3x15\" 3x20\" 3x20\" on foam 3x30\" on foam       Tandem stance  15\"x3     Tandem gait c gait belt 4 laps      Tandem walk      1 lap w gait belt 3 laps c gait belt no UE      Bridges HEP 2x5           NBOS EO on foam  15\"x3, EC 10\"x2     EC 10\"x2      Rhomberg on foam    3x15\"   C perturbations 1'                   Ther Ex             Pt education  AF           Standing hip ext HEP  2x10  rtb " "2x10  rtb 2x10  gtb 2x10  gtb GTB 2x10      Prone hip ext             SL hip ABD  Stand 2x10 Stand  2x10  rtb Stand  2x10  rtb 2x10 ea        Prone hip ext     2x10        SLR     2x10 2x10       Leg press             Seated hip abd  GTB x20 Gtb  x20  Btb  x30 Btb x30 BTB x30      Standing hip ABD     2x10  gtb 2x10  gtb GTB 2x10      Pt and caregiver edu    GS- scar massage, prognosis         Bike             Ther Activity             STS HEP 2x5 3x5 X5  x10 2x10 2x10 2x10      Mini squat    x20 x20 x20 x20      Step up/down  NV X10 ea X10 ea  6\" X10 ea  6\" X20 ea 6\" X20 ea 6\"      Gait Training                                       Modalities                                                    "

## 2024-01-09 ENCOUNTER — OFFICE VISIT (OUTPATIENT)
Dept: PHYSICAL THERAPY | Facility: CLINIC | Age: 84
End: 2024-01-09
Payer: MEDICARE

## 2024-01-09 DIAGNOSIS — R26.89 BALANCE PROBLEM: Primary | ICD-10-CM

## 2024-01-09 DIAGNOSIS — M79.604 RIGHT LEG PAIN: ICD-10-CM

## 2024-01-09 DIAGNOSIS — M54.2 CERVICALGIA: ICD-10-CM

## 2024-01-09 DIAGNOSIS — M54.9 DORSALGIA: ICD-10-CM

## 2024-01-09 PROCEDURE — 97112 NEUROMUSCULAR REEDUCATION: CPT

## 2024-01-09 PROCEDURE — 97140 MANUAL THERAPY 1/> REGIONS: CPT

## 2024-01-09 PROCEDURE — 97530 THERAPEUTIC ACTIVITIES: CPT

## 2024-01-09 NOTE — PROGRESS NOTES
"Daily Note     Today's date: 2024  Patient name: Aniceto Ferreira  : 1940  MRN: [id number]  Referring provider: Berna Jansen MD  Dx:   Encounter Diagnosis     ICD-10-CM    1. Balance problem  R26.89       2. Dorsalgia  M54.9       3. Right leg pain  M79.604       4. Cervicalgia  M54.2           Start Time: 1445  Stop Time: 1529  Total time in clinic (min): 44 minutes    Subjective: Patient reports that scar mobility is improved since starting PT.      Objective: See treatment diary below      Assessment: Patient demonstrates improving balance this session.  Patient completed today's session without increase in pain.  IASTM performed in order to improve scar mobility.  Dynamic balance is improving as patient is able to perform tandem walking with less reliance on support from gait belt.  Attempt tandem stance next session.  Future sessions should continue to progress balance and strengthening exercises as able. Tolerated treatment well. Patient would benefit from continued PT      Plan: Continue per plan of care.      Precautions: COPD, ICD defibrillator, pacemaker, FALLS RISK    POC expires Unit limit Auth Expiration date PT/OT/ST + Visit Limit?   23 N/a 23 BOMN                               Foto Not given            Manuals      R hip ER mob             Scar IASTM NV STM AF GS GS GS GS AF GS                               Neuro Re-Ed             Semi tandem stance   3x15\" 3x20\" 3x20\" on foam 3x30\" on foam  3x30\"on foam     Tandem stance  15\"x3     Tandem gait c gait belt 4 laps      Tandem walk      1 lap w gait belt 3 laps c gait belt no UE 2 laps w gait belt     Bridges HEP 2x5           NBOS EO on foam  15\"x3, EC 10\"x2     EC 10\"x2      Rhomberg on foam    3x15\"   C perturbations 1' W perturbation 1'                  Ther Ex             Pt education  AF           Standing hip ext HEP  2x10  rtb 2x10  rtb 2x10  gtb 2x10  gtb GTB 2x10      Prone " "hip ext             SL hip ABD  Stand 2x10 Stand  2x10  rtb Stand  2x10  rtb 2x10 ea        Prone hip ext     2x10        SLR     2x10 2x10       Leg press             Seated hip abd  GTB x20 Gtb  x20  Btb  x30 Btb x30 BTB x30      Standing hip ABD     2x10  gtb 2x10  gtb GTB 2x10      Side stepping at mirror        BTB 3 laps     Pt and caregiver edu    GS- scar massage, prognosis         Bike             Ther Activity             STS HEP 2x5 3x5 X5  x10 2x10 2x10 2x10      Mini squat    x20 x20 x20 x20 x20     Sled push        4 laps  100#     Step up/down  NV X10 ea X10 ea  6\" X10 ea  6\" X20 ea 6\" X20 ea 6\" X20  Ea 8\"     Gait Training                                       Modalities                                                      "

## 2024-01-12 ENCOUNTER — OFFICE VISIT (OUTPATIENT)
Dept: PHYSICAL THERAPY | Facility: CLINIC | Age: 84
End: 2024-01-12
Payer: MEDICARE

## 2024-01-12 DIAGNOSIS — M54.2 CERVICALGIA: ICD-10-CM

## 2024-01-12 DIAGNOSIS — M79.604 RIGHT LEG PAIN: ICD-10-CM

## 2024-01-12 DIAGNOSIS — R26.89 BALANCE PROBLEM: Primary | ICD-10-CM

## 2024-01-12 DIAGNOSIS — M54.9 DORSALGIA: ICD-10-CM

## 2024-01-12 PROCEDURE — 97112 NEUROMUSCULAR REEDUCATION: CPT

## 2024-01-12 PROCEDURE — 97140 MANUAL THERAPY 1/> REGIONS: CPT

## 2024-01-12 PROCEDURE — 97110 THERAPEUTIC EXERCISES: CPT

## 2024-01-12 NOTE — PROGRESS NOTES
"Daily Note     Today's date: 2024  Patient name: Aniceto Ferreira  : 1940  MRN: [id number]  Referring provider: Berna Jansen MD  Dx:   Encounter Diagnosis     ICD-10-CM    1. Balance problem  R26.89       2. Dorsalgia  M54.9       3. Right leg pain  M79.604       4. Cervicalgia  M54.2           Start Time: 1500  Stop Time: 1557  Total time in clinic (min): 57 minutes    Subjective: Patient reports that he is in a lot of pain today.  He informs he that he has been practicing the tandem walking at home with the assistance of his table.      Objective: See treatment diary below      Assessment: Patient demonstrates improving BLE strength and balance this session.  Patient completed today's session without increase in pain.  IASTM performed in order to improve scar mobility.  Educated patient that he should not perform tandem walking at home unassisted due to safety concern.  He still requires gait belt assistance in PT in order to prevent falling.  Spoke to patient's son in regard to end of therapy.  Patient has achieved maximum benefit from PT at this time and we will transition to HEP over the next week.  Future sessions should continue to progress strengthening and balance exercises as able. Tolerated treatment well. Patient would benefit from continued PT      Plan: Continue per plan of care.      Precautions: COPD, ICD defibrillator, pacemaker, FALLS RISK    POC expires Unit limit Auth Expiration date PT/OT/ST + Visit Limit?   23 N/a 23 BOMN                               Foto Not given            Manuals     R hip ER mob             Scar IASTM NV STM AF GS GS GS GS AF GS GS                              Neuro Re-Ed             Semi tandem stance   3x15\" 3x20\" 3x20\" on foam 3x30\" on foam  3x30\"on foam 3x30\" on foam      Tandem stance  15\"x3     Tandem gait c gait belt 4 laps      Tandem walk      1 lap w gait belt 3 laps c gait belt no UE 2 " "laps w gait belt 3 laps w gait belt    Bridges HEP 2x5           NBOS EO on foam  15\"x3, EC 10\"x2     EC 10\"x2      Rhomberg on foam    3x15\"   C perturbations 1' W perturbation 1' W perturbation  3x1'                 Ther Ex             Pt education  AF           Standing hip ext HEP  2x10  rtb 2x10  rtb 2x10  gtb 2x10  gtb GTB 2x10  BTB  2x10    Prone hip ext             SL hip ABD  Stand 2x10 Stand  2x10  rtb Stand  2x10  rtb 2x10 ea        Prone hip ext     2x10        SLR     2x10 2x10       Leg press             Seated hip abd  GTB x20 Gtb  x20  Btb  x30 Btb x30 BTB x30      Standing hip ABD     2x10  gtb 2x10  gtb GTB 2x10  BTB  2x10    Side stepping at mirror        BTB 3 laps BTB  3 laps    Pt and caregiver edu    GS- scar massage, prognosis         Bike             Ther Activity             STS HEP 2x5 3x5 X5  x10 2x10 2x10 2x10  x20    Mini squat    x20 x20 x20 x20 x20 x20    Sled push        4 laps  100#     Step up/down  NV X10 ea X10 ea  6\" X10 ea  6\" X20 ea 6\" X20 ea 6\" X20  Ea 8\" X20 ea  8\"    Gait Training                                       Modalities                                                        "

## 2024-01-15 ENCOUNTER — OFFICE VISIT (OUTPATIENT)
Dept: PHYSICAL THERAPY | Facility: CLINIC | Age: 84
End: 2024-01-15
Payer: MEDICARE

## 2024-01-15 DIAGNOSIS — R26.89 BALANCE PROBLEM: Primary | ICD-10-CM

## 2024-01-15 DIAGNOSIS — M79.604 RIGHT LEG PAIN: ICD-10-CM

## 2024-01-15 DIAGNOSIS — M54.9 DORSALGIA: ICD-10-CM

## 2024-01-15 DIAGNOSIS — M54.2 CERVICALGIA: ICD-10-CM

## 2024-01-15 PROCEDURE — 97140 MANUAL THERAPY 1/> REGIONS: CPT

## 2024-01-15 PROCEDURE — 97112 NEUROMUSCULAR REEDUCATION: CPT

## 2024-01-15 PROCEDURE — 97110 THERAPEUTIC EXERCISES: CPT

## 2024-01-15 NOTE — PROGRESS NOTES
"Daily Note     Today's date: 1/15/2024  Patient name: Aniceto Ferreira  : 1940  MRN: [id number]  Referring provider: Berna Jansen MD  Dx:   Encounter Diagnosis     ICD-10-CM    1. Balance problem  R26.89       2. Dorsalgia  M54.9       3. Cervicalgia  M54.2       4. Right leg pain  M79.604           Start Time: 1431  Stop Time: 1510  Total time in clinic (min): 39 minutes    Subjective: Patient reports that his scar is very painful today.      Objective: See treatment diary below      Assessment: Patient demonstrates consistent balance and strength this session.  Patient completed today's session without increase in pain.  IASTM performed in order to improve scar mobility.  Patient is no longer making significant improvements with PT and should be transitioned to HEP for maintenance next session. Tolerated treatment well. Patient would benefit from continued PT      Plan: Continue per plan of care.      Precautions: COPD, ICD defibrillator, pacemaker, FALLS RISK    POC expires Unit limit Auth Expiration date PT/OT/ST + Visit Limit?   23 N/a 23 BOMN                               Foto Not given            Manuals 11/9 11/22 12/18 12/22 12/28 1/2 1/4 1/9 1/12 1/15   R hip ER mob             Scar IASTM NV STM AF GS GS GS GS AF GS GS GS                             Neuro Re-Ed             Semi tandem stance   3x15\" 3x20\" 3x20\" on foam 3x30\" on foam  3x30\"on foam 3x30\" on foam   3x30\" on foam w UE support   Tandem stance  15\"x3     Tandem gait c gait belt 4 laps      Tandem walk      1 lap w gait belt 3 laps c gait belt no UE 2 laps w gait belt 3 laps w gait belt 3 laps w gait belt   Bridges HEP 2x5           NBOS EO on foam  15\"x3, EC 10\"x2     EC 10\"x2      Rhomberg on foam    3x15\"   C perturbations 1' W perturbation 1' W perturbation  3x1' W perturbation  3x1'                Ther Ex             Pt education  AF           Standing hip ext HEP  2x10  rtb 2x10  rtb 2x10  gtb 2x10  gtb GTB 2x10  " "BTB  2x10 BTB  2x10   Prone hip ext             SL hip ABD  Stand 2x10 Stand  2x10  rtb Stand  2x10  rtb 2x10 ea        Prone hip ext     2x10        SLR     2x10 2x10       Leg press             Seated hip abd  GTB x20 Gtb  x20  Btb  x30 Btb x30 BTB x30      Standing hip ABD     2x10  gtb 2x10  gtb GTB 2x10  BTB  2x10 BTB  2x10   Side stepping at mirror        BTB 3 laps BTB  3 laps BTB  5 laps   Pt and caregiver edu    GS- scar massage, prognosis         Bike             Ther Activity             STS HEP 2x5 3x5 X5  x10 2x10 2x10 2x10  x20 x20   Mini squat    x20 x20 x20 x20 x20 x20 x20   Sled push        4 laps  100#     Step up/down  NV X10 ea X10 ea  6\" X10 ea  6\" X20 ea 6\" X20 ea 6\" X20  Ea 8\" X20 ea  8\" X20 ea  8\"   Gait Training                                       Modalities                                                          "

## 2024-01-19 ENCOUNTER — APPOINTMENT (OUTPATIENT)
Dept: PHYSICAL THERAPY | Facility: CLINIC | Age: 84
End: 2024-01-19
Payer: MEDICARE

## 2024-02-15 ENCOUNTER — APPOINTMENT (OUTPATIENT)
Dept: PHYSICAL THERAPY | Facility: CLINIC | Age: 84
End: 2024-02-15
Payer: MEDICARE

## 2024-03-01 ENCOUNTER — OFFICE VISIT (OUTPATIENT)
Dept: PHYSICAL THERAPY | Facility: CLINIC | Age: 84
End: 2024-03-01
Payer: MEDICARE

## 2024-03-01 DIAGNOSIS — M79.604 RIGHT LEG PAIN: ICD-10-CM

## 2024-03-01 DIAGNOSIS — R26.89 BALANCE PROBLEMS: Primary | ICD-10-CM

## 2024-03-01 PROCEDURE — 97110 THERAPEUTIC EXERCISES: CPT

## 2024-03-01 PROCEDURE — 97140 MANUAL THERAPY 1/> REGIONS: CPT

## 2024-03-01 PROCEDURE — 97112 NEUROMUSCULAR REEDUCATION: CPT

## 2024-03-01 NOTE — PROGRESS NOTES
PT Discharge    Today's date: 3/1/2024  Patient name: Aniceto Ferreira  : 1940  MRN: [id number]  Referring provider: Berna Jansen MD  Dx:   Encounter Diagnosis     ICD-10-CM    1. Balance problems  R26.89       2. Right leg pain  M79.604             Start Time: 1415  Stop Time: 1458  Total time in clinic (min): 43 minutes    Assessment  Assessment details: Aniceto Ferreira is a pleasant 83 y.o. male who presents with balance deficits and hypomobility of a scar from healed wound that occurred about 2 years ago.  With PT, he has made minimal progress in identified impairments.  Lumbar ROM remains limited, balance deficits remain, no significant improvements in strength, and pain is unchanged.  Due to lack of progress and lack of anticipated progress, patient should be discharged to HEP.  Discussed potential for patient to return to PT in ~2 months for more of a maintenance program to maintain current level of function with patient and family.  Patient should be discharged to HEP for maintenance at this time.  Encouraged patient to contact me with any questions or concerns in the future.        Goals  STGs  Patient will be independent with HEP in 4 weeks -met  Patient will decrease pain by 2 points in 4 weeks -not met  LTGs  Patient will increase R hip extension MMT by 1 score in 8 weeks -not met  Patient will increase L hip extension MMT by 1 score in 8 weeks -not met  Patient will be able to maintain L tandem stance for 10 seconds in 8 weeks -not met  Patient will be able to maintain R tandem stance for 10 seconds in 8 weeks -not met    Plan  Plan details: D/c to HEP  Treatment plan discussed with: patient and family        Subjective Evaluation    History of Present Illness  Mechanism of injury: Patient is assisted with subjective via son as  on phone.  His son is a doctor who would like him to focus on improving balance and scar mobility of wound on distal R shank    Pain location: P1:  "R distal anterior shank  P2: R lateral thigh  Pain severity:   Aggs: stairs, walking  Eases: sitting  Irritability: moderate  JEFERSON/timeline: 2 years ago patient was walking when he lost his balance and his leg hit a swing on his porch causing open wound  Red flags: none  PMH/PSH: ICD defibrillator, pacemaker    Patient Goals  Patient goals for therapy: independence with ADLs/IADLs and decreased pain    Pain  Current pain ratin  At best pain ratin  At worst pain ratin          Objective     Neurological Testing     Reflexes   Left   Patellar (L4): normal (2+)  Achilles (S1): normal (2+)    Right   Patellar (L4): normal (2+)  Achilles (S1): normal (2+)    Active Range of Motion     Lumbar   Flexion: 75 degrees  Restriction level: minimal  Extension: 50 degrees  Restriction level: moderate  Left lateral flexion:  WFL  Right lateral flexion:  WFL  Left rotation: 50 degrees  Restriction level: moderate  Right rotation: 50 degrees  Restriction level: moderate    Strength/Myotome Testing     Left Hip   Planes of Motion   Flexion: 4+  Extension: 3  Abduction: 4    Right Hip   Planes of Motion   Flexion: 4  Extension: 3  Abduction: 3+    Left Knee   Flexion: 4+  Extension: 5    Right Knee   Flexion: 4+  Extension: 4+    Left Ankle/Foot   Dorsiflexion: 5    Right Ankle/Foot   Dorsiflexion: 5    Ambulation     Ambulation: Level Surfaces   Ambulation without assistive device: independent    Additional Level Surfaces Ambulation Details  Wide base of support, increase ML sway    General Comments:      Lumbar Comments  5xSTS=12.14\"  R tandem= 3\"  L tandem= 5\"               Precautions: COPD, ICD defibrillator, pacemaker, FALLS RISK    POC expires Unit limit Auth Expiration date PT/OT/ST + Visit Limit?   23 N/a 23 BOMN                               Foto             Manuals 3/1 11/22 12/18 12/22 12/28 1/2 1/4 1/9 1/12 1/15   R hip ER mob             Scar IASTM GS STM AF GS GS GS GS AF GS GS GS                   " "          Neuro Re-Ed             Semi tandem stance 3x30\" on foam  3x15\" 3x20\" 3x20\" on foam 3x30\" on foam  3x30\"on foam 3x30\" on foam   3x30\" on foam w UE support   Tandem stance  15\"x3     Tandem gait c gait belt 4 laps      Tandem walk 2 laps w gait belt     1 lap w gait belt 3 laps c gait belt no UE 2 laps w gait belt 3 laps w gait belt 3 laps w gait belt   Bridges  2x5           NBOS EO on foam  15\"x3, EC 10\"x2     EC 10\"x2      Rhomberg on foam    3x15\"   C perturbations 1' W perturbation 1' W perturbation  3x1' W perturbation  3x1'                Ther Ex             Pt education  AF           Standing hip ext 2x10  gtb  2x10  rtb 2x10  rtb 2x10  gtb 2x10  gtb GTB 2x10  BTB  2x10 BTB  2x10   Prone hip ext             SL hip ABD  Stand 2x10 Stand  2x10  rtb Stand  2x10  rtb 2x10 ea        Prone hip ext     2x10        SLR     2x10 2x10       Leg press             Seated hip abd  GTB x20 Gtb  x20  Btb  x30 Btb x30 BTB x30      Standing hip ABD 2x10  gtb    2x10  gtb 2x10  gtb GTB 2x10  BTB  2x10 BTB  2x10   Side stepping at mirror        BTB 3 laps BTB  3 laps BTB  5 laps   PT re eval GS            Pt and caregiver edu    GS- scar massage, prognosis         Bike             Ther Activity             STS x20 2x5 3x5 X5  x10 2x10 2x10 2x10  x20 x20   Mini squat 2x10   x20 x20 x20 x20 x20 x20 x20   Sled push        4 laps  100#     Step up/down 2x10  6\" NV X10 ea X10 ea  6\" X10 ea  6\" X20 ea 6\" X20 ea 6\" X20  Ea 8\" X20 ea  8\" X20 ea  8\"   Gait Training                                       Modalities                                            "

## 2024-03-27 ENCOUNTER — NON-APPOINTMENT (OUTPATIENT)
Age: 84
End: 2024-03-27

## 2024-03-28 ENCOUNTER — APPOINTMENT (OUTPATIENT)
Dept: HEART AND VASCULAR | Facility: CLINIC | Age: 84
End: 2024-03-28
Payer: MEDICARE

## 2024-03-28 PROCEDURE — 93296 REM INTERROG EVL PM/IDS: CPT

## 2024-03-28 PROCEDURE — 93295 DEV INTERROG REMOTE 1/2/MLT: CPT

## 2024-04-10 ENCOUNTER — APPOINTMENT (OUTPATIENT)
Dept: HEART AND VASCULAR | Facility: CLINIC | Age: 84
End: 2024-04-10
Payer: MEDICARE

## 2024-04-10 VITALS
WEIGHT: 174 LBS | OXYGEN SATURATION: 98 % | SYSTOLIC BLOOD PRESSURE: 148 MMHG | BODY MASS INDEX: 24.36 KG/M2 | HEIGHT: 71 IN | HEART RATE: 67 BPM | DIASTOLIC BLOOD PRESSURE: 62 MMHG

## 2024-04-10 VITALS — DIASTOLIC BLOOD PRESSURE: 50 MMHG | SYSTOLIC BLOOD PRESSURE: 114 MMHG

## 2024-04-10 PROCEDURE — 93306 TTE W/DOPPLER COMPLETE: CPT

## 2024-04-10 PROCEDURE — 93880 EXTRACRANIAL BILAT STUDY: CPT

## 2024-05-07 ENCOUNTER — APPOINTMENT (OUTPATIENT)
Dept: HEART AND VASCULAR | Facility: CLINIC | Age: 84
End: 2024-05-07

## 2024-06-27 ENCOUNTER — NON-APPOINTMENT (OUTPATIENT)
Age: 84
End: 2024-06-27

## 2024-06-27 ENCOUNTER — APPOINTMENT (OUTPATIENT)
Dept: HEART AND VASCULAR | Facility: CLINIC | Age: 84
End: 2024-06-27
Payer: MEDICARE

## 2024-06-27 PROCEDURE — 93295 DEV INTERROG REMOTE 1/2/MLT: CPT

## 2024-06-27 PROCEDURE — 93296 REM INTERROG EVL PM/IDS: CPT

## 2024-08-29 ENCOUNTER — APPOINTMENT (OUTPATIENT)
Dept: HEART AND VASCULAR | Facility: CLINIC | Age: 84
End: 2024-08-29
Payer: MEDICARE

## 2024-08-29 ENCOUNTER — NON-APPOINTMENT (OUTPATIENT)
Age: 84
End: 2024-08-29

## 2024-08-29 VITALS
OXYGEN SATURATION: 100 % | DIASTOLIC BLOOD PRESSURE: 70 MMHG | HEART RATE: 60 BPM | TEMPERATURE: 97.1 F | WEIGHT: 166 LBS | BODY MASS INDEX: 23.24 KG/M2 | SYSTOLIC BLOOD PRESSURE: 140 MMHG | HEIGHT: 71 IN

## 2024-08-29 DIAGNOSIS — R53.1 WEAKNESS: ICD-10-CM

## 2024-08-29 DIAGNOSIS — I50.30 UNSPECIFIED DIASTOLIC (CONGESTIVE) HEART FAILURE: ICD-10-CM

## 2024-08-29 DIAGNOSIS — E78.00 PURE HYPERCHOLESTEROLEMIA, UNSPECIFIED: ICD-10-CM

## 2024-08-29 DIAGNOSIS — I71.20 THORACIC AORTIC ANEURYSM, WITHOUT RUPTURE, UNSPECIFIED: ICD-10-CM

## 2024-08-29 DIAGNOSIS — I48.0 PAROXYSMAL ATRIAL FIBRILLATION: ICD-10-CM

## 2024-08-29 DIAGNOSIS — I10 ESSENTIAL (PRIMARY) HYPERTENSION: ICD-10-CM

## 2024-08-29 PROCEDURE — 93000 ELECTROCARDIOGRAM COMPLETE: CPT

## 2024-08-29 PROCEDURE — 99214 OFFICE O/P EST MOD 30 MIN: CPT

## 2024-08-29 RX ORDER — MECLIZINE HYDROCHLORIDE 25 MG/1
25 TABLET ORAL
Refills: 0 | Status: ACTIVE | COMMUNITY

## 2024-08-29 RX ORDER — LOSARTAN POTASSIUM 100 MG/1
100 TABLET, FILM COATED ORAL DAILY
Refills: 0 | Status: ACTIVE | COMMUNITY

## 2024-08-29 NOTE — HISTORY OF PRESENT ILLNESS
[FreeTextEntry1] : Cardio- Dr Yoo/April\par  Pulm- Dr Pink\par  Cardio- Dr Ames(retired)\par  EP- Dr Brown\par  Billing- Workers Comp: Mariza Huffman T [phone], F [phone]\par  Account Claims Claim # [id number]

## 2024-08-29 NOTE — ASSESSMENT
[FreeTextEntry1] : PAF- Maintaining NSR on Amio, pulmonologist in [place] follows for Amio Pulm toxicity EKG has complete heart block today. pacer is single lead. Will probably do nothing differently as pt feels well.   Left sided weakness- exam is suspicious for a CVA, no bleed on CT.  Would run INR 2.5 to 3.5.  Neuro consult.  Increase Lipitor to 40 mg, consider OG.  Can not have MRI d/t old AICD.  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   LDL 77  Back Pain- Percocet renewed [id number]. Parking permit filled out   Insomnia- Alprazolam given, iSTOP done

## 2024-08-29 NOTE — REASON FOR VISIT
[FreeTextEntry1] : In 1993 AVR,St Javan Valve for AS with Dr Monroe, 1995 V Tach and A Fib (had a cardiac arrest). AICD placed.  Amio started in 1998 for AICD shocks.  Followed by Dr Yoo in [place]. Cardiac arrest #2 in 2003 or 2004 saved by AICD.  2015 device change. There is HFpEF but it  is compensated when in NSR.  No shocks in 3 yrs. EF is normal @ 55 by echo, 48% on Nuc  3/30/22 Doing well, no cardiac c/o, some insomnia 4/11/23  EKG with CHB, Ao 50 mm by CT, increased from 45 in 2016.  EF 50% on echo 2022 8/29/24 Mild anemia(possibly d/t hemolysis),  for GI w/u.   Increasingly  unsteady, On Decadron  for neuro issues.   A Carotid US was done 4/1/24.  Has not been able to see Neuro, no availability until January.   A CT C- had an old lacunar infarct.  EKG: NSR with 1st degree AVB. PRWP,  ST-Tw abnormalities. 9/13/18 EKG: V paced can not tell underlying atrial rhythm  3/30/22 EKG: V pacing, P waves marching through c/w CHB, 4/11/23

## 2024-08-29 NOTE — ASSESSMENT
[FreeTextEntry1] : PAF- Maintaining NSR on Amio, pulmonologist in [place] follows for Amio Pulm toxicity EKG has complete heart block today. pacer is single lead. Will probably do nothing differently as pt feels well.   Left sided weakness- exam is suspicious for a CVA, no bleed on CT.  Would run INR 2.5 to 3.5.  Neuro consult.  Increase Lipitor to 40 mg, consider OG.  Can not have MRI d/t old AICD.  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   LDL 77  Back Pain- Percocet renewed [id number]. Parking permit filled out   Insomnia- Alprazolam given, iSTOP done

## 2024-08-29 NOTE — HISTORY OF PRESENT ILLNESS
[FreeTextEntry1] : Cardio- Dr Yoo/April\par  Pulm- Dr Pink\par  Cardio- Dr Ames(retired)\par  EP- Dr Brown\par  Billing- Workers Comp: Mariza Huffman T [phone], F [phone]\par  Account Claims Claim # [id number]

## 2024-08-29 NOTE — PHYSICAL EXAM
[General Appearance - Well Developed] : well developed [Normal Appearance] : normal appearance [Well Groomed] : well groomed [General Appearance - Well Nourished] : well nourished [No Deformities] : no deformities [General Appearance - In No Acute Distress] : no acute distress [Normal Conjunctiva] : the conjunctiva exhibited no abnormalities [Eyelids - No Xanthelasma] : the eyelids demonstrated no xanthelasmas [Normal Oral Mucosa] : normal oral mucosa [No Oral Pallor] : no oral pallor [No Oral Cyanosis] : no oral cyanosis [Respiration, Rhythm And Depth] : normal respiratory rhythm and effort [Exaggerated Use Of Accessory Muscles For Inspiration] : no accessory muscle use [Auscultation Breath Sounds / Voice Sounds] : lungs were clear to auscultation bilaterally [Heart Rate And Rhythm] : heart rate and rhythm were normal [Abdomen Soft] : soft [Abdomen Tenderness] : non-tender [Abdomen Mass (___ Cm)] : no abdominal mass palpated [Skin Color & Pigmentation] : normal skin color and pigmentation [] : no rash [No Venous Stasis] : no venous stasis [Skin Lesions] : no skin lesions [No Skin Ulcers] : no skin ulcer [No Xanthoma] : no  xanthoma was observed [Oriented To Time, Place, And Person] : oriented to person, place, and time [Affect] : the affect was normal [Mood] : the mood was normal [No Anxiety] : not feeling anxious [FreeTextEntry1] :  Unable to palp all 4 distal pulses

## 2024-09-04 ENCOUNTER — APPOINTMENT (OUTPATIENT)
Dept: HEART AND VASCULAR | Facility: CLINIC | Age: 84
End: 2024-09-04
Payer: MEDICARE

## 2024-09-04 ENCOUNTER — RESULT REVIEW (OUTPATIENT)
Age: 84
End: 2024-09-04

## 2024-09-04 ENCOUNTER — APPOINTMENT (OUTPATIENT)
Dept: NEUROLOGY | Facility: CLINIC | Age: 84
End: 2024-09-04
Payer: MEDICARE

## 2024-09-04 ENCOUNTER — APPOINTMENT (OUTPATIENT)
Dept: CT IMAGING | Facility: CLINIC | Age: 84
End: 2024-09-04
Payer: MEDICARE

## 2024-09-04 ENCOUNTER — NON-APPOINTMENT (OUTPATIENT)
Age: 84
End: 2024-09-04

## 2024-09-04 ENCOUNTER — OUTPATIENT (OUTPATIENT)
Dept: OUTPATIENT SERVICES | Facility: HOSPITAL | Age: 84
LOS: 1 days | End: 2024-09-04
Payer: MEDICARE

## 2024-09-04 VITALS
WEIGHT: 165 LBS | TEMPERATURE: 96.3 F | SYSTOLIC BLOOD PRESSURE: 122 MMHG | BODY MASS INDEX: 23.1 KG/M2 | HEIGHT: 71 IN | DIASTOLIC BLOOD PRESSURE: 66 MMHG | OXYGEN SATURATION: 99 % | HEART RATE: 58 BPM

## 2024-09-04 PROBLEM — I63.9 ISCHEMIC STROKE: Status: ACTIVE | Noted: 2024-09-04

## 2024-09-04 PROCEDURE — 93306 TTE W/DOPPLER COMPLETE: CPT

## 2024-09-04 PROCEDURE — ZZZZZ: CPT | Mod: NC

## 2024-09-04 PROCEDURE — 99205 OFFICE O/P NEW HI 60 MIN: CPT

## 2024-09-04 PROCEDURE — 70496 CT ANGIOGRAPHY HEAD: CPT | Mod: 26,MH

## 2024-09-04 PROCEDURE — 70498 CT ANGIOGRAPHY NECK: CPT | Mod: 26,MH

## 2024-09-04 PROCEDURE — 70450 CT HEAD/BRAIN W/O DYE: CPT | Mod: 26,MH,XU

## 2024-09-04 PROCEDURE — 93880 EXTRACRANIAL BILAT STUDY: CPT

## 2024-09-05 PROBLEM — I63.239 CAROTID STENOSIS, SYMPTOMATIC, WITH INFARCTION: Status: ACTIVE | Noted: 2024-09-05

## 2024-09-05 RX ORDER — FUROSEMIDE 20 MG/1
20 TABLET ORAL
Qty: 42 | Refills: 3 | Status: ACTIVE | COMMUNITY
Start: 2024-09-05

## 2024-09-05 NOTE — HISTORY OF PRESENT ILLNESS
[FreeTextEntry1] : The patient is an 84 year old male with a PMH of HTN, HLD, CODP, PVD, HFpEF, AVR in 1993 (mechanical, on warfarin), cardiac arrest s/p AICD, ITP, and a fib. He is here due to new left-sided weakness and concern for stroke.   The patient is accompanied by his son and daughter.   The patient reports developing numbness without weakness from the left hand approximately 3 weeks ago.  The symptoms seem to wax and wane.  He was given a muscle relaxer by his daughter which seemed to help his symptoms.  There were no other deficits at the time.  Around 2 weeks ago, he had worsening numbness of the left arm as well as the left lower extremity.  There was also associated weakness.  He also developed blurred/doubled vision (binocular).  He had a CT of his cervical and lumbar spine which showed stable degenerative changes from 2021. HCT done in 8/22/2024 showed a R thalamic lacunar infarct Carotid US 04/2024: No significant stenosis. He has a repeat pending. He also has a repeat TTE pending. Of note, the patient has had some lability in his INR lately. It was 1.3 about a week ago (was bridge w Lovenox), 5.6 most recently. Per his children, INR is usually within goal (2.5-3.5) so was only checked once every 2 weeks or so. Labs 04/2024: LDL 53, total cholesterol 128; A1c 5.6%. INR 4.0. Given the suspicion for a stroke, the patient's daughter increased his atorvastatin to 80 mg daily from 40 mg. She also started him on dexamethasone for possible spinal stenosis vs large infarct. Notably, the patient's hemoglobin dropped recently and he was scheduled for a capsule endoscopy but due to concern of the interaction of his AICD, this was deferred. Otherwise, the patient has had a dull tension headache since this began and today developed sharp pains that radiate upwards toward his eyebrows. Tylenol has provided relief in the past.

## 2024-09-05 NOTE — PHYSICAL EXAM
[FreeTextEntry1] : Alert.  Fully oriented.  Speech and language are intact.  Cranial nerves II-XII are intact.  Motor exam reveals 4/5 strength of the L tricep and FE; otherwise strength is normal in the UE, LE. +satellite sign, drift but not completely down. Reflexes are increased throughout, L>R.  Toes are downgoing.  Sensation is intact to light touch in distal extremities.  Finger-to-nose intact on R, dysmetric on L at lest in part related to weakness. Rapid alternating movements are reduced on L.  Patient is in wheelchair. Gait assessment deferred.

## 2024-09-05 NOTE — ASSESSMENT
[FreeTextEntry1] : The patient is an 84 year old male with a PMH of HTN, HLD, CODP, PVD, HFpEF, AVR in 1993 (mechanical, on warfarin), cardiac arrest s/p AICD, ITP, and a fib. Both his history of "stuttering" symptoms and exam are consistent with the R thalamic (with minimal capsular involvement) stroke seen on recent HCT.  Given the "stuttering nature" lacunar syndrome/small vessel etiology is favored. However, I agree with plan for TTE and will also obtain repeat HCT and CTA head/neck to r/o large vessel disease and evaluate for additional ischemic events which would suggest an alternative mechanism. I would not recommended the addition of ASA 81 mg at this time give he is on Coumadin with some lability in INR lately and recent Hgb drop. I agree with increased dose of atorvastatin. PT/OT. RTC 2 months. Will call w results of CT/CTA H/N.

## 2024-09-06 ENCOUNTER — APPOINTMENT (OUTPATIENT)
Dept: VASCULAR SURGERY | Facility: CLINIC | Age: 84
End: 2024-09-06
Payer: MEDICARE

## 2024-09-06 VITALS
WEIGHT: 165 LBS | SYSTOLIC BLOOD PRESSURE: 146 MMHG | HEART RATE: 69 BPM | BODY MASS INDEX: 23.1 KG/M2 | HEIGHT: 71 IN | DIASTOLIC BLOOD PRESSURE: 80 MMHG

## 2024-09-06 DIAGNOSIS — I63.239 CEREBRAL INFARC DUE TO UNSPEC OCCLUSION OR STENOSIS OF UNSPEC CAROTID ARTERY: ICD-10-CM

## 2024-09-06 DIAGNOSIS — Z87.891 PERSONAL HISTORY OF NICOTINE DEPENDENCE: ICD-10-CM

## 2024-09-06 DIAGNOSIS — I63.9 CEREBRAL INFARCTION, UNSPECIFIED: ICD-10-CM

## 2024-09-06 PROCEDURE — 99214 OFFICE O/P EST MOD 30 MIN: CPT

## 2024-09-06 PROCEDURE — 93880 EXTRACRANIAL BILAT STUDY: CPT

## 2024-09-06 PROCEDURE — 93971 EXTREMITY STUDY: CPT

## 2024-09-09 NOTE — HISTORY OF PRESENT ILLNESS
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 Amio started in 1998 for AICD shocks. Cardiac arrest #2 in early 2000s saved by AICD. AICD device changed in 2015. He has HFpEF, [id number], insomnia, ITP, PAD (mild RLE), and scoliosis.  He presents for evaluation of carotid stenosis after recent stroke. The daughter explains about 1 month ago, he was complaining of some intermittent LUE numbness. No other symptoms. Given cervical spine issues, muscle relaxant was prescribed and symptoms improved. About a week or so later, he developed again LUE numbness but this time, it was accompanied by some weakness. The LLE was also at this time numb and weak which prompted further imaging. CT cervical lumbar showed stable degenerative changes. CT head plus CTA H/N showed R subacute to chronic infarct in occipital lobe, and small foci infacts, ?age in R thalamus and R MCA/PCA watershed. Additionally, R carotid stenosis 60-70% and carotid duplex was repeated with noted increase in velocities and stenosis since previous study in April. He presents to discuss potential need for carotid surgery. He has been seem by cards and neuro already. OG is pending to r/o potential cardioembolic source. Also, statin dose increased to 80mg daily of atorvastatin. He is not on an antiplatelet, only anticoag.    Off note, pt's daughter mentions pt had a drop in INR around May then early August. He was bridged with Lovenox. He was also noted to have a drop in Hemoglobin but it has returned to baseline. Capsule EGD was planned however, it is contraindicated given AICD in place.  There is also concern regarding his LLE to be slightly more edematous than the RLE.   Daughter is PCP, Dr Gavin. Also accompanied by son and wife.   SHx: -Former smoker -Car expert/

## 2024-09-09 NOTE — ASSESSMENT
[Arterial/Venous Disease] : arterial/venous disease [Medication Management] : medication management [FreeTextEntry1] : 83 y/o M w/ symptomatic, high grade, R ICA stenosis. Potential of also cardioembolic event however, less likely given morphology of the plaque and velocities on duplex today plus ischemic events (R subacute to chronic infarct in occipital lobe, and small foci infarcts, ?age in R thalamus and R MCA/PCA watershed). On exam, no focal neuro deficits. /80, HR 69. Reviewed all outside testing/imaging results.  Carotid duplex showed R ICA with >80% stenosis with soft plaque, velocities 501/111 cm/s. L ICA w/ <50% stenosis, velocities up to 122/38 cm/s  LLE venous duplex neg for DVT. Findings discussed with pt and family members. Discussed R CEA under cervical block and will discuss further with Dr Kelley. Risk, complications and alternatives were discussed, all questions were answered. Will complete OG and finalize cardiac optimization. He will need bridging with Lovenox and INR check preop. Will discuss addition of ASA therapy.

## 2024-09-09 NOTE — DATA REVIEWED
[FreeTextEntry1] : CTA H/N 09/2024 CT H 09/2024 Carotid 09/2024 and 04/2024 TTE 09/2024 EKG 08/2024 AICD 06/2024

## 2024-09-09 NOTE — PHYSICAL EXAM
[Respiratory Effort] : normal respiratory effort [No Rash or Lesion] : No rash or lesion [Alert] : alert [Oriented to Person] : oriented to person [Oriented to Place] : oriented to place [Oriented to Time] : oriented to time [Calm] : calm [Ankle Swelling (On Exam)] : present [Ankle Swelling On The Left] : moderate [0] : left 0 [Varicose Veins Of Lower Extremities] : not present [] : not present [FreeTextEntry1] : LEs warm to touch. LLE with mild edema below the knee to ankle when compared to RLE, no palpable cords and no calf tenderness.  [de-identified] : WN/WD [de-identified] : FROM, using rolling walker for transport/ambulation

## 2024-09-09 NOTE — PHYSICAL EXAM
[Respiratory Effort] : normal respiratory effort [No Rash or Lesion] : No rash or lesion [Alert] : alert [Oriented to Person] : oriented to person [Oriented to Place] : oriented to place [Oriented to Time] : oriented to time [Calm] : calm [Ankle Swelling (On Exam)] : present [Ankle Swelling On The Left] : moderate [0] : left 0 [Varicose Veins Of Lower Extremities] : not present [] : not present [FreeTextEntry1] : LEs warm to touch. LLE with mild edema below the knee to ankle when compared to RLE, no palpable cords and no calf tenderness.  [de-identified] : WN/WD [de-identified] : FROM, using rolling walker for transport/ambulation

## 2024-09-09 NOTE — HISTORY OF PRESENT ILLNESS
[FreeTextEntry1] : 85 y/o M w/ PMHx of HTN, HLD, GERD, COPD, ENEDINA, AVR 1993 (mechanical, [employer]avan Valve for AS with Dr Monroe - on Coumadin), cardiac arrest 2/2 V Tach and A Fib in 1995 s/p AICD placed. Amio started in 1998 for AICD shocks. Cardiac arrest #2 in early 2000s saved by AICD. AICD device changed in 2015. He has HFpEF, [id number], insomnia, ITP, PAD (mild RLE), and scoliosis.  He presents for evaluation of carotid stenosis after recent stroke. The daughter explains about 1 month ago, he was complaining of some intermittent LUE numbness. No other symptoms. Given cervical spine issues, muscle relaxant was prescribed and symptoms improved. About a week or so later, he developed again LUE numbness but this time, it was accompanied by some weakness. The LLE was also at this time numb and weak which prompted further imaging. CT cervical lumbar showed stable degenerative changes. CT head plus CTA H/N showed R subacute to chronic infarct in occipital lobe, and small foci infacts, ?age in R thalamus and R MCA/PCA watershed. Additionally, R carotid stenosis 60-70% and carotid duplex was repeated with noted increase in velocities and stenosis since previous study in April. He presents to discuss potential need for carotid surgery. He has been seem by cards and neuro already. OG is pending to r/o potential cardioembolic source. Also, statin dose increased to 80mg daily of atorvastatin. He is not on an antiplatelet, only anticoag.    Off note, pt's daughter mentions pt had a drop in INR around May then early August. He was bridged with Lovenox. He was also noted to have a drop in Hemoglobin but it has returned to baseline. Capsule EGD was planned however, it is contraindicated given AICD in place.  There is also concern regarding his LLE to be slightly more edematous than the RLE.   Daughter is PCP, Dr Gavin. Also accompanied by son and wife.   SHx: -Former smoker -Car expert/

## 2024-09-09 NOTE — PROCEDURE
[FreeTextEntry1] : Carotid duplex ordered to eval stenosis, shows: R ICA with >80% stenosis with soft plaque, velocities 501/111 cm/s. L ICA w/ <50% stenosis, velocities up to 122/38 cm/s   Venous duplex LLE ordered to r/o DVT, shows: neg DVT or SVT.

## 2024-09-09 NOTE — ASSESSMENT
[Arterial/Venous Disease] : arterial/venous disease [Medication Management] : medication management [FreeTextEntry1] : 85 y/o M w/ symptomatic, high grade, R ICA stenosis. Potential of also cardioembolic event however, less likely given morphology of the plaque and velocities on duplex today plus ischemic events (R subacute to chronic infarct in occipital lobe, and small foci infarcts, ?age in R thalamus and R MCA/PCA watershed). On exam, no focal neuro deficits. /80, HR 69. Reviewed all outside testing/imaging results.  Carotid duplex showed R ICA with >80% stenosis with soft plaque, velocities 501/111 cm/s. L ICA w/ <50% stenosis, velocities up to 122/38 cm/s  LLE venous duplex neg for DVT. Findings discussed with pt and family members. Discussed R CEA under cervical block and will discuss further with Dr Kelley. Risk, complications and alternatives were discussed, all questions were answered. Will complete OG and finalize cardiac optimization. He will need bridging with Lovenox and INR check preop. Will discuss addition of ASA therapy.

## 2024-09-11 ENCOUNTER — NON-APPOINTMENT (OUTPATIENT)
Age: 84
End: 2024-09-11

## 2024-09-11 ENCOUNTER — RESULT REVIEW (OUTPATIENT)
Age: 84
End: 2024-09-11

## 2024-09-11 ENCOUNTER — OUTPATIENT (OUTPATIENT)
Dept: OUTPATIENT SERVICES | Facility: HOSPITAL | Age: 84
LOS: 1 days | End: 2024-09-11
Payer: MEDICARE

## 2024-09-11 DIAGNOSIS — I63.9 CEREBRAL INFARCTION, UNSPECIFIED: ICD-10-CM

## 2024-09-11 DIAGNOSIS — I48.0 PAROXYSMAL ATRIAL FIBRILLATION: ICD-10-CM

## 2024-09-11 DIAGNOSIS — Z95.2 PRESENCE OF PROSTHETIC HEART VALVE: ICD-10-CM

## 2024-09-11 LAB
APTT BLD: 42.7 SEC — HIGH (ref 24.5–35.6)
INR BLD: 2.35 — HIGH (ref 0.85–1.18)
PROTHROM AB SERPL-ACNC: 26.1 SEC — HIGH (ref 9.5–13)

## 2024-09-11 PROCEDURE — 93320 DOPPLER ECHO COMPLETE: CPT | Mod: 26

## 2024-09-11 PROCEDURE — 36415 COLL VENOUS BLD VENIPUNCTURE: CPT

## 2024-09-11 PROCEDURE — 93312 ECHO TRANSESOPHAGEAL: CPT

## 2024-09-11 PROCEDURE — 93312 ECHO TRANSESOPHAGEAL: CPT | Mod: 26

## 2024-09-11 PROCEDURE — 85730 THROMBOPLASTIN TIME PARTIAL: CPT

## 2024-09-11 PROCEDURE — 93325 DOPPLER ECHO COLOR FLOW MAPG: CPT | Mod: 26

## 2024-09-11 PROCEDURE — 85610 PROTHROMBIN TIME: CPT

## 2024-09-12 ENCOUNTER — APPOINTMENT (OUTPATIENT)
Dept: HEART AND VASCULAR | Facility: CLINIC | Age: 84
End: 2024-09-12

## 2024-09-13 ENCOUNTER — APPOINTMENT (OUTPATIENT)
Dept: INTERNAL MEDICINE | Facility: CLINIC | Age: 84
End: 2024-09-13

## 2024-09-13 DIAGNOSIS — I63.9 CEREBRAL INFARCTION, UNSPECIFIED: ICD-10-CM

## 2024-09-13 DIAGNOSIS — Z51.81 ENCOUNTER FOR THERAPEUTIC DRUG LVL MONITORING: ICD-10-CM

## 2024-09-13 DIAGNOSIS — Z95.2 PRESENCE OF PROSTHETIC HEART VALVE: ICD-10-CM

## 2024-09-13 DIAGNOSIS — Z79.01 ENCOUNTER FOR THERAPEUTIC DRUG LVL MONITORING: ICD-10-CM

## 2024-09-13 DIAGNOSIS — Z79.01 LONG TERM (CURRENT) USE OF ANTICOAGULANTS: ICD-10-CM

## 2024-09-13 PROCEDURE — 99203 OFFICE O/P NEW LOW 30 MIN: CPT

## 2024-09-14 ENCOUNTER — TRANSCRIPTION ENCOUNTER (OUTPATIENT)
Age: 84
End: 2024-09-14

## 2024-09-16 ENCOUNTER — NON-APPOINTMENT (OUTPATIENT)
Age: 84
End: 2024-09-16

## 2024-09-16 ENCOUNTER — APPOINTMENT (OUTPATIENT)
Dept: RADIOLOGY | Facility: CLINIC | Age: 84
End: 2024-09-16

## 2024-09-16 ENCOUNTER — OUTPATIENT (OUTPATIENT)
Dept: OUTPATIENT SERVICES | Facility: HOSPITAL | Age: 84
LOS: 1 days | End: 2024-09-16
Payer: MEDICARE

## 2024-09-16 ENCOUNTER — APPOINTMENT (OUTPATIENT)
Dept: PULMONOLOGY | Facility: CLINIC | Age: 84
End: 2024-09-16
Payer: MEDICARE

## 2024-09-16 VITALS
OXYGEN SATURATION: 99 % | SYSTOLIC BLOOD PRESSURE: 149 MMHG | HEIGHT: 71 IN | BODY MASS INDEX: 24.5 KG/M2 | DIASTOLIC BLOOD PRESSURE: 75 MMHG | WEIGHT: 175 LBS | HEART RATE: 77 BPM

## 2024-09-16 DIAGNOSIS — G47.33 OBSTRUCTIVE SLEEP APNEA (ADULT) (PEDIATRIC): ICD-10-CM

## 2024-09-16 DIAGNOSIS — F51.04 PSYCHOPHYSIOLOGIC INSOMNIA: ICD-10-CM

## 2024-09-16 DIAGNOSIS — J44.9 CHRONIC OBSTRUCTIVE PULMONARY DISEASE, UNSPECIFIED: ICD-10-CM

## 2024-09-16 DIAGNOSIS — Z01.818 ENCOUNTER FOR OTHER PREPROCEDURAL EXAMINATION: ICD-10-CM

## 2024-09-16 PROBLEM — Z79.01 CURRENT USE OF ANTICOAGULANT THERAPY: Status: ACTIVE | Noted: 2024-09-16

## 2024-09-16 PROBLEM — Z51.81 ANTICOAGULATION GOAL OF INR 2.5 TO 3.5: Status: ACTIVE | Noted: 2024-09-16

## 2024-09-16 PROCEDURE — 71046 X-RAY EXAM CHEST 2 VIEWS: CPT | Mod: 26

## 2024-09-16 PROCEDURE — 99214 OFFICE O/P EST MOD 30 MIN: CPT

## 2024-09-16 PROCEDURE — G2211 COMPLEX E/M VISIT ADD ON: CPT

## 2024-09-16 NOTE — REVIEW OF SYSTEMS
[GERD] : gerd [Fever] : no fever [Chills] : no chills [Eye Irritation] : no eye irritation [Sinus Problems] : no sinus problems [Cough] : no cough [Hemoptysis] : no hemoptysis [Sputum] : no sputum [Dyspnea] : no dyspnea [Chest Discomfort] : no chest discomfort [Dizziness] : dizziness

## 2024-09-16 NOTE — PHYSICAL EXAM
[No Acute Distress] : no acute distress [Normal Oropharynx] : normal oropharynx [Normal Appearance] : normal appearance [No Neck Mass] : no neck mass [No Resp Distress] : no resp distress [No Clubbing] : no clubbing [No Edema] : no edema [TextBox_54] : [facility] 3/6 [TextBox_68] : crackles at bases [No Acc Muscle Use] : no acc muscle use [Oriented x3] : oriented x3 [Normal Affect] : normal affect [TextBox_99] : wheelchair but can ambulate

## 2024-09-16 NOTE — PHYSICAL EXAM
[No Acute Distress] : no acute distress [Normal Oropharynx] : normal oropharynx [Normal Appearance] : normal appearance [No Neck Mass] : no neck mass [No Resp Distress] : no resp distress [No Clubbing] : no clubbing [No Edema] : no edema [TextBox_54] : [facility] 3/6 [TextBox_68] : crackles at bases [No Acc Muscle Use] : no acc muscle use [Oriented x3] : oriented x3 [Normal Affect] : normal affect [TextBox_99] : wheelchair but can ambulate

## 2024-09-16 NOTE — HISTORY OF PRESENT ILLNESS
[Former] : former [< 20 pack-years] : < 20 pack-years [TextBox_4] : 82 year old M, former smoker, with PMHx of AVR, s/p AICD placed 1993, cardiac arrest x 2, HFpEF, HTN, pAF COPD, ENEDINA, previously being followed by a pulmonologist in [place] but is transferring care to [facility] as he is spending more time with family here. Accompanied by daughter who is helping to provide history and translation. Currently taking Stiolto 2.5-2.5, 2 puffs daily and albuterol prn. Using albuterol once daily with exertion. Was able to walk 4 blocks from [employer] [place] today. Has difficulty walking due to MSK pain in his legs. No COPD exacerbations, never hospitalized for lung issues. Is a former smoker, 1 pack to 2 packs daily x 5 years; quit 30 years ago. Is having issues with his PAP machine; states that the water no longer heats up properly. Has worsening of HF recently per cardiology and wondering if machine is functioning properly/still therapeutic. Has hoarseness in voice which he thinks may be due to this cold/dry air.  Saw ENT last month and was told exam was normal. Hoarseness is variable, sometimes better as the day goes on. Takes pantoprazole daily.   09/16/2024: Presents today for cardiac clearance for R CEA 09/23/2024, came with PAP. Transitioning to heparin for bridge (on AC for AVR). Uses BiPAP 95% of the time. Pressures set at 18 IPAP, 14 EPAP for ENEDINA. No difficulty breathing. Regarding his COPD, he is using stiolto respimat and albuterol prn. No recent hospitalization or lung infection. Procedure will be done at [facility]. Not general anesthesia, MAC.   DME: Mask: Crooks LT Machine: Aircurve 10, EPAP: 14, Pressure support 4  [TextBox_11] : 1-2 [TextBox_13] : 5 [YearQuit] : 1993 [ESS] : 2

## 2024-09-16 NOTE — ASSESSMENT
[FreeTextEntry1] : Reviewed: - CT Chest (LHR) 03/16/23: Bilateral apical pleural parenchymal thickening. Mild paraseptal emphysema and centrilobular emphysema. There are mild paraseptal emphysema and centrilobular emphysema.  - TTE 4/11/23: LVEF 41%, moderate LV hypertrophy. Severe LV diastolic dysfunction. Severe biatrial enlargement. No pulm HTN.  - PFT 06/2023: FEV1 2.57 (89%)   FVC 3.69 (91%)   FEV1/FVC 70   TLC 80%   DLCO 16.6 (66%) - OG 9/11/2024: LVEF 50-55%, normal RV size/function, no LA/RA/DL/RAA thrombus, mechanical aortic valve, normal function, trivial pericardial effusion, no PH -PAP (30 day review 9/16/24): Use 30/30, AHI 1.7, average use 9 h, leak 19L/min, RR 18, MV 3L/min.  The patient is an 82 year old M, former smoker, with PMHx of AVR, s/p AICD placed 1993, HFpEF, HTN, pAF, COPD, ENEDINA, presents today for risk optimization prior to R CEA on 9/23/2024. Adherent to PAP and LABA.LAMA inhaler, no recent hospitalizations for COPD/pneumonia.   Pulmonary risk assessment: ARISCAT score 39 (13.3%), Myers: 3.96% Mr. Seymour has a 3.96% to 13.3% risk for post-operative respiratory complications, and is optimized for the procedure from a pulmonary standpoint with the following recommendations:  Plan: - continue BiPAP 18/14, ensure that operating team is aware of settings - continue stiolto respimat LAMA/LABA or equivalent including day of procedure  - ensure inhalers and BiPAP continued after procedure - if general anesthesia is needed, ensure that patient is fully awake/upright prior to extubation - consider bringing home BiPAP mask for comfort  Risk assessment sent to PCP Kalyn Gavin [address] [facility], [address], [place], [address],  Fax: [phone]

## 2024-09-16 NOTE — ASSESSMENT
[FreeTextEntry1] : Reviewed: - CT Chest (LHR) 03/16/23: Bilateral apical pleural parenchymal thickening. Mild paraseptal emphysema and centrilobular emphysema. There are mild paraseptal emphysema and centrilobular emphysema.  - TTE 4/11/23: LVEF 41%, moderate LV hypertrophy. Severe LV diastolic dysfunction. Severe biatrial enlargement. No pulm HTN.  - PFT 06/2023: FEV1 2.57 (89%)   FVC 3.69 (91%)   FEV1/FVC 70   TLC 80%   DLCO 16.6 (66%) - OG 9/11/2024: LVEF 50-55%, normal RV size/function, no LA/RA/DL/RAA thrombus, mechanical aortic valve, normal function, trivial pericardial effusion, no PH -PAP (30 day review 9/16/24): Use 30/30, AHI 1.7, average use 9 h, leak 19L/min, RR 18, MV 3L/min.  The patient is an 82 year old M, former smoker, with PMHx of AVR, s/p AICD placed 1993, HFpEF, HTN, pAF, COPD, ENEDINA, presents today for risk optimization prior to R CEA on 9/23/2024. Adherent to PAP and LABA.LAMA inhaler, no recent hospitalizations for COPD/pneumonia.   Pulmonary risk assessment: ARISCAT score 39 (13.3%), Myers: 3.96% Mr. Seymour has a 3.96% to 13.3% risk for post-operative respiratory complications, and is optimized for the procedure from a pulmonary standpoint with the following recommendations:  Plan: - continue BiPAP 18/14, ensure that operating team is aware of settings - continue stiolto respimat LAMA/LABA or equivalent including day of procedure  - ensure inhalers and BiPAP continued after procedure - if general anesthesia is needed, ensure that patient is fully awake/upright prior to extubation - consider bringing home BiPAP mask for comfort  Risk assessment sent to PCP Kalyn Gavin [address] [facility], [address], [place], [address],  Fax: [phone]

## 2024-09-16 NOTE — CONSULT LETTER
[Dear  ___] : Dear  [unfilled], [Courtesy Letter:] : I had the pleasure of seeing your patient, [unfilled], in my office today. [Please see my note below.] : Please see my note below. [Consult Closing:] : Thank you very much for allowing me to participate in the care of this patient.  If you have any questions, please do not hesitate to contact me. [Sincerely,] : Sincerely, [FreeTextEntry3] : Quyen Wright MD  [employer] at [facility] Pulmonary, Critical Care, and Sleep Medicine

## 2024-09-16 NOTE — CONSULT LETTER
[Dear  ___] : Dear  [unfilled], [Courtesy Letter:] : I had the pleasure of seeing your patient, [unfilled], in my office today. [Please see my note below.] : Please see my note below. [Consult Closing:] : Thank you very much for allowing me to participate in the care of this patient.  If you have any questions, please do not hesitate to contact me. [Sincerely,] : Sincerely, [FreeTextEntry3] : Quyen Wright MD  [employer] at [facility] Pulmonary, Critical Care, and Sleep Medicine

## 2024-09-16 NOTE — HISTORY OF PRESENT ILLNESS
[Former] : former [< 20 pack-years] : < 20 pack-years [TextBox_4] : 82 year old M, former smoker, with PMHx of AVR, s/p AICD placed 1993, cardiac arrest x 2, HFpEF, HTN, pAF COPD, ENEDINA, previously being followed by a pulmonologist in [place] but is transferring care to [facility] as he is spending more time with family here. Accompanied by daughter who is helping to provide history and translation. Currently taking Stiolto 2.5-2.5, 2 puffs daily and albuterol prn. Using albuterol once daily with exertion. Was able to walk 4 blocks from [employer] [place] today. Has difficulty walking due to MSK pain in his legs. No COPD exacerbations, never hospitalized for lung issues. Is a former smoker, 1 pack to 2 packs daily x 5 years; quit 30 years ago. Is having issues with his PAP machine; states that the water no longer heats up properly. Has worsening of HF recently per cardiology and wondering if machine is functioning properly/still therapeutic. Has hoarseness in voice which he thinks may be due to this cold/dry air.  Saw ENT last month and was told exam was normal. Hoarseness is variable, sometimes better as the day goes on. Takes pantoprazole daily.   09/16/2024: Presents today for cardiac clearance for R CEA 09/23/2024, came with PAP. Transitioning to heparin for bridge (on AC for AVR). Uses BiPAP 95% of the time. Pressures set at 18 IPAP, 14 EPAP for ENEDINA. No difficulty breathing. Regarding his COPD, he is using stiolto respimat and albuterol prn. No recent hospitalization or lung infection. Procedure will be done at [facility]. Not general anesthesia, MAC.   DME: Mask: Crooks LT Machine: Aircurve 10, EPAP: 14, Pressure support 4  [TextBox_11] : 1-2 [TextBox_13] : 5 [YearQuit] : 1993 [ESS] : 2

## 2024-09-17 ENCOUNTER — NON-APPOINTMENT (OUTPATIENT)
Age: 84
End: 2024-09-17

## 2024-09-18 LAB
INR PPP: 2.9
INR PPP: 3.5
INR PPP: 3.9

## 2024-09-19 ENCOUNTER — NON-APPOINTMENT (OUTPATIENT)
Age: 84
End: 2024-09-19

## 2024-09-19 ENCOUNTER — INPATIENT (INPATIENT)
Facility: HOSPITAL | Age: 84
LOS: 3 days | Discharge: HOME | End: 2024-09-23
Attending: SURGERY | Admitting: SURGERY
Payer: MEDICARE

## 2024-09-19 VITALS
HEART RATE: 55 BPM | HEIGHT: 70 IN | SYSTOLIC BLOOD PRESSURE: 156 MMHG | WEIGHT: 164.02 LBS | RESPIRATION RATE: 18 BRPM | OXYGEN SATURATION: 98 % | DIASTOLIC BLOOD PRESSURE: 70 MMHG | TEMPERATURE: 98 F

## 2024-09-19 LAB
ANION GAP SERPL CALC-SCNC: 10 MMOL/L — SIGNIFICANT CHANGE UP (ref 5–17)
APTT BLD: 40 SEC — HIGH (ref 24.5–35.6)
BLD GP AB SCN SERPL QL: NEGATIVE — SIGNIFICANT CHANGE UP
BUN SERPL-MCNC: 20 MG/DL — SIGNIFICANT CHANGE UP (ref 7–23)
CALCIUM SERPL-MCNC: 9 MG/DL — SIGNIFICANT CHANGE UP (ref 8.4–10.5)
CHLORIDE SERPL-SCNC: 108 MMOL/L — SIGNIFICANT CHANGE UP (ref 96–108)
CO2 SERPL-SCNC: 23 MMOL/L — SIGNIFICANT CHANGE UP (ref 22–31)
CREAT SERPL-MCNC: 1.41 MG/DL — HIGH (ref 0.5–1.3)
EGFR: 49 ML/MIN/1.73M2 — LOW
GLUCOSE SERPL-MCNC: 127 MG/DL — HIGH (ref 70–99)
HCT VFR BLD CALC: 31.5 % — LOW (ref 39–50)
HGB BLD-MCNC: 9.8 G/DL — LOW (ref 13–17)
INR BLD: 2.74 — HIGH (ref 0.85–1.18)
MAGNESIUM SERPL-MCNC: 1.4 MG/DL — LOW (ref 1.6–2.6)
MCHC RBC-ENTMCNC: 26.3 PG — LOW (ref 27–34)
MCHC RBC-ENTMCNC: 31.1 GM/DL — LOW (ref 32–36)
MCV RBC AUTO: 84.5 FL — SIGNIFICANT CHANGE UP (ref 80–100)
NRBC # BLD: 0 /100 WBCS — SIGNIFICANT CHANGE UP (ref 0–0)
PHOSPHATE SERPL-MCNC: 2.5 MG/DL — SIGNIFICANT CHANGE UP (ref 2.5–4.5)
PLATELET # BLD AUTO: 57 K/UL — LOW (ref 150–400)
POTASSIUM SERPL-MCNC: 3.8 MMOL/L — SIGNIFICANT CHANGE UP (ref 3.5–5.3)
POTASSIUM SERPL-SCNC: 3.8 MMOL/L — SIGNIFICANT CHANGE UP (ref 3.5–5.3)
PROTHROM AB SERPL-ACNC: 30.3 SEC — HIGH (ref 9.5–13)
RBC # BLD: 3.73 M/UL — LOW (ref 4.2–5.8)
RBC # FLD: 24.1 % — HIGH (ref 10.3–14.5)
RH IG SCN BLD-IMP: POSITIVE — SIGNIFICANT CHANGE UP
SODIUM SERPL-SCNC: 141 MMOL/L — SIGNIFICANT CHANGE UP (ref 135–145)
WBC # BLD: 6.26 K/UL — SIGNIFICANT CHANGE UP (ref 3.8–10.5)
WBC # FLD AUTO: 6.26 K/UL — SIGNIFICANT CHANGE UP (ref 3.8–10.5)

## 2024-09-19 PROCEDURE — 93010 ELECTROCARDIOGRAM REPORT: CPT

## 2024-09-19 PROCEDURE — 99223 1ST HOSP IP/OBS HIGH 75: CPT | Mod: GC

## 2024-09-19 RX ORDER — SODIUM CHLORIDE 0.9 % (FLUSH) 0.9 %
1000 SYRINGE (ML) INJECTION
Refills: 0 | Status: DISCONTINUED | OUTPATIENT
Start: 2024-09-19 | End: 2024-09-19

## 2024-09-19 RX ORDER — MAGNESIUM SULFATE 500 MG/ML
2 VIAL (ML) INJECTION ONCE
Refills: 0 | Status: COMPLETED | OUTPATIENT
Start: 2024-09-19 | End: 2024-09-19

## 2024-09-19 RX ORDER — TIOTROPIUM BROMIDE INHALATION SPRAY 3.12 UG/1
2 SPRAY, METERED RESPIRATORY (INHALATION) DAILY
Refills: 0 | Status: DISCONTINUED | OUTPATIENT
Start: 2024-09-19 | End: 2024-09-20

## 2024-09-19 RX ORDER — LATANOPROST 50 UG/ML
1 SOLUTION OPHTHALMIC AT BEDTIME
Refills: 0 | Status: DISCONTINUED | OUTPATIENT
Start: 2024-09-19 | End: 2024-09-23

## 2024-09-19 RX ORDER — ALBUTEROL 90 MCG
2 AEROSOL (GRAM) INHALATION EVERY 6 HOURS
Refills: 0 | Status: DISCONTINUED | OUTPATIENT
Start: 2024-09-19 | End: 2024-09-20

## 2024-09-19 RX ORDER — BRIMONIDINE TARTRATE 1 MG/ML
1 SOLUTION/ DROPS OPHTHALMIC
Refills: 0 | Status: DISCONTINUED | OUTPATIENT
Start: 2024-09-19 | End: 2024-09-23

## 2024-09-19 RX ORDER — PANTOPRAZOLE SODIUM 40 MG/1
40 TABLET, DELAYED RELEASE ORAL
Refills: 0 | Status: DISCONTINUED | OUTPATIENT
Start: 2024-09-19 | End: 2024-09-23

## 2024-09-19 RX ORDER — ATORVASTATIN CALCIUM 10 MG/1
80 TABLET, FILM COATED ORAL AT BEDTIME
Refills: 0 | Status: DISCONTINUED | OUTPATIENT
Start: 2024-09-19 | End: 2024-09-23

## 2024-09-19 RX ORDER — PHYTONADIONE (VIT K1)
2.5 CRYSTALS MISCELLANEOUS ONCE
Refills: 0 | Status: COMPLETED | OUTPATIENT
Start: 2024-09-19 | End: 2024-09-19

## 2024-09-19 RX ORDER — ATENOLOL 25 MG/1
12.5 TABLET ORAL DAILY
Refills: 0 | Status: DISCONTINUED | OUTPATIENT
Start: 2024-09-19 | End: 2024-09-23

## 2024-09-19 RX ORDER — TAMSULOSIN HCL 0.4 MG
0.4 CAPSULE ORAL AT BEDTIME
Refills: 0 | Status: DISCONTINUED | OUTPATIENT
Start: 2024-09-19 | End: 2024-09-23

## 2024-09-19 RX ORDER — SODIUM CHLORIDE 0.9 % (FLUSH) 0.9 %
1000 SYRINGE (ML) INJECTION
Refills: 0 | Status: DISCONTINUED | OUTPATIENT
Start: 2024-09-19 | End: 2024-09-20

## 2024-09-19 RX ORDER — IPRATROPIUM BROMIDE AND ALBUTEROL SULFATE .5; 3 MG/3ML; MG/3ML
3 SOLUTION RESPIRATORY (INHALATION) ONCE
Refills: 0 | Status: COMPLETED | OUTPATIENT
Start: 2024-09-20 | End: 2024-09-20

## 2024-09-19 RX ORDER — SPIRONOLACTONE 100 MG/1
25 TABLET, FILM COATED ORAL DAILY
Refills: 0 | Status: DISCONTINUED | OUTPATIENT
Start: 2024-09-19 | End: 2024-09-23

## 2024-09-19 RX ORDER — AMIODARONE HYDROCHLORIDE 50 MG/ML
100 INJECTION, SOLUTION INTRAVENOUS DAILY
Refills: 0 | Status: DISCONTINUED | OUTPATIENT
Start: 2024-09-19 | End: 2024-09-23

## 2024-09-19 RX ADMIN — Medication 25 GRAM(S): at 23:30

## 2024-09-19 RX ADMIN — Medication 20 MILLIEQUIVALENT(S): at 23:30

## 2024-09-19 RX ADMIN — Medication 2.5 MILLIGRAM(S): at 23:31

## 2024-09-19 NOTE — PATIENT PROFILE ADULT - FALL HARM RISK - HARM RISK INTERVENTIONS

## 2024-09-19 NOTE — PATIENT PROFILE ADULT - FUNCTIONAL ASSESSMENT - BASIC MOBILITY 6.
3-calculated by average /Not able to assess (calculate score using [facility] averaging method)

## 2024-09-19 NOTE — H&P ADULT - ASSESSMENT
83yo M former smoker, with pmhx of HTN, HLD, COPD, ENEDINA (home CPAP), AVR (mechanical - on Coumadin goal 2.5-3.5), HFpEF, paroxysmal Afib, PAD, ITP (followed by hematologist Dr. Aden Saeed, baseline platelets 70k), CKD  (baseline Cr 1.2-1.3), scoliosis, cardiac arrest in 1995 2/2 V Tach s/p ICD placed, cardiac arrest early 2000s received shocks from  ICD, b/l carotid stenosis and stroke (R subacute to chronic infarct in occipital lobe, and small infarcts, ?age in R thalamus, and R MCA/PCA watershed). Carotid duplex done in office with R ICA >80% stenosis with soft plaque, velocities 501/111 cm/s, L ICA with <50% stenosis, velocities up to 122/38 cm/s. Per daughter, 1 month ago patient began complaining of intermittent LUE numbness, and then began having LUE weakness along with LLE numbness and weakness 1 week later which prompted further w/u, patient then found to have stroke on CT. Patient planned for cardiac cath 9/20 and R CEA on Monday 9/23. Of note, last INR 2.9. Admitted to vascular surgery service. 83yo M former smoker, with pmhx of HTN, HLD, COPD, ENEDINA (home bipap), AVR (mechanical - on Coumadin goal 2.5-3.5), HFpEF, paroxysmal Afib, PAD, ITP (followed by hematologist Dr. Aden Saeed, baseline platelets 70k), CKD  (baseline Cr 1.2-1.3), scoliosis, cardiac arrest in 1995 2/2 V Tach s/p ICD placed, cardiac arrest early 2000s received shocks from  ICD, b/l carotid stenosis and stroke (R subacute to chronic infarct in occipital lobe, and small infarcts, ?age in R thalamus, and R MCA/PCA watershed). Carotid duplex done in office with R ICA >80% stenosis with soft plaque, velocities 501/111 cm/s, L ICA with <50% stenosis, velocities up to 122/38 cm/s. Per daughter, 1 month ago patient began complaining of intermittent LUE numbness, and then began having LUE weakness along with LLE numbness and weakness 1 week later which prompted further w/u, patient then found to have stroke on CT. Patient planned for cardiac cath 9/20 and R CEA on Monday 9/23. Of note, last INR 2.9. Admitted to vascular surgery service.    Vascular/Carotid Stenosis  - Plan for R CEA Monday 9/23  - Plan for cardiac cath 9/20 for preop cardiac eval, added on with Dr. Guanako Thakkar  - Will d/w cards and heme re antiplatelet/anticoagulation plan after cardiac cath    AVR (mechanical valve)  - Outpatient INR goal 2.5-3.5  - On coumadin at home, last dose 9/17  - last INR 2.9 9/19 at home  - vitamin K 2.5 mg PO  - plan for hep gtt pending coags    HFpEF  - 9/11/24: OG negative for thrombi, EF 50-55%    HTN/HLD  - c/w Spironolactone, Atenolol  - c/w Atorvastatin  - holding Lasix, Losartan    COPD  - c/w Albuterol, Spiriva  - duoneb prior to procedure  - c/w home bipap  - follows with Pulmonologist, Dr. Wright    ENEDINA  - home bipap    CKD  - baseline Cr 1.2-1.3  - avoid nephrotoxic agents  - f/u Cr    BPH  - c/w flomax    GERD  - c/w PPI    Chronic vertigo  - holding meclizine (pt reports no benefit from medication)    Glaucoma  - c/w eye drops    Thrombocytopenia  - baseline 70k  - follow with hematologist Dr. Saeed    Diet: NPO pMN  Activity: as stu  DVTppx: hep gtt  Dispo: pending cardiac cath and R CEA 83yo M former smoker, with pmhx of HTN, HLD, COPD, ENEDINA (home bipap), AVR (mechanical - on Coumadin goal 2.5-3.5), HFpEF, paroxysmal Afib, PAD, ITP (followed by hematologist Dr. Aden Saeed, baseline platelets 70k), CKD  (baseline Cr 1.2-1.3), scoliosis, cardiac arrest in 1995 2/2 V Tach s/p ICD placed, cardiac arrest early 2000s received shocks from  ICD, b/l carotid stenosis and stroke (R subacute to chronic infarct in occipital lobe, and small infarcts, ?age in R thalamus, and R MCA/PCA watershed). Carotid duplex done in office with R ICA >80% stenosis with soft plaque, velocities 501/111 cm/s, L ICA with <50% stenosis, velocities up to 122/38 cm/s. Per daughter, 1 month ago patient began complaining of intermittent LUE numbness, and then began having LUE weakness along with LLE numbness and weakness 1 week later which prompted further w/u, patient then found to have stroke on CT. Patient planned for cardiac cath 9/20 and R CEA on Monday 9/23. Of note, last INR 2.9. Admitted to vascular surgery service.    Vascular/Carotid Stenosis  - Plan for R CEA Monday 9/23  - Plan for cardiac cath 9/20 for preop cardiac eval, added on with Dr. Guanako Thakkar  - Will d/w cards and heme re antiplatelet/anticoagulation plan after cardiac cath  - Platelets on hold for OR    AVR (mechanical valve)  - Outpatient INR goal 2.5-3.5  - On coumadin at home, last dose 9/17  - last INR 2.9 9/19 at home  - vitamin K 2.5 mg PO  - plan for hep gtt pending coags    HFpEF  - 9/11/24: OG negative for thrombi, EF 50-55%    HTN/HLD  - c/w Spironolactone, Atenolol  - c/w Atorvastatin  - holding Lasix, Losartan    COPD  - c/w Albuterol, Spiriva  - duoneb prior to procedure  - c/w home bipap  - follows with Pulmonologist, Dr. 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 Saeed  - Per Dr. Saeed, cleared to proceed with CEA and continue current anticoagulation regimen so long as platelets >50k in the setting of suspected ITP.     Anemia  MGUS  - Patient reports history of CARLEY, but on recent labwork obtained by Dr. Saeed, he is currently Iron replete. Iron supplementation discontinued at that time.   - Outpatient labs show trend towards anemia with low positive haptoglobin.   - Dr. Saeed planning possible outpatient bone marrow biopsy to evaluate for MM and other causes of anemia.   - Will discuss inpatient heme consult given progression of thrombocytopenia (57k here from 70s) and anemia in the preoperative setting.   - Will update iron studies and hemolysis labs.     Diet: NPO pMN  Activity: as stu  DVTppx: hep gtt  Dispo: pending cardiac cath and R CEA 81yo M former smoker, with pmhx of HTN, HLD, COPD, ENEDINA (home bipap), AVR (mechanical - on Coumadin goal 2.5-3.5), HFpEF, paroxysmal Afib, PAD, ITP (followed by hematologist Dr. Aden Saeed, baseline platelets 70k), CKD  (baseline Cr 1.2-1.3), scoliosis, cardiac arrest in 1995 2/2 V Tach s/p ICD placed, cardiac arrest early 2000s received shocks from  ICD, b/l carotid stenosis and stroke (R subacute to chronic infarct in occipital lobe, and small infarcts, ?age in R thalamus, and R MCA/PCA watershed). Carotid duplex done in office with R ICA >80% stenosis with soft plaque, velocities 501/111 cm/s, L ICA with <50% stenosis, velocities up to 122/38 cm/s. Per daughter, 1 month ago patient began complaining of intermittent LUE numbness, and then began having LUE weakness along with LLE numbness and weakness 1 week later which prompted further w/u, patient then found to have stroke on CT. Patient planned for cardiac cath 9/20 and R CEA on Monday 9/23. Of note, last INR 2.9. Admitted to vascular surgery service.    Vascular/Carotid Stenosis  - Plan for R CEA Monday 9/23  - Plan for cardiac cath 9/20 for preop cardiac eval, added on with Dr. Guanako Thakkar  - Will d/w cards and heme re antiplatelet/anticoagulation plan after cardiac cath  - Platelets on hold for OR    AVR (mechanical valve)  - Outpatient INR goal 2.5-3.5  - On coumadin at home, last dose 9/17  - last INR 2.9 9/19 at home  - vitamin K 2.5 mg PO  - plan for hep gtt pending coags    HFpEF  - 9/11/24: OG negative for thrombi, EF 50-55%    HTN/HLD  - c/w Spironolactone, Atenolol  - c/w Atorvastatin  - holding Lasix, Losartan    COPD  - c/w Albuterol, Spiriva  - duoneb prior to procedure  - c/w home bipap  - follows with Pulmonologist, Dr. 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 Saeed  - Per Dr. Saeed, cleared to proceed with CEA and continue current anticoagulation regimen so long as platelets >50k in the setting of suspected ITP.     Anemia  MGUS  - Patient reports history of CARLEY, but on recent labwork obtained by Dr. Saeed, he is currently Iron replete. Iron supplementation discontinued at that time.   - Outpatient labs show trend towards progressive anemia with low positive haptoglobin.   - Dr. Saeed planning possible outpatient bone marrow biopsy to evaluate for MM and other causes of anemia.   - Will discuss inpatient heme consult given progression of thrombocytopenia (57k here from 70s) and anemia in the preoperative setting; may not be needed given thorough ongoing outpatient workup.  - Will update iron studies and hemolysis labs.     Diet: NPO pMN  Activity: as stu  DVTppx: hep gtt  Dispo: pending cardiac cath and R CEA 83yo M former smoker, with pmhx of HTN, HLD, COPD, ENEDINA (home bipap), AVR (mechanical - on Coumadin goal 2.5-3.5), HFpEF, paroxysmal Afib, PAD, ITP (followed by hematologist Dr. Aden Saeed, baseline platelets 70k), CKD  (baseline Cr 1.2-1.3), scoliosis, cardiac arrest in 1995 2/2 V Tach s/p ICD placed, cardiac arrest early 2000s received shocks from  ICD, b/l carotid stenosis and stroke (R subacute to chronic infarct in occipital lobe, and small infarcts, ?age in R thalamus, and R MCA/PCA watershed). Carotid duplex done in office with R ICA >80% stenosis with soft plaque, velocities 501/111 cm/s, L ICA with <50% stenosis, velocities up to 122/38 cm/s. Per daughter, 1 month ago patient began complaining of intermittent LUE numbness, and then began having LUE weakness along with LLE numbness and mild weakness 1 week later which prompted further work up. Patient then found to have stroke on CT. Patient planned for cardiac cath 9/20 and R CEA on Monday 9/23. Of note, last INR 2.9. Admitted to vascular surgery service.    Vascular/Carotid Stenosis  - Plan for R CEA Monday 9/23  - Plan for cardiac cath 9/20 for preop cardiac eval, added on with Dr. Guanako Thakkar  - Will d/w cards and heme re antiplatelet/anticoagulation plan after cardiac cath  - Platelets on hold for OR    AVR (mechanical valve)  - Outpatient INR goal 2.5-3.5  - On coumadin at home, last dose 9/17  - last INR 2.9 9/19 at home  - vitamin K 2.5 mg PO  - plan for hep gtt pending coags    HFpEF  - 9/11/24: OG negative for thrombi, EF 50-55%    HTN/HLD  - c/w Spironolactone, Atenolol  - c/w Atorvastatin  - holding Lasix, Losartan    COPD  - c/w Albuterol, Spiriva  - duoneb prior to procedure  - c/w home bipap  - follows with Pulmonologist, Dr. 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 Saeed  - Per Dr. Saeed, cleared to proceed with CEA and continue current anticoagulation regimen so long as platelets >50k in the setting of suspected ITP.     Anemia  MGUS  - Patient reports history of CARLEY, but on recent labwork obtained by Dr. Saeed, he is currently Iron replete. Iron supplementation discontinued at that time.   - Outpatient labs show trend towards progressive anemia with low positive haptoglobin.   - Dr. Saeed planning possible outpatient bone marrow biopsy to evaluate for MM and other causes of anemia.   - Will discuss inpatient heme consult given progression of thrombocytopenia (57k here from 70s) and anemia in the preoperative setting; may not be needed given thorough ongoing outpatient workup.  - Will update iron studies and hemolysis labs.     Diet: NPO pMN  Activity: as stu  DVTppx: hep gtt  Dispo: pending cardiac cath and R CEA

## 2024-09-19 NOTE — H&P ADULT - HISTORY OF PRESENT ILLNESS
83yo M former smoker, with pmhx of HTN, HLD, COPD, ENEDINA (home CPAP), AVR (mechanical - on Coumadin goal 2.5-3.5), HFpEF, paroxysmal Afib, PAD, ITP (followed by hematologist Dr. Aden Saeed, baseline platelets 70k), CKD  (baseline Cr 1.2-1.3), scoliosis, cardiac arrest in 1995 2/2 V Tach s/p ICD placed, cardiac arrest early 2000s received shocks from  ICD, b/l carotid stenosis and stroke (R subacute to chronic infarct in occipital lobe, and small infarcts, ?age in R thalamus, and R MCA/PCA watershed). Carotid duplex done in office with R ICA >80% stenosis with soft plaque, velocities 501/111 cm/s, L ICA with <50% stenosis, velocities up to 122/38 cm/s. Per daughter, 1 month ago patient began complaining of intermittent LUE numbness, and then began having LUE weakness along with LLE numbness and weakness 1 week later which prompted further w/u, patient then found to have stroke on CT. Patient planned for cardiac cath 9/20 and R CEA on Monday 9/23. Of note, last INR 2.9. Admitted to vascular surgery service. 81yo M former smoker, with pmhx of HTN, HLD, COPD, ENEDINA (home bipap), AVR (mechanical - on Coumadin goal 2.5-3.5), HFpEF, paroxysmal Afib, PAD, ITP (followed by hematologist Dr. Aden Saeed, baseline platelets 70k), CKD  (baseline Cr 1.2-1.3), scoliosis, cardiac arrest in 1995 2/2 V Tach s/p ICD placed, cardiac arrest early 2000s received shocks from  ICD, b/l carotid stenosis and stroke (R subacute to chronic infarct in occipital lobe, and small infarcts, ?age in R thalamus, and R MCA/PCA watershed). Carotid duplex done in office with R ICA >80% stenosis with soft plaque, velocities 501/111 cm/s, L ICA with <50% stenosis, velocities up to 122/38 cm/s. Per daughter, 1 month ago patient began complaining of intermittent LUE numbness, and then began having LUE weakness along with LLE numbness and weakness 1 week later which prompted further w/u, patient then found to have stroke on CT. Patient planned for cardiac cath 9/20 and R CEA on Monday 9/23. Of note, last INR 2.9. Admitted to vascular surgery service. 81yo M former smoker, with pmhx of HTN, HLD, COPD, ENEDINA (home bipap), AVR (mechanical - on Coumadin goal 2.5-3.5), HFpEF, paroxysmal Afib, PAD, ITP (followed by hematologist Dr. Aden Saeed, baseline platelets 70k), CKD  (baseline Cr 1.2-1.3), scoliosis, cardiac arrest in 1995 2/2 V Tach s/p ICD placed, cardiac arrest early 2000s received shocks from  ICD, b/l carotid stenosis and stroke (R subacute to chronic infarct in occipital lobe, and small infarcts, ?age in R thalamus, and R MCA/PCA watershed). Carotid duplex done in office with R ICA >80% stenosis with soft plaque, velocities 501/111 cm/s, L ICA with <50% stenosis, velocities up to 122/38 cm/s. Per daughter, 1 month ago patient began complaining of intermittent LUE numbness, and then began having LUE weakness along with LLE numbness and mild weakness 1 week later which prompted further work up. Patient then found to have stroke on CT. Patient planned for cardiac cath 9/20 and R CEA on Monday 9/23. Of note, last INR 2.9. Admitted to vascular surgery service.

## 2024-09-19 NOTE — H&P ADULT - NSHPOUTPATIENTPROVIDERS_GEN_ALL_CORE
PCP: Dr. Kalyn Gavin (daughter)  Hematologist, Dr. Saeed  Pulmonologist, Dr. Wright  Cardiologist, Dr. Villegas

## 2024-09-19 NOTE — H&P ADULT - NSHPPHYSICALEXAM_GEN_ALL_CORE
PHYSICAL EXAM:  General: NAD, pt resting comfortably in bed  Pulm: No respiratory distress, nonlabored breathing, on room air  CVS: NSR, HDS  Abd: Soft, NT, ND  Extremities: WWP, 1+ bilateral pitting edema L>R  Pulses: palpable DP bilaterally

## 2024-09-20 LAB
ANION GAP SERPL CALC-SCNC: 6 MMOL/L — SIGNIFICANT CHANGE UP (ref 5–17)
APTT BLD: 60.3 SEC — HIGH (ref 24.5–35.6)
APTT BLD: 61.2 SEC — HIGH (ref 24.5–35.6)
BLD GP AB SCN SERPL QL: NEGATIVE — SIGNIFICANT CHANGE UP
BLD GP AB SCN SERPL QL: NEGATIVE — SIGNIFICANT CHANGE UP
BUN SERPL-MCNC: 19 MG/DL — SIGNIFICANT CHANGE UP (ref 7–23)
CALCIUM SERPL-MCNC: 8.6 MG/DL — SIGNIFICANT CHANGE UP (ref 8.4–10.5)
CHLORIDE SERPL-SCNC: 107 MMOL/L — SIGNIFICANT CHANGE UP (ref 96–108)
CLOSURE TME COLL+ADP BLD: 114 SEC — HIGH (ref 74–105)
CLOSURE TME COLL+EPINEP BLD: 143 SECS — SIGNIFICANT CHANGE UP (ref 80–156)
CO2 SERPL-SCNC: 24 MMOL/L — SIGNIFICANT CHANGE UP (ref 22–31)
CREAT SERPL-MCNC: 1.33 MG/DL — HIGH (ref 0.5–1.3)
EGFR: 52 ML/MIN/1.73M2 — LOW
FERRITIN SERPL-MCNC: 271 NG/ML — SIGNIFICANT CHANGE UP (ref 30–400)
GLUCOSE SERPL-MCNC: 104 MG/DL — HIGH (ref 70–99)
HAPTOGLOB SERPL-MCNC: <10 MG/DL — LOW (ref 34–200)
HCT VFR BLD CALC: 29.1 % — LOW (ref 39–50)
HCT VFR BLD CALC: 30.9 % — LOW (ref 39–50)
HGB BLD-MCNC: 8.8 G/DL — LOW (ref 13–17)
HGB BLD-MCNC: 9.4 G/DL — LOW (ref 13–17)
INR BLD: 1.95 — HIGH (ref 0.85–1.18)
INR BLD: 2.44 — HIGH (ref 0.85–1.18)
IRON SATN MFR SERPL: 72 UG/DL — SIGNIFICANT CHANGE UP (ref 45–165)
IRON SATN MFR SERPL: SIGNIFICANT CHANGE UP % (ref 16–55)
LDH SERPL L TO P-CCNC: 291 U/L — HIGH (ref 50–242)
MAGNESIUM SERPL-MCNC: 1.8 MG/DL — SIGNIFICANT CHANGE UP (ref 1.6–2.6)
MCHC RBC-ENTMCNC: 25.5 PG — LOW (ref 27–34)
MCHC RBC-ENTMCNC: 26.2 PG — LOW (ref 27–34)
MCHC RBC-ENTMCNC: 30.2 GM/DL — LOW (ref 32–36)
MCHC RBC-ENTMCNC: 30.4 GM/DL — LOW (ref 32–36)
MCV RBC AUTO: 84.3 FL — SIGNIFICANT CHANGE UP (ref 80–100)
MCV RBC AUTO: 86.1 FL — SIGNIFICANT CHANGE UP (ref 80–100)
NRBC # BLD: 0 /100 WBCS — SIGNIFICANT CHANGE UP (ref 0–0)
NRBC # BLD: 0 /100 WBCS — SIGNIFICANT CHANGE UP (ref 0–0)
PHOSPHATE SERPL-MCNC: 2.8 MG/DL — SIGNIFICANT CHANGE UP (ref 2.5–4.5)
PLATELET # BLD AUTO: 50 K/UL — LOW (ref 150–400)
PLATELET # BLD AUTO: 53 K/UL — LOW (ref 150–400)
POTASSIUM SERPL-MCNC: 4.3 MMOL/L — SIGNIFICANT CHANGE UP (ref 3.5–5.3)
POTASSIUM SERPL-SCNC: 4.3 MMOL/L — SIGNIFICANT CHANGE UP (ref 3.5–5.3)
PROTHROM AB SERPL-ACNC: 21.8 SEC — HIGH (ref 9.5–13)
PROTHROM AB SERPL-ACNC: 27.1 SEC — HIGH (ref 9.5–13)
RBC # BLD: 3.45 M/UL — LOW (ref 4.2–5.8)
RBC # BLD: 3.45 M/UL — LOW (ref 4.2–5.8)
RBC # BLD: 3.59 M/UL — LOW (ref 4.2–5.8)
RBC # FLD: 23.9 % — HIGH (ref 10.3–14.5)
RBC # FLD: 24 % — HIGH (ref 10.3–14.5)
RETICS #: 94.6 K/UL — SIGNIFICANT CHANGE UP (ref 25–125)
RETICS/RBC NFR: 2.8 % — HIGH (ref 0.5–2.5)
RH IG SCN BLD-IMP: POSITIVE — SIGNIFICANT CHANGE UP
RH IG SCN BLD-IMP: POSITIVE — SIGNIFICANT CHANGE UP
SODIUM SERPL-SCNC: 137 MMOL/L — SIGNIFICANT CHANGE UP (ref 135–145)
TIBC SERPL-MCNC: SIGNIFICANT CHANGE UP UG/DL (ref 220–430)
TRANSFERRIN SERPL-MCNC: 199 MG/DL — LOW (ref 200–360)
UIBC SERPL-MCNC: SIGNIFICANT CHANGE UP UG/DL (ref 110–370)
WBC # BLD: 6.01 K/UL — SIGNIFICANT CHANGE UP (ref 3.8–10.5)
WBC # BLD: 6.15 K/UL — SIGNIFICANT CHANGE UP (ref 3.8–10.5)
WBC # FLD AUTO: 6.01 K/UL — SIGNIFICANT CHANGE UP (ref 3.8–10.5)
WBC # FLD AUTO: 6.15 K/UL — SIGNIFICANT CHANGE UP (ref 3.8–10.5)

## 2024-09-20 PROCEDURE — 99233 SBSQ HOSP IP/OBS HIGH 50: CPT | Mod: GC

## 2024-09-20 PROCEDURE — 99222 1ST HOSP IP/OBS MODERATE 55: CPT

## 2024-09-20 PROCEDURE — 93454 CORONARY ARTERY ANGIO S&I: CPT | Mod: 26

## 2024-09-20 PROCEDURE — 99152 MOD SED SAME PHYS/QHP 5/>YRS: CPT

## 2024-09-20 RX ORDER — DIPHENHYDRAMINE HCL 12.5MG/5ML
25 LIQUID (ML) ORAL ONCE
Refills: 0 | Status: COMPLETED | OUTPATIENT
Start: 2024-09-20 | End: 2024-09-20

## 2024-09-20 RX ORDER — HYDRALAZINE HYDROCHLORIDE 100 MG/1
5 TABLET ORAL ONCE
Refills: 0 | Status: COMPLETED | OUTPATIENT
Start: 2024-09-20 | End: 2024-09-20

## 2024-09-20 RX ORDER — SODIUM CHLORIDE 0.9 % (FLUSH) 0.9 %
1000 SYRINGE (ML) INJECTION
Refills: 0 | Status: DISCONTINUED | OUTPATIENT
Start: 2024-09-20 | End: 2024-09-22

## 2024-09-20 RX ORDER — ACETAMINOPHEN 325 MG
650 TABLET ORAL EVERY 6 HOURS
Refills: 0 | Status: DISCONTINUED | OUTPATIENT
Start: 2024-09-20 | End: 2024-09-23

## 2024-09-20 RX ORDER — MAGNESIUM SULFATE 500 MG/ML
1 VIAL (ML) INJECTION ONCE
Refills: 0 | Status: COMPLETED | OUTPATIENT
Start: 2024-09-20 | End: 2024-09-20

## 2024-09-20 RX ORDER — IMMUNE GLOBULIN (HUMAN) 10 G/100ML
30 INJECTION INTRAVENOUS; SUBCUTANEOUS EVERY 24 HOURS
Refills: 0 | Status: DISCONTINUED | OUTPATIENT
Start: 2024-09-20 | End: 2024-09-23

## 2024-09-20 RX ORDER — PHYTONADIONE (VIT K1)
2.5 CRYSTALS MISCELLANEOUS ONCE
Refills: 0 | Status: COMPLETED | OUTPATIENT
Start: 2024-09-20 | End: 2024-09-20

## 2024-09-20 RX ORDER — TIOTROPIUM BROMIDE AND OLODATEROL 3.124; 2.736 UG/1; UG/1
2 SPRAY, METERED RESPIRATORY (INHALATION) DAILY
Refills: 0 | Status: DISCONTINUED | OUTPATIENT
Start: 2024-09-20 | End: 2024-09-23

## 2024-09-20 RX ORDER — HYDRALAZINE HYDROCHLORIDE 100 MG/1
10 TABLET ORAL ONCE
Refills: 0 | Status: COMPLETED | OUTPATIENT
Start: 2024-09-20 | End: 2024-09-20

## 2024-09-20 RX ORDER — IPRATROPIUM BROMIDE AND ALBUTEROL SULFATE .5; 3 MG/3ML; MG/3ML
3 SOLUTION RESPIRATORY (INHALATION) EVERY 6 HOURS
Refills: 0 | Status: DISCONTINUED | OUTPATIENT
Start: 2024-09-20 | End: 2024-09-23

## 2024-09-20 RX ADMIN — BRIMONIDINE TARTRATE 1 DROP(S): 1 SOLUTION/ DROPS OPHTHALMIC at 06:03

## 2024-09-20 RX ADMIN — Medication 30 MILLIGRAM(S): at 17:25

## 2024-09-20 RX ADMIN — IPRATROPIUM BROMIDE AND ALBUTEROL SULFATE 3 MILLILITER(S): .5; 3 SOLUTION RESPIRATORY (INHALATION) at 10:38

## 2024-09-20 RX ADMIN — HYDRALAZINE HYDROCHLORIDE 5 MILLIGRAM(S): 100 TABLET ORAL at 13:21

## 2024-09-20 RX ADMIN — SPIRONOLACTONE 25 MILLIGRAM(S): 100 TABLET, FILM COATED ORAL at 06:02

## 2024-09-20 RX ADMIN — Medication 75 MILLILITER(S): at 20:26

## 2024-09-20 RX ADMIN — Medication 1 DROP(S): at 06:04

## 2024-09-20 RX ADMIN — Medication 1 DROP(S): at 13:21

## 2024-09-20 RX ADMIN — Medication 100 GRAM(S): at 08:49

## 2024-09-20 RX ADMIN — Medication 8 UNIT(S)/HR: at 08:26

## 2024-09-20 RX ADMIN — Medication 100.5 MILLIGRAM(S): at 08:49

## 2024-09-20 RX ADMIN — TIOTROPIUM BROMIDE AND OLODATEROL 2 PUFF(S): 3.124; 2.736 SPRAY, METERED RESPIRATORY (INHALATION) at 11:35

## 2024-09-20 RX ADMIN — Medication 0.4 MILLIGRAM(S): at 21:25

## 2024-09-20 RX ADMIN — ATORVASTATIN CALCIUM 80 MILLIGRAM(S): 10 TABLET, FILM COATED ORAL at 21:25

## 2024-09-20 RX ADMIN — PANTOPRAZOLE SODIUM 40 MILLIGRAM(S): 40 TABLET, DELAYED RELEASE ORAL at 06:02

## 2024-09-20 RX ADMIN — IPRATROPIUM BROMIDE AND ALBUTEROL SULFATE 3 MILLILITER(S): .5; 3 SOLUTION RESPIRATORY (INHALATION) at 15:41

## 2024-09-20 RX ADMIN — ATENOLOL 12.5 MILLIGRAM(S): 25 TABLET ORAL at 05:59

## 2024-09-20 RX ADMIN — Medication 50 MILLILITER(S): at 01:14

## 2024-09-20 RX ADMIN — LATANOPROST 1 DROP(S): 50 SOLUTION OPHTHALMIC at 21:26

## 2024-09-20 RX ADMIN — Medication 650 MILLIGRAM(S): at 12:51

## 2024-09-20 RX ADMIN — HYDRALAZINE HYDROCHLORIDE 10 MILLIGRAM(S): 100 TABLET ORAL at 16:40

## 2024-09-20 RX ADMIN — Medication 8 UNIT(S)/HR: at 01:14

## 2024-09-20 RX ADMIN — Medication 25 MILLIGRAM(S): at 12:27

## 2024-09-20 RX ADMIN — Medication 1 DROP(S): at 21:42

## 2024-09-20 RX ADMIN — IMMUNE GLOBULIN (HUMAN) 75 GRAM(S): 10 INJECTION INTRAVENOUS; SUBCUTANEOUS at 12:51

## 2024-09-20 RX ADMIN — BRIMONIDINE TARTRATE 1 DROP(S): 1 SOLUTION/ DROPS OPHTHALMIC at 17:24

## 2024-09-20 RX ADMIN — AMIODARONE HYDROCHLORIDE 100 MILLIGRAM(S): 50 INJECTION, SOLUTION INTRAVENOUS at 06:02

## 2024-09-20 RX ADMIN — Medication 650 MILLIGRAM(S): at 12:27

## 2024-09-20 NOTE — CONSULT NOTE ADULT - SUBJECTIVE AND OBJECTIVE BOX
HPI as per vascular surgery H&P: 83yo M former smoker, with pmhx of HTN, HLD, COPD, ENEDINA (home bipap), AVR (mechanical - on Coumadin goal 2.5-3.5), HFpEF, paroxysmal Afib, PAD, ITP (followed by hematologist Dr. Aden Saeed, baseline platelets 70k), CKD  (baseline Cr 1.2-1.3), scoliosis, cardiac arrest in 1995 2/2 V Tach s/p ICD placed, cardiac arrest early 2000s received shocks from  ICD, b/l carotid stenosis and stroke (R subacute to chronic infarct in occipital lobe, and small infarcts, ?age in R thalamus, and R MCA/PCA watershed). Carotid duplex done in office with R ICA >80% stenosis with soft plaque, velocities 501/111 cm/s, L ICA with <50% stenosis, velocities up to 122/38 cm/s. Per daughter, 1 month ago patient began complaining of intermittent LUE numbness, and then began having LUE weakness along with LLE numbness and mild weakness 1 week later which prompted further work up. Patient then found to have stroke on CT. Patient planned for cardiac cath 9/20 and R CEA on Monday 9/23. Of note, last INR 2.9. Admitted to vascular surgery service.    Today, patient does not have any acute complaints.  Denies HA, CP, SOB, abdominal pain, nausea, vomiting, fever, chills or diarrhea.     PMHx: as per HPI     PSHx: AVR, appendectomy, ICD     FHx: non-contributory     SHx: former smoker, occasional alcohol use, denies illicit drug use     Allergies: Metoprolol Succinate     Home Medications:   -Tamsulosin 0.4mg daily    -Spironolactone 25mg daily    -Losartan 50mg daily    -Lasix 20mg daily    -Atorvastatin 80mg daily    -Atenolol 12.5mg daily    -Amiodarone 100mg daily    -Coumadin 5mg daily    -Meclizine 25mg daily    -Spiriva and Albuterol    -Pantoprazole 40mg daily      Objective:   Vital Signs Last 24 Hrs  T(C): 35.6 (20 Sep 2024 09:18), Max: 36.6 (20 Sep 2024 05:50)  T(F): 96 (20 Sep 2024 09:18), Max: 97.9 (20 Sep 2024 05:50)  HR: 50 (20 Sep 2024 09:18) (48 - 56)  BP: 167/74 (20 Sep 2024 09:18) (156/70 - 172/66)  BP(mean): 112 (20 Sep 2024 09:18) (100 - 112)  RR: 18 (20 Sep 2024 09:18) (18 - 20)  SpO2: 95% (20 Sep 2024 09:18) (93% - 100%)    Parameters below as of 20 Sep 2024 09:18  Patient On (Oxygen Delivery Method): BiPAP/CPAP    Physical Exam:   -Gen: NAD, resting in bed  -HEENT: EOMI, PERRL, no scleral icterus  -CV: normal S1 and S2  -Lungs: CTABL, normal respiratory effort on BiPAP  -Ab: soft, NT, ND, normal BS  -Ext: no LE edema  -Neuro: A&O x 3,     Labs:                        8.8    6.15  )-----------( 50       ( 20 Sep 2024 05:30 )             29.1       09-20    137  |  107  |  19  ----------------------------<  104[H]  4.3   |  24  |  1.33[H]    Ca    8.6      20 Sep 2024 05:30  Phos  2.8     09-20  Mg     1.8     09-20     Medications:  MEDICATIONS  (STANDING):  aMIOdarone    Tablet 100 milliGRAM(s) Oral daily  atenolol  Tablet 12.5 milliGRAM(s) Oral daily  atorvastatin 80 milliGRAM(s) Oral at bedtime  brimonidine 0.2% Ophthalmic Solution 1 Drop(s) Right EYE two times a day  dorzolamide 2% Ophthalmic Solution 1 Drop(s) Right EYE every 8 hours  heparin  Infusion 800 Unit(s)/Hr (8 mL/Hr) IV Continuous <Continuous>  immune   globulin 10% (GAMMAGARD) IVPB 30 Gram(s) IV Intermittent every 24 hours  latanoprost 0.005% Ophthalmic Solution 1 Drop(s) Both EYES at bedtime  pantoprazole    Tablet 40 milliGRAM(s) Oral before breakfast  sodium chloride 0.9%. 1000 milliLiter(s) (50 mL/Hr) IV Continuous <Continuous>  spironolactone 25 milliGRAM(s) Oral daily  tamsulosin 0.4 milliGRAM(s) Oral at bedtime  tiotropium 2.5 MICROgram(s)/olodaterol 2.5 MICROgram(s) (STIOLTO) Inhaler 2 Puff(s) Inhalation daily    MEDICATIONS  (PRN):  acetaminophen     Tablet .. 650 milliGRAM(s) Oral every 6 hours PRN Mild Pain (1 - 3)  albuterol/ipratropium for Nebulization 3 milliLiter(s) Nebulizer every 6 hours PRN Shortness of Breath and/or Wheezing

## 2024-09-20 NOTE — CONSULT NOTE ADULT - ASSESSMENT
82-year-old male with a PMHx of HTN, HLD, ENEDINA, COPD, AVR (on Warfarin), HFpEF, pAfib, PAD, ITP, CKD (baseline Cr 1.2 - 1.3), VTach (s/p ICD), CVA and FRANCY who presented for right CEA.      #Carotid Artery Stenosis    #PAD   -further management as per vascular surgery, plan for LHC (9/20) and right CEA (9/23)   -management of anti-platelets and anti-coagulation as per primary team, cardiology and hematology    -currently om heparin gtt while holding warfarin    -currently on Atorvastatin 80mg qhs     #ITP   #MGUS   -further management by outpatient hematologist   -agree with repeat CBC prior to LHC to ensure platelets > 50   -IVIG ordered, follow up with hematology if additional recommendations     #HTN   -continue with Spironolactone 25mg daily and Atenolol 12.5mg daily   -Lasix and Losartan held in terri-operative period    #COPD   -continue with Stiolto and PRN duonebs     #ENEDINA   -continue with nocturnal BiPAP (settings 18/14 as per outpatient pulmonology note)     #CKD (baseline Cr 1.2 - 1.3)   -Cr near baseline, continue to monitor with daily BMP    -agree with mIVFs pre and post cardiac catheterization      #BPH   -continue with Tamsulosin 0.4mg qhs     #History of VTach (s/p ICD)  -continue with Amiodarone 100mg daily     DVT PPx; heparin gtt    Dispo: pending OR 82-year-old male with a PMHx of HTN, HLD, ENEDINA, COPD, AVR (on Warfarin), HFpEF, pAfib, PAD, ITP, CKD (baseline Cr 1.2 - 1.3), VTach (s/p ICD), CVA and FRANCY who presented for right CEA.      #Carotid Artery Stenosis    #PAD   -further management as per vascular surgery, plan for LHC (9/20) and right CEA (9/23)   -management of anti-platelets and anti-coagulation as per primary team, cardiology and hematology    -currently om heparin gtt while holding warfarin    -currently on Atorvastatin 80mg qhs     #ITP   #MGUS   -further management by outpatient hematologist   -agree with repeat CBC prior to LHC to ensure platelets > 50   -IVIG ordered, follow up with hematology if additional recommendations     #pAfib   -currently on heparin gtt while warfarin is held   -continue with Atenolol 12.5mg daily     #HTN   -continue with Spironolactone 25mg daily and Atenolol 12.5mg daily   -Lasix and Losartan held in terri-operative period    #COPD   -continue with Stiolto and PRN duonebs     #ENEDIAN   -continue with nocturnal BiPAP (settings 18/14 as per outpatient pulmonology note)     #CKD (baseline Cr 1.2 - 1.3)   -Cr near baseline, continue to monitor with daily BMP    -agree with mIVFs pre and post cardiac catheterization      #BPH   -continue with Tamsulosin 0.4mg qhs     #History of VTach (s/p ICD)  -continue with Amiodarone 100mg daily     DVT PPx; heparin gtt    Dispo: pending OR

## 2024-09-20 NOTE — ASSESSMENT
The pt has no PCP and no ins at this time,patient was seen on 9/8 and tx, pt site reopened and pt work note is not covering him through todays date, pt states that he needs a note so he can return to work.          [FreeTextEntry1] : The patient is an 84 year old male with a PMH of HTN, HLD, CODP, PVD, HFpEF, AVR in 1993 (mechanical, on warfarin), cardiac arrest s/p AICD, ITP, and a fib. Both his history of "stuttering" symptoms and exam are consistent with the R thalamic (with minimal capsular involvement) stroke seen on recent HCT.  Given the "stuttering nature" lacunar syndrome/small vessel etiology is favored. However, I agree with plan for TTE and will also obtain repeat HCT and CTA head/neck to r/o large vessel disease and evaluate for additional ischemic events which would suggest an alternative mechanism. I would not recommended the addition of ASA 81 mg at this time give he is on Coumadin with some lability in INR lately and recent Hgb drop. I agree with increased dose of atorvastatin. PT/OT. RTC 2 months. Will call w results of CT/CTA H/N.

## 2024-09-20 NOTE — PROGRESS NOTE ADULT - SUBJECTIVE AND OBJECTIVE BOX
O/N: plan for cardiac cath in AM (added on Dr. Thakkar), preop done, INR 2.7, vit K, hep gtt at 8cc/hr, Mg 1.4, K 3.8, repleted, platelets 57 (baseline 70s), Cr 1.4 (baseline 1.2-1.3), NS at 50cc/hr preop hydration. CXR done 9/16. EKG paced rhythm.        ---------------------------------------------------------------------------  PLEASE CHECK WHEN PRESENT:     [  ]Heart Failure     [  ] Acute     [  ] Acute on Chronic     [  ] Chronic  -------------------------------------------------------------------     [x  ]Diastolic [HFpEF]     [  ]Systolic [HFrEF]     [  ]Combined [HFpEF & HFrEF]  .................................................................................     [  ]Other:     [ ] Pulmonary Hypertension     [ ] Chronic A-fib     [ ] Persistet A-fib     [ ] Permanent A-fib     [x ] Paroxysmal A-fib     [x] Hypertensive Heart Disease  -------------------------------------------------------------------  [ ] Respiratory failure  [ ] Acute cor pulmonale  [ ] Asthma/COPD Exacerbation  [ ]COPD on home O2 (Chronic renal Failure)   [ ] Pleural effusion  [ ] Aspiration pneumonia  [x ] Obstructive Sleep Apnea  [ ]Atelectasis   [ ] Acute PE   -------------------------------------------------------------------  [  ]Acute Kidney Injury      [  ]Acute Tubular Necrosis      [  ]Reneal Medullary Necrosis     [  ]Renal Cortical Necrosis     [  ]Other Pathological Lesions:    [  ]CKD 1  [  ]CKD 2  [  ]CKD 3  [  ]CKD 4  [  ]CKD 5 (ESRD)  [  ]Other  -------------------------------------------------------------------  [  ]Diabetes  [  ] Diabetic PVD Ulcer  [  ] Neuropathic ulcer to DM  [  ] Diabetes with Nephropathy  [  ] Osteomyelitis due to diabetes  [  ] Hyperglycemia   [  ]hypoglycemia   --------------------------------------------------------------------  [  ]Malnutrition: See Nutrition Note  [  ]Cachexia  [  ]Other:   [  ]Supplement Ordered:  [  ]Morbid Obesity (BMI >=40]  [ ] Ileus  ---------------------------------------------------------------------  [ ] Sepsis/severe sepsis/septic shock  [ ] Noninfectious SIRS  [ ] UTI  [ ] Pneumonia  [ ] Thrombophlebitis     -----------------------------------------------------------------------  [ ] Acidosis/alkalosis  [ ] Fluid overload  [ ] Hypokalemia  [ ] Hyperkalemia  [x ] Hypomagnesemia  [ ] Hypophosphatemia  [ ] Hyperphosphatemia  ------------------------------------------------------------------------  [ ] Acute blood loss anemia  [ ] Post op blood loss anemia  [ ] Iron deficiency anemia  [ ] Anemia due to chronic disease  [ ] Hypercoagulable state  [x ] Thrombocytopenia  ----------------------------------------------------------------------  [x ] Cerebral infarction  [ ] Transient ischemia attack  [ ] Encephalopathy - Toxic or Metabolic    A/P: 83yo M former smoker, with pmhx of HTN, HLD, COPD, ENEDINA (home bipap), AVR (mechanical - on Coumadin goal 2.5-3.5), HFpEF, paroxysmal Afib, PAD, ITP (followed by hematologist Dr. Aden Saeed, baseline platelets 70k), CKD  (baseline Cr 1.2-1.3), scoliosis, cardiac arrest in 1995 2/2 V Tach s/p ICD placed, cardiac arrest early 2000s received shocks from  ICD, b/l carotid stenosis and stroke (R subacute to chronic infarct in occipital lobe, and small infarcts, ?age in R thalamus, and R MCA/PCA watershed). Carotid duplex done in office with R ICA >80% stenosis with soft plaque, velocities 501/111 cm/s, L ICA with <50% stenosis, velocities up to 122/38 cm/s. Per daughter, 1 month ago patient began complaining of intermittent LUE numbness, and then began having LUE weakness along with LLE numbness and mild weakness 1 week later which prompted further work up. Patient then found to have stroke on CT. Patient planned for cardiac cath 9/20 and R CEA on Monday 9/23. Of note, last INR 2.9. Admitted to vascular surgery service.    Vascular/Carotid Stenosis  - Plan for R CEA Monday 9/23  - Plan for cardiac cath 9/20 for preop cardiac eval, added on with Dr. Guanako Thakkar  - Will d/w cards and heme re antiplatelet/anticoagulation plan after cardiac cath  - Platelets on hold for OR    AVR (mechanical valve)  - Outpatient INR goal 2.5-3.5  - On coumadin at home, last dose 9/17  - last INR 2.9 9/19, INR on admission 2.74, will f/u AM INR  - s/p vitamin K 2.5 mg PO 9/19  - hep gtt, f/u PTT    HFpEF  - 9/11/24: OG negative for thrombi, EF 50-55%    HTN/HLD  - c/w Spironolactone, Atenolol  - c/w Atorvastatin  - holding Lasix, Losartan    COPD  - c/w Albuterol, Spiriva  - duoneb prior to procedure  - c/w home bipap  - follows with Pulmonologist, Dr. 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 aSeed  - Per Dr. Saeed, cleared to proceed with CEA and continue current anticoagulation regimen so long as platelets >50k in the setting of suspected ITP.     Anemia  MGUS  - Patient reports history of CARLEY, but on recent labwork obtained by Dr. Saeed, he is currently Iron replete. Iron supplementation discontinued at that time.   - Outpatient labs show trend towards progressive anemia with low positive haptoglobin.   - Dr. Saeed planning possible outpatient bone marrow biopsy to evaluate for MM and other causes of anemia.   - Will discuss inpatient heme consult given progression of thrombocytopenia (57k here from 70s) and anemia in the preoperative setting; may not be needed given thorough ongoing outpatient workup.  - Will update iron studies and hemolysis labs.     Diet: NPO pMN  Activity: as stu  DVTppx: hep gtt  Dispo: pending cardiac cath and R CEA                 O/N: plan for cardiac cath in AM (added on Dr. Thakkar), preop done, INR 2.7, vit K, hep gtt at 8cc/hr, Mg 1.4, K 3.8, repleted, platelets 57 (baseline 70s), Cr 1.4 (baseline 1.2-1.3), NS at 50cc/hr preop hydration. CXR done 9/16. EKG paced rhythm.      Subjective:     ROS:   Denies Headache, blurred vision, Chest Pain, SOB, Abdominal pain, nausea or vomiting     Social   aMIOdarone    Tablet 100  atenolol  Tablet 12.5  heparin  Infusion 800  spironolactone 25      Allergies    Toprol-XL (Unknown)    Intolerances        Vital Signs Last 24 Hrs  T(C): 36.6 (20 Sep 2024 05:50), Max: 36.6 (20 Sep 2024 05:50)  T(F): 97.9 (20 Sep 2024 05:50), Max: 97.9 (20 Sep 2024 05:50)  HR: 50 (20 Sep 2024 05:50) (48 - 56)  BP: 172/66 (20 Sep 2024 05:50) (156/70 - 172/66)  BP(mean): 101 (20 Sep 2024 05:50) (100 - 103)  RR: 18 (20 Sep 2024 05:50) (18 - 20)  SpO2: 93% (20 Sep 2024 05:50) (93% - 100%)    Parameters below as of 20 Sep 2024 05:50  Patient On (Oxygen Delivery Method): BiPAP/CPAP      I&O's Summary    19 Sep 2024 07:01  -  20 Sep 2024 07:00  --------------------------------------------------------  IN: 572 mL / OUT: 400 mL / NET: 172 mL        PHYSICAL EXAM:  General: NAD, pt resting comfortably in bed  Neuro: A&Ox3, strength 5/5x4, motor/sensory grossly intact, symmetrical smile, no facial droop, tongue midline  Pulm: No respiratory distress, nonlabored breathing, on room air  CVS: NSR, HDS  Abd: Soft, NT, ND  Extremities: WWP, mild pitting edema L>R      LABS:                        8.8    6.15  )-----------( 50       ( 20 Sep 2024 05:30 )             29.1     09-20    137  |  107  |  19  ----------------------------<  104[H]  4.3   |  24  |  1.33[H]    Ca    8.6      20 Sep 2024 05:30  Phos  2.8     09-20  Mg     1.8     09-20      PT/INR - ( 20 Sep 2024 05:30 )   PT: 27.1 sec;   INR: 2.44          PTT - ( 20 Sep 2024 05:30 )  PTT:60.3 sec        ---------------------------------------------------------------------------  PLEASE CHECK WHEN PRESENT:     [  ]Heart Failure     [  ] Acute     [  ] Acute on Chronic     [  ] Chronic  -------------------------------------------------------------------     [x  ]Diastolic [HFpEF]     [  ]Systolic [HFrEF]     [  ]Combined [HFpEF & HFrEF]  .................................................................................     [  ]Other:     [ ] Pulmonary Hypertension     [ ] Chronic A-fib     [ ] Persistet A-fib     [ ] Permanent A-fib     [x ] Paroxysmal A-fib     [x] Hypertensive Heart Disease  -------------------------------------------------------------------  [ ] Respiratory failure  [ ] Acute cor pulmonale  [ ] Asthma/COPD Exacerbation  [ ]COPD on home O2 (Chronic renal Failure)   [ ] Pleural effusion  [ ] Aspiration pneumonia  [x ] Obstructive Sleep Apnea  [ ]Atelectasis   [ ] Acute PE   -------------------------------------------------------------------  [  ]Acute Kidney Injury      [  ]Acute Tubular Necrosis      [  ]Reneal Medullary Necrosis     [  ]Renal Cortical Necrosis     [  ]Other Pathological Lesions:    [  ]CKD 1  [  ]CKD 2  [  ]CKD 3  [  ]CKD 4  [  ]CKD 5 (ESRD)  [  ]Other  -------------------------------------------------------------------  [  ]Diabetes  [  ] Diabetic PVD Ulcer  [  ] Neuropathic ulcer to DM  [  ] Diabetes with Nephropathy  [  ] Osteomyelitis due to diabetes  [  ] Hyperglycemia   [  ]hypoglycemia   --------------------------------------------------------------------  [  ]Malnutrition: See Nutrition Note  [  ]Cachexia  [  ]Other:   [  ]Supplement Ordered:  [  ]Morbid Obesity (BMI >=40]  [ ] Ileus  ---------------------------------------------------------------------  [ ] Sepsis/severe sepsis/septic shock  [ ] Noninfectious SIRS  [ ] UTI  [ ] Pneumonia  [ ] Thrombophlebitis     -----------------------------------------------------------------------  [ ] Acidosis/alkalosis  [ ] Fluid overload  [ ] Hypokalemia  [ ] Hyperkalemia  [x ] Hypomagnesemia  [ ] Hypophosphatemia  [ ] Hyperphosphatemia  ------------------------------------------------------------------------  [ ] Acute blood loss anemia  [ ] Post op blood loss anemia  [ ] Iron deficiency anemia  [ ] Anemia due to chronic disease  [ ] Hypercoagulable state  [x ] Thrombocytopenia  ----------------------------------------------------------------------  [x ] Cerebral infarction  [ ] Transient ischemia attack  [ ] Encephalopathy - Toxic or Metabolic    A/P: 81yo M former smoker, with pmhx of HTN, HLD, COPD, ENEDINA (home bipap), AVR (mechanical - on Coumadin goal 2.5-3.5), HFpEF, paroxysmal Afib, PAD, ITP (followed by hematologist Dr. Aden Saeed, baseline platelets 70k), CKD  (baseline Cr 1.2-1.3), scoliosis, cardiac arrest in 1995 2/2 V Tach s/p ICD placed, cardiac arrest early 2000s received shocks from  ICD, b/l carotid stenosis and stroke (R subacute to chronic infarct in occipital lobe, and small infarcts, ?age in R thalamus, and R MCA/PCA watershed). Carotid duplex done in office with R ICA >80% stenosis with soft plaque, velocities 501/111 cm/s, L ICA with <50% stenosis, velocities up to 122/38 cm/s. Per daughter, 1 month ago patient began complaining of intermittent LUE numbness, and then began having LUE weakness along with LLE numbness and mild weakness 1 week later which prompted further work up. Patient then found to have stroke on CT. Patient planned for cardiac cath 9/20 and R CEA on Monday 9/23. Of note, last INR 2.9. Admitted to vascular surgery service.    Vascular/Carotid Stenosis  - Plan for R CEA Monday 9/23  - Plan for cardiac cath 9/20 for preop cardiac eval, added on with Dr. Guanako Thakkar  - Will d/w cards and heme re antiplatelet/anticoagulation plan after cardiac cath  - Platelets on hold for OR    AVR (mechanical valve)  - Outpatient INR goal 2.5-3.5  - INR on admission 2.74, s/p vitamin K 2.5 mg PO 9/19  - Holding home coumadin, last dose 9/17  - c/w hep gtt, monitor PTT, goal PTT 60-80    HFpEF  - 9/11/24: OG negative for thrombi, EF 50-55%  - c/w Spironolactone  - c/w atenolol  - Holding Lasix, Losartan periop    HTN/HLD  - c/w Atenolol  - c/w Atorvastatin  - holding Lasix, Losartan periop    COPD  - c/w Albuterol  - c/w Spiriva  - duoneb prior to procedure  - c/w home bipap   - follows with Pulmonologist, Dr. 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 Saeed  - Per Dr. Saeed, cleared to proceed with CEA and continue current anticoagulation regimen so long as platelets >50k in the setting of suspected ITP    Anemia  MGUS  - Patient reports history of CARLEY, but on recent labwork obtained by Dr. Saeed, he is currently Iron replete. Iron supplementation discontinued at that time.   - Outpatient labs show trend towards progressive anemia with low positive haptoglobin.   - Dr. Saeed planning possible outpatient bone marrow biopsy to evaluate for MM and other causes of anemia.   - Will discuss inpatient heme consult given progression of thrombocytopenia (57k here from 70s) and anemia in the preoperative setting; may not be needed given thorough ongoing outpatient workup.  - Will update iron studies and hemolysis labs.     Diet: NPO pMN for procedure  Activity: as stu  DVTppx: hep gtt  Dispo: pending cardiac cath and R CEA                 O/N: plan for cardiac cath in AM (added on Dr. Thakkar), preop done, INR 2.7, vit K, hep gtt at 8cc/hr, Mg 1.4, K 3.8, repleted, platelets 57 (baseline 70s), Cr 1.4 (baseline 1.2-1.3), NS at 50cc/hr preop hydration. CXR done 9/16. EKG paced rhythm.      Subjective: patient seen and examined at bedside, no acute complaints. in agreement with plan for cardiac cath and R CEA Monday.    ROS:   Denies Headache, blurred vision, Chest Pain, SOB, Abdominal pain, nausea or vomiting     Social   aMIOdarone    Tablet 100  atenolol  Tablet 12.5  heparin  Infusion 800  spironolactone 25      Allergies    Toprol-XL (Unknown)    Intolerances        Vital Signs Last 24 Hrs  T(C): 36.6 (20 Sep 2024 05:50), Max: 36.6 (20 Sep 2024 05:50)  T(F): 97.9 (20 Sep 2024 05:50), Max: 97.9 (20 Sep 2024 05:50)  HR: 50 (20 Sep 2024 05:50) (48 - 56)  BP: 172/66 (20 Sep 2024 05:50) (156/70 - 172/66)  BP(mean): 101 (20 Sep 2024 05:50) (100 - 103)  RR: 18 (20 Sep 2024 05:50) (18 - 20)  SpO2: 93% (20 Sep 2024 05:50) (93% - 100%)    Parameters below as of 20 Sep 2024 05:50  Patient On (Oxygen Delivery Method): BiPAP/CPAP      I&O's Summary    19 Sep 2024 07:01  -  20 Sep 2024 07:00  --------------------------------------------------------  IN: 572 mL / OUT: 400 mL / NET: 172 mL        PHYSICAL EXAM:  General: NAD, pt resting comfortably in bed  Neuro: A&Ox3, strength 5/5x4, motor/sensory grossly intact, symmetrical smile, no facial droop, tongue midline  Pulm: No respiratory distress, nonlabored breathing, on room air  CVS: NSR, HDS  Abd: Soft, NT, ND  Extremities: WWP, mild pitting edema L>R      LABS:                        8.8    6.15  )-----------( 50       ( 20 Sep 2024 05:30 )             29.1     09-20    137  |  107  |  19  ----------------------------<  104[H]  4.3   |  24  |  1.33[H]    Ca    8.6      20 Sep 2024 05:30  Phos  2.8     09-20  Mg     1.8     09-20      PT/INR - ( 20 Sep 2024 05:30 )   PT: 27.1 sec;   INR: 2.44          PTT - ( 20 Sep 2024 05:30 )  PTT:60.3 sec        ---------------------------------------------------------------------------  PLEASE CHECK WHEN PRESENT:     [  ]Heart Failure     [  ] Acute     [  ] Acute on Chronic     [  ] Chronic  -------------------------------------------------------------------     [x  ]Diastolic [HFpEF]     [  ]Systolic [HFrEF]     [  ]Combined [HFpEF & HFrEF]  .................................................................................     [  ]Other:     [ ] Pulmonary Hypertension     [ ] Chronic A-fib     [ ] Persistet A-fib     [ ] Permanent A-fib     [x ] Paroxysmal A-fib     [x] Hypertensive Heart Disease  -------------------------------------------------------------------  [ ] Respiratory failure  [ ] Acute cor pulmonale  [ ] Asthma/COPD Exacerbation  [ ]COPD on home O2 (Chronic renal Failure)   [ ] Pleural effusion  [ ] Aspiration pneumonia  [x ] Obstructive Sleep Apnea  [ ]Atelectasis   [ ] Acute PE   -------------------------------------------------------------------  [  ]Acute Kidney Injury      [  ]Acute Tubular Necrosis      [  ]Reneal Medullary Necrosis     [  ]Renal Cortical Necrosis     [  ]Other Pathological Lesions:    [  ]CKD 1  [  ]CKD 2  [  ]CKD 3  [  ]CKD 4  [  ]CKD 5 (ESRD)  [  ]Other  -------------------------------------------------------------------  [  ]Diabetes  [  ] Diabetic PVD Ulcer  [  ] Neuropathic ulcer to DM  [  ] Diabetes with Nephropathy  [  ] Osteomyelitis due to diabetes  [  ] Hyperglycemia   [  ]hypoglycemia   --------------------------------------------------------------------  [  ]Malnutrition: See Nutrition Note  [  ]Cachexia  [  ]Other:   [  ]Supplement Ordered:  [  ]Morbid Obesity (BMI >=40]  [ ] Ileus  ---------------------------------------------------------------------  [ ] Sepsis/severe sepsis/septic shock  [ ] Noninfectious SIRS  [ ] UTI  [ ] Pneumonia  [ ] Thrombophlebitis     -----------------------------------------------------------------------  [ ] Acidosis/alkalosis  [ ] Fluid overload  [ ] Hypokalemia  [ ] Hyperkalemia  [x ] Hypomagnesemia  [ ] Hypophosphatemia  [ ] Hyperphosphatemia  ------------------------------------------------------------------------  [ ] Acute blood loss anemia  [ ] Post op blood loss anemia  [ ] Iron deficiency anemia  [ ] Anemia due to chronic disease  [ ] Hypercoagulable state  [x ] Thrombocytopenia  ----------------------------------------------------------------------  [x ] Cerebral infarction  [ ] Transient ischemia attack  [ ] Encephalopathy - Toxic or Metabolic    A/P: 81yo M former smoker, with pmhx of HTN, HLD, COPD, ENEDINA (home bipap), AVR (mechanical - on Coumadin goal 2.5-3.5), HFpEF, paroxysmal Afib, PAD, ITP (followed by hematologist Dr. Aden Saeed, baseline platelets 70k), CKD  (baseline Cr 1.2-1.3), scoliosis, cardiac arrest in 1995 2/2 V Tach s/p ICD placed, cardiac arrest early 2000s received shocks from  ICD, b/l carotid stenosis and stroke (R subacute to chronic infarct in occipital lobe, and small infarcts, ?age in R thalamus, and R MCA/PCA watershed). Carotid duplex done in office with R ICA >80% stenosis with soft plaque, velocities 501/111 cm/s, L ICA with <50% stenosis, velocities up to 122/38 cm/s. Per daughter, 1 month ago patient began complaining of intermittent LUE numbness, and then began having LUE weakness along with LLE numbness and mild weakness 1 week later which prompted further work up. Patient then found to have stroke on CT. Patient planned for cardiac cath 9/20 and R CEA on Monday 9/23. Of note, last INR 2.9. Admitted to vascular surgery service.    Vascular/Carotid Stenosis  - Plan for R CEA Monday 9/23  - Plan for cardiac cath 9/20 for preop cardiac eval, added on with Dr. Guanako Thakkar  - Will d/w cards and heme re antiplatelet/anticoagulation plan after cardiac cath  - Platelets on hold for OR    AVR (mechanical valve)  - Outpatient INR goal 2.5-3.5  - INR on admission 2.74, s/p vitamin K 2.5 mg PO 9/19  - Holding home coumadin, last dose 9/17  - c/w hep gtt, monitor PTT, goal PTT 60-80    HFpEF  - 9/11/24: OG negative for thrombi, EF 50-55%  - c/w Spironolactone  - c/w atenolol  - Holding Lasix, Losartan periop    HTN/HLD  - c/w Atenolol  - c/w Atorvastatin  - holding Lasix, Losartan periop    COPD  - c/w Albuterol  - c/w Spiriva  - duoneb prior to procedure  - c/w home bipap   - follows with Pulmonologist, Dr. 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 Saeed  - Per Dr. Saeed, cleared to proceed with CEA and continue current anticoagulation regimen so long as platelets >50k in the setting of suspected ITP    Anemia  MGUS  - Patient reports history of CARLEY, but on recent labwork obtained by Dr. Saeed, he is currently Iron replete. Iron supplementation discontinued at that time.   - Outpatient labs show trend towards progressive anemia with low positive haptoglobin.   - Dr. Saeed planning possible outpatient bone marrow biopsy to evaluate for MM and other causes of anemia.   - Will discuss inpatient heme consult given progression of thrombocytopenia (57k here from 70s) and anemia in the preoperative setting; may not be needed given thorough ongoing outpatient workup.  - Will update iron studies and hemolysis labs.     Diet: NPO pMN for procedure  Activity: as stu  DVTppx: hep gtt  Dispo: pending cardiac cath and R CEA                 O/N: plan for cardiac cath in AM (added on Dr. Thakkar), preop done, INR 2.7, vit K, hep gtt at 8cc/hr, Mg 1.4, K 3.8, repleted, platelets 57 (baseline 70s), Cr 1.4 (baseline 1.2-1.3), NS at 50cc/hr preop hydration. CXR done 9/16. EKG paced rhythm.      Subjective: patient seen and examined at bedside, no acute complaints. in agreement with plan for cardiac cath and R CEA Monday.    ROS:   Denies Headache, blurred vision, Chest Pain, SOB, Abdominal pain, nausea or vomiting     Social   aMIOdarone    Tablet 100  atenolol  Tablet 12.5  heparin  Infusion 800  spironolactone 25      Allergies    Toprol-XL (Unknown)    Intolerances        Vital Signs Last 24 Hrs  T(C): 36.6 (20 Sep 2024 05:50), Max: 36.6 (20 Sep 2024 05:50)  T(F): 97.9 (20 Sep 2024 05:50), Max: 97.9 (20 Sep 2024 05:50)  HR: 50 (20 Sep 2024 05:50) (48 - 56)  BP: 172/66 (20 Sep 2024 05:50) (156/70 - 172/66)  BP(mean): 101 (20 Sep 2024 05:50) (100 - 103)  RR: 18 (20 Sep 2024 05:50) (18 - 20)  SpO2: 93% (20 Sep 2024 05:50) (93% - 100%)    Parameters below as of 20 Sep 2024 05:50  Patient On (Oxygen Delivery Method): BiPAP/CPAP      I&O's Summary    19 Sep 2024 07:01  -  20 Sep 2024 07:00  --------------------------------------------------------  IN: 572 mL / OUT: 400 mL / NET: 172 mL        PHYSICAL EXAM:  General: NAD, pt resting comfortably in bed  Neuro: A&Ox3, strength 5/5x4, motor/sensory grossly intact, symmetrical smile, no facial droop, tongue midline  Pulm: No respiratory distress, nonlabored breathing, on room air  CVS: NSR, HDS  Abd: Soft, NT, ND  Extremities: WWP, mild pitting edema L>R      LABS:                        8.8    6.15  )-----------( 50       ( 20 Sep 2024 05:30 )             29.1     09-20    137  |  107  |  19  ----------------------------<  104[H]  4.3   |  24  |  1.33[H]    Ca    8.6      20 Sep 2024 05:30  Phos  2.8     09-20  Mg     1.8     09-20      PT/INR - ( 20 Sep 2024 05:30 )   PT: 27.1 sec;   INR: 2.44          PTT - ( 20 Sep 2024 05:30 )  PTT:60.3 sec        ---------------------------------------------------------------------------  PLEASE CHECK WHEN PRESENT:     [  ]Heart Failure     [  ] Acute     [  ] Acute on Chronic     [  ] Chronic  -------------------------------------------------------------------     [x  ]Diastolic [HFpEF]     [  ]Systolic [HFrEF]     [  ]Combined [HFpEF & HFrEF]  .................................................................................     [  ]Other:     [ ] Pulmonary Hypertension     [ ] Chronic A-fib     [ ] Persistet A-fib     [ ] Permanent A-fib     [x ] Paroxysmal A-fib     [x] Hypertensive Heart Disease  -------------------------------------------------------------------  [ ] Respiratory failure  [ ] Acute cor pulmonale  [ ] Asthma/COPD Exacerbation  [ ]COPD on home O2 (Chronic renal Failure)   [ ] Pleural effusion  [ ] Aspiration pneumonia  [x ] Obstructive Sleep Apnea  [ ]Atelectasis   [ ] Acute PE   -------------------------------------------------------------------  [  ]Acute Kidney Injury      [  ]Acute Tubular Necrosis      [  ]Reneal Medullary Necrosis     [  ]Renal Cortical Necrosis     [  ]Other Pathological Lesions:    [  ]CKD 1  [  ]CKD 2  [  ]CKD 3  [  ]CKD 4  [  ]CKD 5 (ESRD)  [  ]Other  -------------------------------------------------------------------  [  ]Diabetes  [  ] Diabetic PVD Ulcer  [  ] Neuropathic ulcer to DM  [  ] Diabetes with Nephropathy  [  ] Osteomyelitis due to diabetes  [  ] Hyperglycemia   [  ]hypoglycemia   --------------------------------------------------------------------  [  ]Malnutrition: See Nutrition Note  [  ]Cachexia  [  ]Other:   [  ]Supplement Ordered:  [  ]Morbid Obesity (BMI >=40]  [ ] Ileus  ---------------------------------------------------------------------  [ ] Sepsis/severe sepsis/septic shock  [ ] Noninfectious SIRS  [ ] UTI  [ ] Pneumonia  [ ] Thrombophlebitis     -----------------------------------------------------------------------  [ ] Acidosis/alkalosis  [ ] Fluid overload  [ ] Hypokalemia  [ ] Hyperkalemia  [x ] Hypomagnesemia  [ ] Hypophosphatemia  [ ] Hyperphosphatemia  ------------------------------------------------------------------------  [ ] Acute blood loss anemia  [ ] Post op blood loss anemia  [ ] Iron deficiency anemia  [ ] Anemia due to chronic disease  [ ] Hypercoagulable state  [x ] Thrombocytopenia  ----------------------------------------------------------------------  [x ] Cerebral infarction  [ ] Transient ischemia attack  [ ] Encephalopathy - Toxic or Metabolic    A/P: 83yo M former smoker, with pmhx of HTN, HLD, COPD, ENEDINA (home bipap), AVR (mechanical - on Coumadin goal 2.5-3.5), HFpEF, paroxysmal Afib, PAD, ITP (followed by hematologist Dr. Aden Saeed, baseline platelets 70k), CKD  (baseline Cr 1.2-1.3), scoliosis, cardiac arrest in 1995 2/2 V Tach s/p ICD placed, cardiac arrest early 2000s received shocks from  ICD, b/l carotid stenosis and stroke (R subacute to chronic infarct in occipital lobe, and small infarcts, ?age in R thalamus, and R MCA/PCA watershed). Carotid duplex done in office with R ICA >80% stenosis with soft plaque, velocities 501/111 cm/s, L ICA with <50% stenosis, velocities up to 122/38 cm/s. Per daughter, 1 month ago patient began complaining of intermittent LUE numbness, and then began having LUE weakness along with LLE numbness and mild weakness 1 week later which prompted further work up. Patient then found to have stroke on CT. Patient planned for cardiac cath 9/20 and R CEA on Monday 9/23. Of note, last INR 2.9. Admitted to vascular surgery service.    Vascular/Carotid Stenosis  - Plan for R CEA Monday 9/23  - Plan for cardiac cath 9/20 for preop cardiac eval, added on with Dr. Guanako Thakkar  - Will d/w cards and heme re antiplatelet/anticoagulation plan after cardiac cath  - Platelets on hold for OR    AVR (mechanical valve)  - Outpatient INR goal 2.5-3.5  - INR on admission 2.74, s/p vitamin K 2.5 mg PO 9/19  - Holding home coumadin, last dose 9/17  - c/w hep gtt, monitor PTT, goal PTT 60-80    HFpEF  - 9/11/24: OG negative for thrombi, EF 50-55%  - c/w Spironolactone  - c/w atenolol  - Holding Lasix, Losartan periop    HTN/HLD  - c/w Atenolol  - c/w Atorvastatin  - holding Lasix, Losartan periop    COPD  - c/w Albuterol  - c/w Spiriva  - duoneb prior to procedure  - c/w home bipap   - follows with Pulmonologist, Dr. 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 Saeed  - Per Dr. Saeed, cleared to proceed with CEA and continue current anticoagulation regimen so long as platelets >50k in the setting of suspected ITP    Anemia  MGUS  - Patient reports history of CARLEY, but on recent labwork obtained by Dr. Saeed, he is currently iron wnl and iron supplementation discontinued on 9/9.  - Outpatient labs show trend towards progressive anemia with low positive haptoglobin, Dr. Saeed planning possible outpatient bone marrow biopsy to evaluate for MM and other causes of anemia  - Will discuss with Dr. Saeed re thrombocytopenia and anemia in periop setting  - Will update iron studies and hemolysis labs    Diet: NPO pMN for procedure  Activity: as stu  DVTppx: hep gtt  Dispo: pending cardiac cath and R CEA                 O/N: plan for cardiac cath in AM (added on Dr. Thakkar), preop done, INR 2.7, vit K, hep gtt at 8cc/hr, Mg 1.4, K 3.8, repleted, platelets 57 (baseline 70s), Cr 1.4 (baseline 1.2-1.3), NS at 50cc/hr preop hydration. CXR done 9/16. EKG paced rhythm.      Subjective: patient seen and examined at bedside, no acute complaints. in agreement with plan for cardiac cath and R CEA Monday.    ROS:   Denies Headache, blurred vision, Chest Pain, SOB, Abdominal pain, nausea or vomiting     Social   aMIOdarone    Tablet 100  atenolol  Tablet 12.5  heparin  Infusion 800  spironolactone 25      Allergies    Toprol-XL (Unknown)    Intolerances        Vital Signs Last 24 Hrs  T(C): 36.6 (20 Sep 2024 05:50), Max: 36.6 (20 Sep 2024 05:50)  T(F): 97.9 (20 Sep 2024 05:50), Max: 97.9 (20 Sep 2024 05:50)  HR: 50 (20 Sep 2024 05:50) (48 - 56)  BP: 172/66 (20 Sep 2024 05:50) (156/70 - 172/66)  BP(mean): 101 (20 Sep 2024 05:50) (100 - 103)  RR: 18 (20 Sep 2024 05:50) (18 - 20)  SpO2: 93% (20 Sep 2024 05:50) (93% - 100%)    Parameters below as of 20 Sep 2024 05:50  Patient On (Oxygen Delivery Method): BiPAP/CPAP      I&O's Summary    19 Sep 2024 07:01  -  20 Sep 2024 07:00  --------------------------------------------------------  IN: 572 mL / OUT: 400 mL / NET: 172 mL        PHYSICAL EXAM:  General: NAD, pt resting comfortably in bed  Neuro: A&Ox3, strength 5/5x4, motor/sensory grossly intact, symmetrical smile, no facial droop, tongue midline  Pulm: No respiratory distress, nonlabored breathing, on room air  CVS: NSR, HDS  Abd: Soft, NT, ND  Extremities: WWP, mild pitting edema L>R      LABS:                        8.8    6.15  )-----------( 50       ( 20 Sep 2024 05:30 )             29.1     09-20    137  |  107  |  19  ----------------------------<  104[H]  4.3   |  24  |  1.33[H]    Ca    8.6      20 Sep 2024 05:30  Phos  2.8     09-20  Mg     1.8     09-20      PT/INR - ( 20 Sep 2024 05:30 )   PT: 27.1 sec;   INR: 2.44          PTT - ( 20 Sep 2024 05:30 )  PTT:60.3 sec        ---------------------------------------------------------------------------  PLEASE CHECK WHEN PRESENT:     [  ]Heart Failure     [  ] Acute     [  ] Acute on Chronic     [  ] Chronic  -------------------------------------------------------------------     [x  ]Diastolic [HFpEF]     [  ]Systolic [HFrEF]     [  ]Combined [HFpEF & HFrEF]  .................................................................................     [  ]Other:     [ ] Pulmonary Hypertension     [ ] Chronic A-fib     [ ] Persistet A-fib     [ ] Permanent A-fib     [x ] Paroxysmal A-fib     [x] Hypertensive Heart Disease  -------------------------------------------------------------------  [ ] Respiratory failure  [ ] Acute cor pulmonale  [ ] Asthma/COPD Exacerbation  [ ]COPD on home O2 (Chronic renal Failure)   [ ] Pleural effusion  [ ] Aspiration pneumonia  [x ] Obstructive Sleep Apnea  [ ]Atelectasis   [ ] Acute PE   -------------------------------------------------------------------  [  ]Acute Kidney Injury      [  ]Acute Tubular Necrosis      [  ]Reneal Medullary Necrosis     [  ]Renal Cortical Necrosis     [  ]Other Pathological Lesions:    [  ]CKD 1  [  ]CKD 2  [  ]CKD 3  [  ]CKD 4  [  ]CKD 5 (ESRD)  [  ]Other  -------------------------------------------------------------------  [  ]Diabetes  [  ] Diabetic PVD Ulcer  [  ] Neuropathic ulcer to DM  [  ] Diabetes with Nephropathy  [  ] Osteomyelitis due to diabetes  [  ] Hyperglycemia   [  ]hypoglycemia   --------------------------------------------------------------------  [  ]Malnutrition: See Nutrition Note  [  ]Cachexia  [  ]Other:   [  ]Supplement Ordered:  [  ]Morbid Obesity (BMI >=40]  [ ] Ileus  ---------------------------------------------------------------------  [ ] Sepsis/severe sepsis/septic shock  [ ] Noninfectious SIRS  [ ] UTI  [ ] Pneumonia  [ ] Thrombophlebitis     -----------------------------------------------------------------------  [ ] Acidosis/alkalosis  [ ] Fluid overload  [ ] Hypokalemia  [ ] Hyperkalemia  [x ] Hypomagnesemia  [ ] Hypophosphatemia  [ ] Hyperphosphatemia  ------------------------------------------------------------------------  [ ] Acute blood loss anemia  [ ] Post op blood loss anemia  [ ] Iron deficiency anemia  [ ] Anemia due to chronic disease  [ ] Hypercoagulable state  [x ] Thrombocytopenia  ----------------------------------------------------------------------  [x ] Cerebral infarction  [ ] Transient ischemia attack  [ ] Encephalopathy - Toxic or Metabolic    A/P: 81yo M former smoker, with pmhx of HTN, HLD, COPD, ENEDINA (home bipap), AVR (mechanical - on Coumadin goal 2.5-3.5), HFpEF, paroxysmal Afib, PAD, ITP (followed by hematologist Dr. Aden Saeed, baseline platelets 70k), CKD  (baseline Cr 1.2-1.3), scoliosis, cardiac arrest in 1995 2/2 V Tach s/p ICD placed, cardiac arrest early 2000s received shocks from  ICD, b/l carotid stenosis and stroke (R subacute to chronic infarct in occipital lobe, and small infarcts, ?age in R thalamus, and R MCA/PCA watershed). Carotid duplex done in office with R ICA >80% stenosis with soft plaque, velocities 501/111 cm/s, L ICA with <50% stenosis, velocities up to 122/38 cm/s. Per daughter, 1 month ago patient began complaining of intermittent LUE numbness, and then began having LUE weakness along with LLE numbness and mild weakness 1 week later which prompted further work up. Patient then found to have stroke on CT. Patient planned for cardiac cath 9/20 and R CEA on Monday 9/23. Of note, last INR 2.9. Admitted to vascular surgery service.    Vascular/Carotid Stenosis  - Plan for R CEA Monday 9/23  - Plan for cardiac cath 9/20 for preop cardiac eval, added on with Dr. Guanako Thakkar  - Will d/w cards and heme re antiplatelet/anticoagulation plan after cardiac cath  - Platelets on hold for OR    AVR (mechanical valve)  - Outpatient INR goal 2.5-3.5  - INR on admission 2.74, s/p vitamin K 2.5 mg PO 9/19  - Holding home coumadin, last dose 9/17  - c/w hep gtt, monitor PTT, goal PTT 60-80    HFpEF  - 9/11/24: OG negative for thrombi, EF 50-55%  - c/w Spironolactone  - c/w atenolol  - Holding Lasix, Losartan periop    HTN/HLD  - c/w Atenolol  - c/w Atorvastatin  - holding Lasix, Losartan periop    COPD  - c/w Albuterol  - c/w Spiriva  - duoneb prior to procedure  - c/w home bipap   - follows with Pulmonologist, Dr. Wright  - CT Chest ([facility]) 03/16/23: Bilateral apical pleural parenchymal thickening. Mild paraseptal emphysema and centrilobular emphysema. There are mild paraseptal emphysema and centrilobular emphysema.   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 Saeed  - Per Dr. Saeed, cleared to proceed with CEA and continue current anticoagulation regimen so long as platelets >50k in the setting of suspected ITP    Anemia  MGUS  - Patient reports history of CARLEY, but on recent labwork obtained by Dr. Saeed, he is currently iron wnl and iron supplementation discontinued on 9/9.  - Outpatient labs show trend towards progressive anemia with low positive haptoglobin, Dr. Saeed planning possible outpatient bone marrow biopsy to evaluate for MM and other causes of anemia  - Will discuss with Dr. Saeed re thrombocytopenia and anemia in periop setting  - Will update iron studies and hemolysis labs    Diet: NPO pMN for procedure  Activity: as stu  DVTppx: hep gtt  Dispo: pending cardiac cath and R CEA

## 2024-09-20 NOTE — PROGRESS NOTE ADULT - ASSESSMENT
84M, former smoker, with hx of Severe AS s/p mechanical AVR (1993, on Coumadin), HTN, HLD, HFpEF (EF 50-55%, 9/11/24), Cardiac arrest x2 2/2 VT s/p ICD (1995), Afib, ITP (unknown baseline PLT), COPD, and ENEDINA (on CPAP), presenting with for Rt CEA with Vascular. Plan for LHC as part of pre-op.     - VSS; denies bleeding, GI bleeding, hematemesis, hematuria, BRBPR or melena  - Sedation Type: Moderate  - Is Patient a good candidate for sedation: YES  - H 8.8/H 29.1, Plt 50 (previously in 70s per daughter) -- DAPT: No load per attending Dr. Thakkar   - INR 2.74 --> 2.44 s/p IV Vit K 2.5mg -- AC: Continue to hold home Coumadin; Hold Heparin gtt on call to cath lab  - Fluids: NS 50cc x 12h   - NPO except sips with meds      Risks & benefits of procedure and alternative therapy have been explained to the patient including but not limited to: allergic reaction, bleeding w/possible need for blood transfusion, infection, renal and vascular compromise, limb damage, arrhythmia, stroke, vessel dissection/perforation, Myocardial infarction, emergent CABG. Informed consent obtained and in chart.

## 2024-09-20 NOTE — PROGRESS NOTE ADULT - SUBJECTIVE AND OBJECTIVE BOX
INTERVENTIONAL CARDIOLOGY PA PRE-CATH NOTE    HPI:    84M, former smoker, with hx of Severe AS s/p mechanical AVR (1993, on Coumadin), HTN, HLD, HFpEF (EF 50-55%, 9/11/24), Cardiac arrest x2 2/2 VT s/p ICD (1995), Afib, ITP (unknown baseline PLT), COPD, and ENEDINA (on CPAP), presenting with for Rt CEA with Vascular.     Of note, pt was seen by Neuro on 9/04/24 given "stuttering" symptoms as well as progressively worsening LUE/LLE numbness and weakness. Pt underwent Head/Neck CTA (9/04/24): subacute to. 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 Per Neuro's note, it is not recommended for patient to be on ASA 81mg given he is on Coumadin as well as due to recent Hgb drop.    Given above imaging findings, pt was referred to Vascular Surgery for Rt CEA. Prior to planned procedure, pt will need a pre-op LHC.    TTE (9/11/24): EF 50-55%. Mechanical valve seen in aortic position with apparent normal function and w/o evidence of prosthetic dysfunction    PMH:      ·	Severe AS s/p Mechanical AVR (on Coumadin, 1993)  ·	HTN  ·	HLD  ·	HFpEF  ·	Cardiac arrest 2/2 VT s/p ICD   ·	Afib  ·	ITP   ·	COPD  ·	ENEDINA       ALL: NKDA, NKFA. Denies shellfish/Contrast dye allergy    SHX: Former smoker     FHx: No significant family hx    MEDICATIONS:   acetaminophen     Tablet .. 650 milliGRAM(s) Oral every 6 hours PRN  albuterol    90 MICROgram(s) HFA Inhaler 2 Puff(s) Inhalation every 6 hours PRN  albuterol/ipratropium for Nebulization. 3 milliLiter(s) Nebulizer once  aMIOdarone    Tablet 100 milliGRAM(s) Oral daily  atenolol  Tablet 12.5 milliGRAM(s) Oral daily  atorvastatin 80 milliGRAM(s) Oral at bedtime  brimonidine 0.2% Ophthalmic Solution 1 Drop(s) Right EYE two times a day  dorzolamide 2% Ophthalmic Solution 1 Drop(s) Right EYE every 8 hours  heparin  Infusion 800 Unit(s)/Hr IV Continuous <Continuous>  latanoprost 0.005% Ophthalmic Solution 1 Drop(s) Both EYES at bedtime  magnesium sulfate  IVPB 1 Gram(s) IV Intermittent once  pantoprazole    Tablet 40 milliGRAM(s) Oral before breakfast  phytonadione  IVPB 2.5 milliGRAM(s) IV Intermittent once  sodium chloride 0.9%. 1000 milliLiter(s) IV Continuous <Continuous>  spironolactone 25 milliGRAM(s) Oral daily  tamsulosin 0.4 milliGRAM(s) Oral at bedtime  tiotropium 2.5 MICROgram(s) Inhaler 2 Puff(s) Inhalation daily      VITALS:  T(C): 36.6 (09-20-24 @ 05:50), Max: 36.6 (09-20-24 @ 05:50)  HR: 50 (09-20-24 @ 05:50) (48 - 56)  BP: 172/66 (09-20-24 @ 05:50) (156/70 - 172/66)  RR: 18 (09-20-24 @ 05:50) (18 - 20)  SpO2: 93% (09-20-24 @ 05:50) (93% - 100%)  Wt(kg): --    PHYSICAL EXAM:  HENT: NCAT, EOMI, PEERLA  CV: RRR, S1/S2. No JVD. No murmurs, rubs or gallops   PULM: CTA B/L. No wheezing, rales, or rhonchi   ABD: Soft, NT/ND, +BS throughout   EXT: Warm, trace LE edema  VASC:   ·	Radial: +2 b/l   ·	Femoral: +2 b/l; no bruits appreciated   ·	DP/PT: +1 b/l and dopplerable   NEURO: AOx3; no focal neuro deficits        LABS:    - ITP: PLT previously in 70s (per pt and Daughter) --> 50   - HAILEY with Cr 1.33   - INR 2.74 --> 2.44 (Goal 2.5-3.5) s/p Vit K 2.5mg IV x1                          8.8    6.15  )-----------( 50       ( 20 Sep 2024 05:30 )             29.1     09-20    137  |  107  |  19  ----------------------------<  104[H]  4.3   |  24  |  1.33[H]    Ca    8.6      20 Sep 2024 05:30  Phos  2.8     09-20  Mg     1.8     09-20      PT/INR - ( 20 Sep 2024 05:30 )   PT: 27.1 sec;   INR: 2.44          PTT - ( 20 Sep 2024 05:30 )  PTT:60.3 sec

## 2024-09-21 LAB
ACANTHOCYTES BLD QL SMEAR: SIGNIFICANT CHANGE UP
ADD ON TEST-SPECIMEN IN LAB: SIGNIFICANT CHANGE UP
ALBUMIN SERPL ELPH-MCNC: 3.3 G/DL — SIGNIFICANT CHANGE UP (ref 3.3–5)
ALP SERPL-CCNC: 74 U/L — SIGNIFICANT CHANGE UP (ref 40–120)
ALT FLD-CCNC: 19 U/L — SIGNIFICANT CHANGE UP (ref 10–45)
ANION GAP SERPL CALC-SCNC: 9 MMOL/L — SIGNIFICANT CHANGE UP (ref 5–17)
ANISOCYTOSIS BLD QL: SIGNIFICANT CHANGE UP
APTT BLD: 43.5 SEC — HIGH (ref 24.5–35.6)
APTT BLD: 60.6 SEC — HIGH (ref 24.5–35.6)
APTT BLD: 67.2 SEC — HIGH (ref 24.5–35.6)
AST SERPL-CCNC: 23 U/L — SIGNIFICANT CHANGE UP (ref 10–40)
BILIRUB DIRECT SERPL-MCNC: 0.2 MG/DL — SIGNIFICANT CHANGE UP (ref 0–0.3)
BILIRUB INDIRECT FLD-MCNC: 0.5 MG/DL — SIGNIFICANT CHANGE UP (ref 0.2–1)
BILIRUB SERPL-MCNC: 0.8 MG/DL — SIGNIFICANT CHANGE UP (ref 0.2–1.2)
BUN SERPL-MCNC: 19 MG/DL — SIGNIFICANT CHANGE UP (ref 7–23)
BURR CELLS BLD QL SMEAR: PRESENT — SIGNIFICANT CHANGE UP
CALCIUM SERPL-MCNC: 8.6 MG/DL — SIGNIFICANT CHANGE UP (ref 8.4–10.5)
CHLORIDE SERPL-SCNC: 109 MMOL/L — HIGH (ref 96–108)
CO2 SERPL-SCNC: 23 MMOL/L — SIGNIFICANT CHANGE UP (ref 22–31)
CREAT SERPL-MCNC: 1.3 MG/DL — SIGNIFICANT CHANGE UP (ref 0.5–1.3)
EGFR: 54 ML/MIN/1.73M2 — LOW
GIANT PLATELETS BLD QL SMEAR: PRESENT — SIGNIFICANT CHANGE UP
GLUCOSE SERPL-MCNC: 123 MG/DL — HIGH (ref 70–99)
HCT VFR BLD CALC: 28.3 % — LOW (ref 39–50)
HGB BLD-MCNC: 8.6 G/DL — LOW (ref 13–17)
HYPOCHROMIA BLD QL: SIGNIFICANT CHANGE UP
INR BLD: 1.35 — HIGH (ref 0.85–1.18)
IRON SATN MFR SERPL: 28 % — SIGNIFICANT CHANGE UP (ref 16–55)
IRON SATN MFR SERPL: 67 UG/DL — SIGNIFICANT CHANGE UP (ref 45–165)
MACROCYTES BLD QL: SLIGHT — SIGNIFICANT CHANGE UP
MAGNESIUM SERPL-MCNC: 1.7 MG/DL — SIGNIFICANT CHANGE UP (ref 1.6–2.6)
MANUAL SMEAR VERIFICATION: SIGNIFICANT CHANGE UP
MCHC RBC-ENTMCNC: 25.9 PG — LOW (ref 27–34)
MCHC RBC-ENTMCNC: 30.4 GM/DL — LOW (ref 32–36)
MCV RBC AUTO: 85.2 FL — SIGNIFICANT CHANGE UP (ref 80–100)
METAMYELOCYTES # FLD: 1.7 % — HIGH (ref 0–0)
MICROCYTES BLD QL: SIGNIFICANT CHANGE UP
MYELOCYTES NFR BLD: 1.7 % — HIGH (ref 0–0)
NEUTS BAND # BLD: 1.7 % — SIGNIFICANT CHANGE UP (ref 0–8)
NRBC # BLD: 0 /100 WBCS — SIGNIFICANT CHANGE UP (ref 0–0)
NRBC # BLD: 1 /100 WBCS — HIGH (ref 0–0)
OVALOCYTES BLD QL SMEAR: SIGNIFICANT CHANGE UP
PHOSPHATE SERPL-MCNC: 2.3 MG/DL — LOW (ref 2.5–4.5)
PLAT MORPH BLD: ABNORMAL
PLATELET # BLD AUTO: 56 K/UL — LOW (ref 150–400)
POIKILOCYTOSIS BLD QL AUTO: SIGNIFICANT CHANGE UP
POLYCHROMASIA BLD QL SMEAR: SLIGHT — SIGNIFICANT CHANGE UP
POTASSIUM SERPL-MCNC: 4.3 MMOL/L — SIGNIFICANT CHANGE UP (ref 3.5–5.3)
POTASSIUM SERPL-SCNC: 4.3 MMOL/L — SIGNIFICANT CHANGE UP (ref 3.5–5.3)
PROT SERPL-MCNC: 6.1 G/DL — SIGNIFICANT CHANGE UP (ref 6–8.3)
PROTHROM AB SERPL-ACNC: 15.3 SEC — HIGH (ref 9.5–13)
RBC # BLD: 3.32 M/UL — LOW (ref 4.2–5.8)
RBC # FLD: 24.6 % — HIGH (ref 10.3–14.5)
RBC BLD AUTO: ABNORMAL
SCHISTOCYTES BLD QL AUTO: SIGNIFICANT CHANGE UP
SODIUM SERPL-SCNC: 141 MMOL/L — SIGNIFICANT CHANGE UP (ref 135–145)
SPHEROCYTES BLD QL SMEAR: SLIGHT — SIGNIFICANT CHANGE UP
TIBC SERPL-MCNC: 239 UG/DL — SIGNIFICANT CHANGE UP (ref 220–430)
UIBC SERPL-MCNC: 172 UG/DL — SIGNIFICANT CHANGE UP (ref 110–370)
WBC # BLD: 4.87 K/UL — SIGNIFICANT CHANGE UP (ref 3.8–10.5)
WBC # FLD AUTO: 4.87 K/UL — SIGNIFICANT CHANGE UP (ref 3.8–10.5)

## 2024-09-21 PROCEDURE — 93010 ELECTROCARDIOGRAM REPORT: CPT

## 2024-09-21 PROCEDURE — 99232 SBSQ HOSP IP/OBS MODERATE 35: CPT | Mod: GC

## 2024-09-21 PROCEDURE — 99233 SBSQ HOSP IP/OBS HIGH 50: CPT

## 2024-09-21 RX ORDER — SENNOSIDES 8.6 MG
1 TABLET ORAL AT BEDTIME
Refills: 0 | Status: DISCONTINUED | OUTPATIENT
Start: 2024-09-21 | End: 2024-09-21

## 2024-09-21 RX ORDER — DIPHENHYDRAMINE HCL 12.5MG/5ML
25 LIQUID (ML) ORAL ONCE
Refills: 0 | Status: COMPLETED | OUTPATIENT
Start: 2024-09-21 | End: 2024-09-21

## 2024-09-21 RX ORDER — SENNOSIDES 8.6 MG
2 TABLET ORAL AT BEDTIME
Refills: 0 | Status: DISCONTINUED | OUTPATIENT
Start: 2024-09-21 | End: 2024-09-21

## 2024-09-21 RX ORDER — ACETAMINOPHEN 325 MG
650 TABLET ORAL ONCE
Refills: 0 | Status: COMPLETED | OUTPATIENT
Start: 2024-09-21 | End: 2024-09-21

## 2024-09-21 RX ORDER — SENNOSIDES 8.6 MG
1 TABLET ORAL AT BEDTIME
Refills: 0 | Status: DISCONTINUED | OUTPATIENT
Start: 2024-09-21 | End: 2024-09-23

## 2024-09-21 RX ORDER — MAGNESIUM SULFATE 500 MG/ML
1 VIAL (ML) INJECTION ONCE
Refills: 0 | Status: COMPLETED | OUTPATIENT
Start: 2024-09-21 | End: 2024-09-21

## 2024-09-21 RX ORDER — POTASSIUM PHOSPHATE, MONOBASIC POTASSIUM PHOSPHATE, DIBASIC 224; 236 MG/ML; MG/ML
15 INJECTION, SOLUTION, CONCENTRATE INTRAVENOUS ONCE
Refills: 0 | Status: COMPLETED | OUTPATIENT
Start: 2024-09-21 | End: 2024-09-21

## 2024-09-21 RX ORDER — ANTI-ITCH CREAM 1 G/100G
1 OINTMENT TOPICAL AT BEDTIME
Refills: 0 | Status: DISCONTINUED | OUTPATIENT
Start: 2024-09-21 | End: 2024-09-23

## 2024-09-21 RX ADMIN — ANTI-ITCH CREAM 1 APPLICATION(S): 1 OINTMENT TOPICAL at 21:46

## 2024-09-21 RX ADMIN — ATORVASTATIN CALCIUM 80 MILLIGRAM(S): 10 TABLET, FILM COATED ORAL at 21:45

## 2024-09-21 RX ADMIN — Medication 1 DROP(S): at 21:46

## 2024-09-21 RX ADMIN — SPIRONOLACTONE 25 MILLIGRAM(S): 100 TABLET, FILM COATED ORAL at 11:02

## 2024-09-21 RX ADMIN — BRIMONIDINE TARTRATE 1 DROP(S): 1 SOLUTION/ DROPS OPHTHALMIC at 06:14

## 2024-09-21 RX ADMIN — AMIODARONE HYDROCHLORIDE 100 MILLIGRAM(S): 50 INJECTION, SOLUTION INTRAVENOUS at 11:00

## 2024-09-21 RX ADMIN — ATENOLOL 12.5 MILLIGRAM(S): 25 TABLET ORAL at 06:14

## 2024-09-21 RX ADMIN — Medication 1 DROP(S): at 06:14

## 2024-09-21 RX ADMIN — Medication 1 DROP(S): at 14:48

## 2024-09-21 RX ADMIN — Medication 17 GRAM(S): at 21:46

## 2024-09-21 RX ADMIN — TIOTROPIUM BROMIDE AND OLODATEROL 2 PUFF(S): 3.124; 2.736 SPRAY, METERED RESPIRATORY (INHALATION) at 11:03

## 2024-09-21 RX ADMIN — PANTOPRAZOLE SODIUM 40 MILLIGRAM(S): 40 TABLET, DELAYED RELEASE ORAL at 06:14

## 2024-09-21 RX ADMIN — Medication 25 MILLIGRAM(S): at 11:00

## 2024-09-21 RX ADMIN — LATANOPROST 1 DROP(S): 50 SOLUTION OPHTHALMIC at 21:46

## 2024-09-21 RX ADMIN — Medication 8 UNIT(S)/HR: at 04:09

## 2024-09-21 RX ADMIN — POTASSIUM PHOSPHATE, MONOBASIC POTASSIUM PHOSPHATE, DIBASIC 62.5 MILLIMOLE(S): 224; 236 INJECTION, SOLUTION, CONCENTRATE INTRAVENOUS at 16:23

## 2024-09-21 RX ADMIN — Medication 650 MILLIGRAM(S): at 11:01

## 2024-09-21 RX ADMIN — Medication 100 GRAM(S): at 12:24

## 2024-09-21 RX ADMIN — Medication 25 MILLIGRAM(S): at 01:43

## 2024-09-21 RX ADMIN — Medication 1 TABLET(S): at 02:02

## 2024-09-21 RX ADMIN — BRIMONIDINE TARTRATE 1 DROP(S): 1 SOLUTION/ DROPS OPHTHALMIC at 17:47

## 2024-09-21 RX ADMIN — Medication 9 UNIT(S)/HR: at 08:08

## 2024-09-21 RX ADMIN — Medication 30 MILLIGRAM(S): at 06:14

## 2024-09-21 RX ADMIN — IPRATROPIUM BROMIDE AND ALBUTEROL SULFATE 3 MILLILITER(S): .5; 3 SOLUTION RESPIRATORY (INHALATION) at 14:37

## 2024-09-21 RX ADMIN — IMMUNE GLOBULIN (HUMAN) 75 GRAM(S): 10 INJECTION INTRAVENOUS; SUBCUTANEOUS at 11:59

## 2024-09-21 RX ADMIN — Medication 1 TABLET(S): at 21:45

## 2024-09-21 RX ADMIN — Medication 0.4 MILLIGRAM(S): at 21:45

## 2024-09-21 NOTE — DISCHARGE NOTE PROVIDER - NSDCCPCAREPLAN_GEN_ALL_CORE_FT
PRINCIPAL DISCHARGE DIAGNOSIS  Diagnosis: Symptomatic carotid artery stenosis, right  Assessment and Plan of Treatment:       SECONDARY DISCHARGE DIAGNOSES  Diagnosis: CVA (cerebrovascular accident)  Assessment and Plan of Treatment:     Diagnosis: Former smoker  Assessment and Plan of Treatment:     Diagnosis: Hypertension  Assessment and Plan of Treatment:     Diagnosis: Hyperlipidemia  Assessment and Plan of Treatment:     Diagnosis: COPD (chronic obstructive pulmonary disease)  Assessment and Plan of Treatment:     Diagnosis: ENEDINA treated with BiPAP  Assessment and Plan of Treatment:     Diagnosis: S/P AVR (aortic valve replacement)  Assessment and Plan of Treatment:     Diagnosis: Chronic heart failure with preserved ejection fraction (HFpEF)  Assessment and Plan of Treatment:     Diagnosis: PAD (peripheral artery disease)  Assessment and Plan of Treatment:     Diagnosis: Paroxysmal atrial fibrillation  Assessment and Plan of Treatment:     Diagnosis: H/O cardiac arrest  Assessment and Plan of Treatment:     Diagnosis: S/P ICD (internal cardiac defibrillator) procedure  Assessment and Plan of Treatment:     Diagnosis: Idiopathic thrombocytopenic purpura (ITP)  Assessment and Plan of Treatment:     Diagnosis: Stage 3 chronic kidney disease  Assessment and Plan of Treatment:     Diagnosis: Glaucoma  Assessment and Plan of Treatment:     Diagnosis: Chronic vertigo  Assessment and Plan of Treatment:     Diagnosis: GERD (gastroesophageal reflux disease)  Assessment and Plan of Treatment:     Diagnosis: BPH (benign prostatic hyperplasia)  Assessment and Plan of Treatment:     Diagnosis: Normocytic anemia  Assessment and Plan of Treatment:     Diagnosis: MGUS (monoclonal gammopathy of unknown significance)  Assessment and Plan of Treatment:

## 2024-09-21 NOTE — PROGRESS NOTE ADULT - ASSESSMENT
82-year-old male with a PMHx of HTN, HLD, ENEDINA, COPD, AVR (on Warfarin), HFpEF, pAfib, PAD, ITP, CKD (baseline Cr 1.2 - 1.3), VTach (s/p ICD), CVA and FRANCY who presented for right CEA.      #Carotid Artery Stenosis    #PAD   -Continue  management as per vascular surgery, Pt s/p  LHC (9/20)   -Pt is for  right CEA (9/23)   -Continue management of anti-platelets and anti-coagulation as per primary team, cardiology and hematology    -currently om heparin gtt while holding warfarin; Will continue to monitor PTT    -Continue Atorvastatin 80mg qhs     #ITP   #MGUS   #Anemia   -Continue management by outpatient hematologist Dr. Saeed  -Spoke to pt's outpt GI doctor Dr. Velasquez and pt's daughter regarding pt's anemia. Per discussion the pt's Hgb has been downtrending since mid July.    -Will continue to monitor serial CBC, will repeat iron studies and check occult stool.     #pAfib   -currently on heparin gtt while warfarin is held   -continue with Atenolol 12.5mg daily     #HTN   -continue with Spironolactone 25mg daily and Atenolol 12.5mg daily   -Lasix and Losartan held in terri-operative period    #COPD   -continue with Stiolto and PRN duonebs     #ENEDINA   -continue with nocturnal BiPAP (settings 18/14 as per outpatient pulmonology note)     #CKD (baseline Cr 1.2 - 1.3)   -Cr at [facility]   -Continue IVF post cardiac catheterization      #BPH   -continue with Tamsulosin 0.4mg qhs     #History of VTach (s/p ICD)  -continue with Amiodarone 100mg daily     #DVT PPx  -Continue  heparin gtt    #Dispo:   -Pt is for right CEA on 9/23    50 minutes spent on total encounter. The necessity of the time spent during the encounter on this date of service was due to:    Review of hospital course, labs, vitals, medical records.  Bedside exam   Discussed plan of care with primary team   Documenting the encounter.   82-year-old male with a PMHx of HTN, HLD, ENEDINA, COPD, AVR (on Warfarin), HFpEF, pAfib, PAD, ITP, CKD (baseline Cr 1.2 - 1.3), VTach (s/p ICD), CVA and FRANCY who presented for right CEA.      #Carotid Artery Stenosis    #PAD   -Continue  management as per vascular surgery, Pt s/p  LHC (9/20)   -Pt is for  right CEA (9/23)   -Continue management of anti-platelets and anti-coagulation as per primary team, cardiology and hematology    -currently om heparin gtt while holding warfarin; Will continue to monitor PTT    -Continue Atorvastatin 80mg qhs     #ITP   #MGUS   #Anemia   -Continue management by outpatient hematologist Dr. Saeed  -Spoke to pt's outpt GI doctor Dr. Velasquez and pt's daughter regarding pt's anemia. Per discussion the pt's Hgb has been downtrending since mid July.    -Will continue to monitor serial CBC, will repeat iron studies and check occult stool.     #pAfib   -currently on heparin gtt while warfarin is held   -continue with Atenolol 12.5mg daily     #HTN   -continue with Spironolactone 25mg daily and Atenolol 12.5mg daily   -Lasix and Losartan held in terri-operative period    #COPD   -continue with Stiolto and PRN duonebs     #ENEDINA   -continue with nocturnal BiPAP (settings 18/14 as per outpatient pulmonology note)     #CKD (baseline Cr 1.2 - 1.3)   -Cr at [facility]   -Continue IVF post cardiac catheterization      #Rash   -Pt with rash on back and LLE unclear etiology ? IVIG  - Will start topical benadryl prn   -Derm consult in the AM     #BPH   -continue with Tamsulosin 0.4mg qhs     #History of VTach (s/p ICD)  -continue with Amiodarone 100mg daily     #DVT PPx  -Continue  heparin gtt    #Dispo:   -Pt is for right CEA on 9/23    50 minutes spent on total encounter. The necessity of the time spent during the encounter on this date of service was due to:    Review of hospital course, labs, vitals, medical records.  Bedside exam   Discussed plan of care with primary team as well as with pt's daughter and outpt GI Dr. Velasquez  Documenting the encounter.   82-year-old male with a PMHx of HTN, HLD, ENEDINA, COPD, AVR (on Warfarin), HFpEF, pAfib, PAD, ITP, CKD (baseline Cr 1.2 - 1.3), VTach (s/p ICD), CVA and FRANCY who presented for right CEA.      #Carotid Artery Stenosis    #PAD   -Continue  management as per vascular surgery, Pt s/p  LHC (9/20)   -Pt is for  right CEA (9/23)   -Continue management of anti-platelets and anti-coagulation as per primary team, cardiology and hematology    -currently om heparin gtt while holding warfarin; Will continue to monitor PTT    -Continue Atorvastatin 80mg qhs     #ITP   #MGUS   #Anemia   -Continue management by outpatient hematologist Dr. Saeed  -Spoke to pt's outpt GI doctor Dr. Velasquez and pt's daughter regarding pt's anemia. Per discussion the pt's Hgb has been downtrending since mid July.    -Will continue to monitor serial CBC, will repeat iron studies and check occult stool.     #pAfib   -currently on heparin gtt while warfarin is held   -continue with Atenolol 12.5mg daily     #HTN   -continue with Spironolactone 25mg daily and Atenolol 12.5mg daily   -Lasix and Losartan held in terri-operative period    #COPD   -continue with Stiolto and PRN duonebs     #ENEDINA   -continue with nocturnal BiPAP (settings 18/14 as per outpatient pulmonology note)     #CKD (baseline Cr 1.2 - 1.3)   -Cr at [facility]   -Continue IVF post cardiac catheterization      #Rash   -Pt with rash on back and LLE   - Will start topical hydrocortisone cream   -Derm consult in the AM if rash worsens     #BPH   -continue with Tamsulosin 0.4mg qhs     #History of VTach (s/p ICD)  -continue with Amiodarone 100mg daily     #DVT PPx  -Continue  heparin gtt    #Dispo:   -Pt is for right CEA on 9/23    50 minutes spent on total encounter. The necessity of the time spent during the encounter on this date of service was due to:    Review of hospital course, labs, vitals, medical records.  Bedside exam   Discussed plan of care with primary team as well as with pt's daughter and outpt GI Dr. Velasquez  Documenting the encounter.

## 2024-09-21 NOTE — PROGRESS NOTE ADULT - SUBJECTIVE AND OBJECTIVE BOX
O/N: Diagnostic cath with cardiology, no interventions, TR removed 1:30, heparin resumed at 3:30. pruritic rash per daughter, requesting ID/Derm eval, Benadryl given, Senna started.    Subjective:     ROS:   Denies Headache, blurred vision, Chest Pain, SOB, Abdominal pain, nausea or vomiting     Social   aMIOdarone    Tablet 100  atenolol  Tablet 12.5  heparin  Infusion 800  NIFEdipine XL 30  spironolactone 25      Allergies    Toprol-XL (Unknown)    Intolerances        Vital Signs Last 24 Hrs  T(C): 37.1 (20 Sep 2024 20:51), Max: 37.1 (20 Sep 2024 20:51)  T(F): 98.7 (20 Sep 2024 20:51), Max: 98.7 (20 Sep 2024 20:51)  HR: 60 (20 Sep 2024 23:30) (48 - 75)  BP: 160/70 (20 Sep 2024 23:30) (138/63 - 182/72)  BP(mean): 100 (20 Sep 2024 23:30) (90 - 115)  RR: 18 (20 Sep 2024 23:30) (18 - 20)  SpO2: 96% (20 Sep 2024 23:30) (93% - 98%)    Parameters below as of 20 Sep 2024 23:30  Patient On (Oxygen Delivery Method): room air      I&O's Summary    19 Sep 2024 07:01  -  20 Sep 2024 07:00  --------------------------------------------------------  IN: 572 mL / OUT: 400 mL / NET: 172 mL    20 Sep 2024 07:01  -  21 Sep 2024 03:24  --------------------------------------------------------  IN: 300 mL / OUT: 700 mL / NET: -400 mL        PHYSICAL EXAM:  General: NAD, pt resting comfortably in bed  Pulm: No respiratory distress, nonlabored breathing, on room air  CVS: NSR, HDS  Abd: Soft, NT, ND  Extremities: WWP, no edema  Pulses:       LABS:                        9.4    6.01  )-----------( 53       ( 20 Sep 2024 12:17 )             30.9     09-20    137  |  107  |  19  ----------------------------<  104[H]  4.3   |  24  |  1.33[H]    Ca    8.6      20 Sep 2024 05:30  Phos  2.8     09-20  Mg     1.8     09-20      PT/INR - ( 20 Sep 2024 12:17 )   PT: 21.8 sec;   INR: 1.95          PTT - ( 20 Sep 2024 12:17 )  PTT:61.2 sec    PLEASE CHECK WHEN PRESENT:     [  ]Heart Failure     [  ] Acute     [  ] Acute on Chronic     [  ] Chronic  -------------------------------------------------------------------     [x  ]Diastolic [HFpEF]     [  ]Systolic [HFrEF]     [  ]Combined [HFpEF & HFrEF]  .................................................................................     [  ]Other:     [ ] Pulmonary Hypertension     [ ] Chronic A-fib     [ ] Persistet A-fib     [ ] Permanent A-fib     [x ] Paroxysmal A-fib     [x] Hypertensive Heart Disease  -------------------------------------------------------------------  [ ] Respiratory failure  [ ] Acute cor pulmonale  [ ] Asthma/COPD Exacerbation  [ ]COPD on home O2 (Chronic renal Failure)   [ ] Pleural effusion  [ ] Aspiration pneumonia  [x ] Obstructive Sleep Apnea  [ ]Atelectasis   [ ] Acute PE   -------------------------------------------------------------------  [  ]Acute Kidney Injury      [  ]Acute Tubular Necrosis      [  ]Reneal Medullary Necrosis     [  ]Renal Cortical Necrosis     [  ]Other Pathological Lesions:    [  ]CKD 1  [  ]CKD 2  [  ]CKD 3  [  ]CKD 4  [  ]CKD 5 (ESRD)  [  ]Other  -------------------------------------------------------------------  [  ]Diabetes  [  ] Diabetic PVD Ulcer  [  ] Neuropathic ulcer to DM  [  ] Diabetes with Nephropathy  [  ] Osteomyelitis due to diabetes  [  ] Hyperglycemia   [  ]hypoglycemia   --------------------------------------------------------------------  [  ]Malnutrition: See Nutrition Note  [  ]Cachexia  [  ]Other:   [  ]Supplement Ordered:  [  ]Morbid Obesity (BMI >=40]  [ ] Ileus  ---------------------------------------------------------------------  [ ] Sepsis/severe sepsis/septic shock  [ ] Noninfectious SIRS  [ ] UTI  [ ] Pneumonia  [ ] Thrombophlebitis     -----------------------------------------------------------------------  [ ] Acidosis/alkalosis  [ ] Fluid overload  [ ] Hypokalemia  [ ] Hyperkalemia  [x ] Hypomagnesemia  [ ] Hypophosphatemia  [ ] Hyperphosphatemia  ------------------------------------------------------------------------  [ ] Acute blood loss anemia  [ ] Post op blood loss anemia  [ ] Iron deficiency anemia  [ ] Anemia due to chronic disease  [ ] Hypercoagulable state  [x ] Thrombocytopenia  ----------------------------------------------------------------------  [x ] Cerebral infarction  [ ] Transient ischemia attack  [ ] Encephalopathy - Toxic or Metabolic    A/P: 83yo M former smoker, with pmhx of HTN, HLD, COPD, ENEDINA (home bipap), AVR (mechanical - on Coumadin goal 2.5-3.5), HFpEF, paroxysmal Afib, PAD, ITP (followed by hematologist Dr. Aden Saeed, baseline platelets 70k), CKD  (baseline Cr 1.2-1.3), scoliosis, cardiac arrest in 1995 2/2 V Tach s/p ICD placed, cardiac arrest early 2000s received shocks from  ICD, b/l carotid stenosis and stroke (R subacute to chronic infarct in occipital lobe, and small infarcts, ?age in R thalamus, and R MCA/PCA watershed). Carotid duplex done in office with R ICA >80% stenosis with soft plaque, velocities 501/111 cm/s, L ICA with <50% stenosis, velocities up to 122/38 cm/s. Per daughter, 1 month ago patient began complaining of intermittent LUE numbness, and then began having LUE weakness along with LLE numbness and mild weakness 1 week later which prompted further work up. Patient then found to have stroke on CT. Patient planned for cardiac cath 9/20 and R CEA on Monday 9/23. Of note, last INR 2.9. Admitted to vascular surgery service.    Vascular/Carotid Stenosis  - Plan for R CEA Monday 9/23  - Plan for cardiac cath 9/20 for preop cardiac eval, added on with Dr. Guanako Thakkar  - Will d/w cards and heme re antiplatelet/anticoagulation plan after cardiac cath  - Platelets on hold for OR    AVR (mechanical valve)  - Outpatient INR goal 2.5-3.5  - INR on admission 2.74, s/p vitamin K 2.5 mg PO 9/19  - Holding home coumadin, last dose 9/17  - c/w hep gtt, monitor PTT, goal PTT 60-80    HFpEF  - 9/11/24: OG negative for thrombi, EF 50-55%  - c/w Spironolactone  - c/w atenolol  - Holding Lasix, Losartan periop    HTN/HLD  - c/w Atenolol  - c/w Atorvastatin  - holding Lasix, Losartan periop    COPD  - c/w Albuterol  - c/w Spiriva  - duoneb prior to procedure  - c/w home bipap   - follows with Pulmonologist, Dr. Wright  - CT Chest ([facility]) 03/16/23: Bilateral apical pleural parenchymal thickening. Mild paraseptal emphysema and centrilobular emphysema. There are mild paraseptal emphysema and centrilobular emphysema.   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 Saeed  - Per Dr. Saeed, cleared to proceed with CEA and continue current anticoagulation regimen so long as platelets >50k in the setting of suspected ITP    Anemia  MGUS  - Patient reports history of CARLEY, but on recent labwork obtained by Dr. Saeed, he is currently iron wnl and iron supplementation discontinued on 9/9.  - Outpatient labs show trend towards progressive anemia with low positive haptoglobin, Dr. Saeed planning possible outpatient bone marrow biopsy to evaluate for MM and other causes of anemia  - Will discuss with Dr. Saeed re thrombocytopenia and anemia in periop setting  - Will update iron studies and hemolysis labs    Diet: DASH  Activity: as stu  DVTppx: hep gtt  Dispo: pending  R CEA   O/N: Diagnostic cath with cardiology, no interventions, TR removed 1:30, heparin resumed at 3:30. pruritic rash per daughter, requesting ID/Derm eval, Benadryl given, Senna started.    Subjective: This am, pt endorses feeling well overall. He denies headache this AM. He endorses that his issues with balance, which have been present for two weeks, have improved overnight. Pt endorses a rash on his arms bilaterally, but claims that the lesions are not itching this AM as they have been on prior days.     ROS:   Denies Headache, blurred vision, Chest Pain, SOB, Abdominal pain, nausea or vomiting     Social   aMIOdarone    Tablet 100  atenolol  Tablet 12.5  heparin  Infusion 800  NIFEdipine XL 30  spironolactone 25      Allergies    Toprol-XL (Unknown)    Intolerances        Vital Signs Last 24 Hrs  T(C): 37.1 (20 Sep 2024 20:51), Max: 37.1 (20 Sep 2024 20:51)  T(F): 98.7 (20 Sep 2024 20:51), Max: 98.7 (20 Sep 2024 20:51)  HR: 60 (20 Sep 2024 23:30) (48 - 75)  BP: 160/70 (20 Sep 2024 23:30) (138/63 - 182/72)  BP(mean): 100 (20 Sep 2024 23:30) (90 - 115)  RR: 18 (20 Sep 2024 23:30) (18 - 20)  SpO2: 96% (20 Sep 2024 23:30) (93% - 98%)    Parameters below as of 20 Sep 2024 23:30  Patient On (Oxygen Delivery Method): room air      I&O's Summary    19 Sep 2024 07:01  -  20 Sep 2024 07:00  --------------------------------------------------------  IN: 572 mL / OUT: 400 mL / NET: 172 mL    20 Sep 2024 07:01  -  21 Sep 2024 03:24  --------------------------------------------------------  IN: 300 mL / OUT: 700 mL / NET: -400 mL        PHYSICAL EXAM:  General: NAD, pt resting comfortably in bed  Pulm: No respiratory distress, nonlabored breathing, on room air  CVS: NSR, HDS  Abd: Soft, NT, ND  Extremities: WWP, no edema; red execrations noted on upper back and bilateral upper extremities with no pus, or oozing appreciated         LABS:                        9.4    6.01  )-----------( 53       ( 20 Sep 2024 12:17 )             30.9     09-20    137  |  107  |  19  ----------------------------<  104[H]  4.3   |  24  |  1.33[H]    Ca    8.6      20 Sep 2024 05:30  Phos  2.8     09-20  Mg     1.8     09-20      PT/INR - ( 20 Sep 2024 12:17 )   PT: 21.8 sec;   INR: 1.95          PTT - ( 20 Sep 2024 12:17 )  PTT:61.2 sec    PLEASE CHECK WHEN PRESENT:     [  ]Heart Failure     [  ] Acute     [  ] Acute on Chronic     [  ] Chronic  -------------------------------------------------------------------     [x  ]Diastolic [HFpEF]     [  ]Systolic [HFrEF]     [  ]Combined [HFpEF & HFrEF]  .................................................................................     [  ]Other:     [ ] Pulmonary Hypertension     [ ] Chronic A-fib     [ ] Persistet A-fib     [ ] Permanent A-fib     [x ] Paroxysmal A-fib     [x] Hypertensive Heart Disease  -------------------------------------------------------------------  [ ] Respiratory failure  [ ] Acute cor pulmonale  [ ] Asthma/COPD Exacerbation  [ ]COPD on home O2 (Chronic renal Failure)   [ ] Pleural effusion  [ ] Aspiration pneumonia  [x ] Obstructive Sleep Apnea  [ ]Atelectasis   [ ] Acute PE   -------------------------------------------------------------------  [  ]Acute Kidney Injury      [  ]Acute Tubular Necrosis      [  ]Reneal Medullary Necrosis     [  ]Renal Cortical Necrosis     [  ]Other Pathological Lesions:    [  ]CKD 1  [  ]CKD 2  [  ]CKD 3  [  ]CKD 4  [  ]CKD 5 (ESRD)  [  ]Other  -------------------------------------------------------------------  [  ]Diabetes  [  ] Diabetic PVD Ulcer  [  ] Neuropathic ulcer to DM  [  ] Diabetes with Nephropathy  [  ] Osteomyelitis due to diabetes  [  ] Hyperglycemia   [  ]hypoglycemia   --------------------------------------------------------------------  [  ]Malnutrition: See Nutrition Note  [  ]Cachexia  [  ]Other:   [  ]Supplement Ordered:  [  ]Morbid Obesity (BMI >=40]  [ ] Ileus  ---------------------------------------------------------------------  [ ] Sepsis/severe sepsis/septic shock  [ ] Noninfectious SIRS  [ ] UTI  [ ] Pneumonia  [ ] Thrombophlebitis     -----------------------------------------------------------------------  [ ] Acidosis/alkalosis  [ ] Fluid overload  [ ] Hypokalemia  [ ] Hyperkalemia  [x ] Hypomagnesemia  [ ] Hypophosphatemia  [ ] Hyperphosphatemia  ------------------------------------------------------------------------  [ ] Acute blood loss anemia  [ ] Post op blood loss anemia  [ ] Iron deficiency anemia  [ ] Anemia due to chronic disease  [ ] Hypercoagulable state  [x ] Thrombocytopenia  ----------------------------------------------------------------------  [x ] Cerebral infarction  [ ] Transient ischemia attack  [ ] Encephalopathy - Toxic or Metabolic    A/P: 81yo M former smoker, with pmhx of HTN, HLD, COPD, ENEDINA (home bipap), AVR (mechanical - on Coumadin goal 2.5-3.5), HFpEF, paroxysmal Afib, PAD, ITP (followed by hematologist Dr. Aden Saeed, baseline platelets 70k), CKD  (baseline Cr 1.2-1.3), scoliosis, cardiac arrest in 1995 2/2 V Tach s/p ICD placed, cardiac arrest early 2000s received shocks from  ICD, b/l carotid stenosis and stroke (R subacute to chronic infarct in occipital lobe, and small infarcts, ?age in R thalamus, and R MCA/PCA watershed). Carotid duplex done in office with R ICA >80% stenosis with soft plaque, velocities 501/111 cm/s, L ICA with <50% stenosis, velocities up to 122/38 cm/s. Per daughter, 1 month ago patient began complaining of intermittent LUE numbness, and then began having LUE weakness along with LLE numbness and mild weakness 1 week later which prompted further work up. Patient then found to have stroke on CT. Patient planned for cardiac cath 9/20 and R CEA on Monday 9/23. Of note, last INR 2.9. Admitted to vascular surgery service.    Vascular/Carotid Stenosis  - Plan for R CEA Monday 9/23  - Plan for cardiac cath 9/20 for preop cardiac eval, added on with Dr. Guanako Thakkar  - Will d/w cards and heme re antiplatelet/anticoagulation plan after cardiac cath  - Platelets on hold for OR    AVR (mechanical valve)  - Outpatient INR goal 2.5-3.5  - INR on admission 2.74, s/p vitamin K 2.5 mg PO 9/19  - Holding home coumadin, last dose 9/17  - c/w hep gtt, monitor PTT, goal PTT 60-80    HFpEF  - 9/11/24: OG negative for thrombi, EF 50-55%  - c/w Spironolactone  - c/w atenolol  - Holding Lasix, Losartan periop    HTN/HLD  - c/w Atenolol  - c/w Atorvastatin  - holding Lasix, Losartan periop    COPD  - c/w Albuterol  - c/w Spiriva  - duoneb prior to procedure  - c/w home bipap   - follows with Pulmonologist, Dr. Wright  - CT Chest ([facility]) 03/16/23: Bilateral apical pleural parenchymal thickening. Mild paraseptal emphysema and centrilobular emphysema. There are mild paraseptal emphysema and centrilobular emphysema.   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 Saeed  - Per Dr. Saeed, cleared to proceed with CEA and continue current anticoagulation regimen so long as platelets >50k in the setting of suspected ITP    Anemia  MGUS  - Patient reports history of CARLEY, but on recent labwork obtained by Dr. Saeed, he is currently iron wnl and iron supplementation discontinued on 9/9.  - Outpatient labs show trend towards progressive anemia with low positive haptoglobin, Dr. Saeed planning possible outpatient bone marrow biopsy to evaluate for MM and other causes of anemia  - Will discuss with Dr. Saeed re thrombocytopenia and anemia in periop setting  - Will update iron studies and hemolysis labs    Diet: DASH  Activity: as stu  DVTppx: hep gtt  Dispo: pending  R CEA   O/N: Diagnostic cath with cardiology, no interventions, TR removed 1:30, heparin resumed at 3:30. pruritic rash per daughter, requesting ID/Derm eval, Benadryl given, Senna started.    Subjective: This am, pt endorses feeling well overall. He denies headache this AM. He endorses that his issues with balance, which have been present for two weeks, have improved overnight. Pt endorses a rash on his arms bilaterally, but claims that the lesions are not itching this AM as they have been on prior days.     ROS:   Denies Headache, blurred vision, Chest Pain, SOB, Abdominal pain, nausea or vomiting     Social   aMIOdarone    Tablet 100  atenolol  Tablet 12.5  heparin  Infusion 800  NIFEdipine XL 30  spironolactone 25      Allergies    Toprol-XL (Unknown)    Intolerances        Vital Signs Last 24 Hrs  T(C): 37.1 (20 Sep 2024 20:51), Max: 37.1 (20 Sep 2024 20:51)  T(F): 98.7 (20 Sep 2024 20:51), Max: 98.7 (20 Sep 2024 20:51)  HR: 60 (20 Sep 2024 23:30) (48 - 75)  BP: 160/70 (20 Sep 2024 23:30) (138/63 - 182/72)  BP(mean): 100 (20 Sep 2024 23:30) (90 - 115)  RR: 18 (20 Sep 2024 23:30) (18 - 20)  SpO2: 96% (20 Sep 2024 23:30) (93% - 98%)    Parameters below as of 20 Sep 2024 23:30  Patient On (Oxygen Delivery Method): room air      I&O's Summary    19 Sep 2024 07:01  -  20 Sep 2024 07:00  --------------------------------------------------------  IN: 572 mL / OUT: 400 mL / NET: 172 mL    20 Sep 2024 07:01  -  21 Sep 2024 03:24  --------------------------------------------------------  IN: 300 mL / OUT: 700 mL / NET: -400 mL        PHYSICAL EXAM:  General: NAD, pt resting comfortably in bed  Pulm: No respiratory distress, nonlabored breathing, on room air  CVS: NSR, HDS  Abd: Soft, NT, ND  Extremities: WWP, no edema; red execrations noted on upper back and bilateral upper extremities with no pus, or oozing appreciated         LABS:                        9.4    6.01  )-----------( 53       ( 20 Sep 2024 12:17 )             30.9     09-20    137  |  107  |  19  ----------------------------<  104[H]  4.3   |  24  |  1.33[H]    Ca    8.6      20 Sep 2024 05:30  Phos  2.8     09-20  Mg     1.8     09-20      PT/INR - ( 20 Sep 2024 12:17 )   PT: 21.8 sec;   INR: 1.95          PTT - ( 20 Sep 2024 12:17 )  PTT:61.2 sec    PLEASE CHECK WHEN PRESENT:     [  ]Heart Failure     [  ] Acute     [  ] Acute on Chronic     [  ] Chronic  -------------------------------------------------------------------     [x  ]Diastolic [HFpEF]     [  ]Systolic [HFrEF]     [  ]Combined [HFpEF & HFrEF]  .................................................................................     [  ]Other:     [ ] Pulmonary Hypertension     [ ] Chronic A-fib     [ ] Persistet A-fib     [ ] Permanent A-fib     [x ] Paroxysmal A-fib     [x] Hypertensive Heart Disease  -------------------------------------------------------------------  [ ] Respiratory failure  [ ] Acute cor pulmonale  [ ] Asthma/COPD Exacerbation  [ ]COPD on home O2 (Chronic renal Failure)   [ ] Pleural effusion  [ ] Aspiration pneumonia  [x ] Obstructive Sleep Apnea  [ ]Atelectasis   [ ] Acute PE   -------------------------------------------------------------------  [  ]Acute Kidney Injury      [  ]Acute Tubular Necrosis      [  ]Reneal Medullary Necrosis     [  ]Renal Cortical Necrosis     [  ]Other Pathological Lesions:    [  ]CKD 1  [  ]CKD 2  [  ]CKD 3  [  ]CKD 4  [  ]CKD 5 (ESRD)  [  ]Other  -------------------------------------------------------------------  [  ]Diabetes  [  ] Diabetic PVD Ulcer  [  ] Neuropathic ulcer to DM  [  ] Diabetes with Nephropathy  [  ] Osteomyelitis due to diabetes  [  ] Hyperglycemia   [  ]hypoglycemia   --------------------------------------------------------------------  [  ]Malnutrition: See Nutrition Note  [  ]Cachexia  [  ]Other:   [  ]Supplement Ordered:  [  ]Morbid Obesity (BMI >=40]  [ ] Ileus  ---------------------------------------------------------------------  [ ] Sepsis/severe sepsis/septic shock  [ ] Noninfectious SIRS  [ ] UTI  [ ] Pneumonia  [ ] Thrombophlebitis     -----------------------------------------------------------------------  [ ] Acidosis/alkalosis  [ ] Fluid overload  [ ] Hypokalemia  [ ] Hyperkalemia  [x ] Hypomagnesemia  [ ] Hypophosphatemia  [ ] Hyperphosphatemia  ------------------------------------------------------------------------  [ ] Acute blood loss anemia  [ ] Post op blood loss anemia  [ ] Iron deficiency anemia  [ ] Anemia due to chronic disease  [ ] Hypercoagulable state  [x ] Thrombocytopenia  ----------------------------------------------------------------------  [x ] Cerebral infarction  [ ] Transient ischemia attack  [ ] Encephalopathy - Toxic or Metabolic    A/P: 83yo M former smoker, with pmhx of HTN, HLD, COPD, ENEDINA (home bipap), AVR (mechanical - on Coumadin goal 2.5-3.5), HFpEF, paroxysmal Afib, PAD, ITP (followed by hematologist Dr. Aden Saeed, baseline platelets 70k), CKD  (baseline Cr 1.2-1.3), scoliosis, cardiac arrest in 1995 2/2 V Tach s/p ICD placed, cardiac arrest early 2000s received shocks from  ICD, b/l carotid stenosis and stroke (R subacute to chronic infarct in occipital lobe, and small infarcts, ?age in R thalamus, and R MCA/PCA watershed). Carotid duplex done in office with R ICA >80% stenosis with soft plaque, velocities 501/111 cm/s, L ICA with <50% stenosis, velocities up to 122/38 cm/s. Per daughter, 1 month ago patient began complaining of intermittent LUE numbness, and then began having LUE weakness along with LLE numbness and mild weakness 1 week later which prompted further work up. Patient then found to have stroke on CT. Patient planned for cardiac cath 9/20 and R CEA on Monday 9/23. Of note, last INR 2.9. Admitted to vascular surgery service.    Vascular/Carotid Stenosis  - Plan for R CEA Monday 9/23  - s/p LHC on 9/20 for preop cardiac eval, with Dr. Guanako Thakkar  - Will d/w cards and heme re antiplatelet/anticoagulation plan after cardiac cath  - 2U PRBC on hold for OR    AVR (mechanical valve)  - Outpatient INR goal 2.5-3.5  - INR on admission 2.74, s/p vitamin K 2.5 mg PO 9/19  - Holding home coumadin, last dose 9/17  - c/w hep gtt, monitor PTT, goal PTT 60-80    HFpEF  - 9/11/24: OG negative for thrombi, EF 50-55%  - c/w Spironolactone  - c/w atenolol  - Holding Lasix, Losartan periop    HTN/HLD  - c/w Atenolol  - c/w Atorvastatin  - holding Lasix, Losartan periop    COPD  - c/w Albuterol  - c/w Spiriva  - duoneb prior to procedure  - c/w home bipap   - follows with Pulmonologist, Dr. Wright  - CT Chest ([facility]) 03/16/23: Bilateral apical pleural parenchymal thickening. Mild paraseptal emphysema and centrilobular emphysema. There are mild paraseptal emphysema and centrilobular emphysema.   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 Saeed  - Per Dr. Saeed, cleared to proceed with CEA and continue current anticoagulation regimen so long as platelets >50k in the setting of suspected ITP    Anemia  MGUS  - Patient reports history of CARLEY, but on recent labwork obtained by Dr. Saeed, he is currently iron wnl and iron supplementation discontinued on 9/9.  - Outpatient labs show trend towards progressive anemia with low positive haptoglobin, Dr. Saeed planning possible outpatient bone marrow biopsy to evaluate for MM and other causes of anemia  - Will discuss with Dr. Saeed re thrombocytopenia and anemia in periop setting  - Will update iron studies and hemolysis labs  - Stool guiac     Diet: DASH  Activity: as stu  DVTppx: hep gtt  Dispo: pending  R CEA

## 2024-09-21 NOTE — PROGRESS NOTE ADULT - SUBJECTIVE AND OBJECTIVE BOX
Patient was seen and examined at bedside. Case discuss with the primary team.   Spoke to the patient with the assistance of the Polish  via the [employer] language line .Pt with itchy rash on back and lower extremity. Pt initial with mild headache this morning however when pt was reevaluate the headache had resolved.     OBJECTIVE:  Vital Signs Last 24 Hrs  T(C): 36.5 (21 Sep 2024 12:03), Max: 37.1 (20 Sep 2024 20:51)  T(F): 97.7 (21 Sep 2024 12:03), Max: 98.7 (20 Sep 2024 20:51)  HR: 52 (21 Sep 2024 16:23) (50 - 60)  BP: 151/65 (21 Sep 2024 16:23) (122/56 - 172/74)  BP(mean): 94 (21 Sep 2024 16:23) (81 - 109)  RR: 18 (21 Sep 2024 16:23) (16 - 18)  SpO2: 96% (21 Sep 2024 16:23) (93% - 96%)    Parameters below as of 21 Sep 2024 16:23  Patient On (Oxygen Delivery Method): room air      PHYSICAL EXAM:  -Gen: NAD, resting in bed  -HEENT: EOMI, PERRL, no scleral icterus  -CV: normal S1 and S2  -Lungs: CTABL, normal respiratory effort on BiPAP  -Ab: soft, NT, ND, normal BS  -Ext: no LE edema; rash on LLE   Back rash on back   -Neuro: A&O x 3, sensation intact and strength intact      LABS:                        8.6    4.87  )-----------( 56       ( 21 Sep 2024 05:30 )             28.3     09-21    141  |  109[H]  |  19  ----------------------------<  123[H]  4.3   |  23  |  1.30    Ca    8.6      21 Sep 2024 05:30  Phos  2.3     09-21  Mg     1.7     09-21        PT/INR - ( 21 Sep 2024 05:30 )   PT: 15.3 sec;   INR: 1.35          PTT - ( 21 Sep 2024 12:00 )  PTT:60.6 sec      acetaminophen     Tablet .. 650 milliGRAM(s) Oral every 6 hours PRN  albuterol/ipratropium for Nebulization 3 milliLiter(s) Nebulizer every 6 hours PRN  aMIOdarone    Tablet 100 milliGRAM(s) Oral daily  atenolol  Tablet 12.5 milliGRAM(s) Oral daily  atorvastatin 80 milliGRAM(s) Oral at bedtime  brimonidine 0.2% Ophthalmic Solution 1 Drop(s) Right EYE two times a day  dorzolamide 2% Ophthalmic Solution 1 Drop(s) Right EYE every 8 hours  heparin  Infusion 900 Unit(s)/Hr IV Continuous <Continuous>  hydrocortisone 1% Cream 1 Application(s) Topical at bedtime  immune   globulin 10% (GAMMAGARD) IVPB 30 Gram(s) IV Intermittent every 24 hours  latanoprost 0.005% Ophthalmic Solution 1 Drop(s) Both EYES at bedtime  NIFEdipine XL 30 milliGRAM(s) Oral daily  pantoprazole    Tablet 40 milliGRAM(s) Oral before breakfast  senna 1 Tablet(s) Oral at bedtime  sodium chloride 0.9%. 1000 milliLiter(s) IV Continuous <Continuous>  spironolactone 25 milliGRAM(s) Oral daily  tamsulosin 0.4 milliGRAM(s) Oral at bedtime  tiotropium 2.5 MICROgram(s)/olodaterol 2.5 MICROgram(s) (STIOLTO) Inhaler 2 Puff(s) Inhalation daily

## 2024-09-21 NOTE — DISCHARGE NOTE PROVIDER - CARE PROVIDER_API CALL
Shawn Kelley  Vascular Surgery  [address]  [place] [address]  Phone: [phone]  Fax: [phone]  Follow Up Time: 1 week

## 2024-09-21 NOTE — DISCHARGE NOTE PROVIDER - HOSPITAL COURSE
82 year old male, former smoker, with a past medical history of HTN, HLD, COPD, ENEDINA (on home bipap), AVR (mechanical, on Coumadin with goal 2.5-3.5), HFpEF, pAfib, PAD, and ITP (followed by hematologist Dr. Aden Saeed, baseline platelets 70k), CKD  (baseline Cr 1.2-1.3), scoliosis, cardiac arrest in 1995 2/2 V Tach s/p ICD placed, cardiac arrest early 2000s received shocks from  ICD, b/l carotid stenosis and stroke (R subacute to chronic infarct in occipital lobe, and small infarcts, ?age in R thalamus, and R MCA/PCA watershed). Carotid duplex done in office with R ICA >80% stenosis with soft plaque, velocities 501/111 cm/s, L ICA with <50% stenosis, velocities up to 122/38 cm/s. Per daughter, 1 month ago patient began complaining of intermittent LUE numbness, and then began having LUE weakness along with LLE numbness and mild weakness 1 week later which prompted further work up. Patient then found to have stroke on CT. Patient planned for cardiac cath 9/20 and R CEA on Monday 9/23. Of note, last INR 2.9. Admitted to vascular surgery service on 9/19/2024. Pm 9/20, pt underwent a left heart catheterization with Dr. Guanako Thakkar as part of pre-operative clearances (LPDA with 70-80% obstructive calcified stable disease, proximal LAD 30-40% calcified stable disease). Pt was then deemed stable to undergo the CEA procedure with vascular surgery. Per Pt's outpatient hematologist Dr. Saeed, pt was started on IVIG x5 days for ITP management.       *incomplete 9/21, EC*               82 year old male, former smoker, with a past medical history of HTN, HLD, COPD, ENEDINA (on home bipap), AVR (mechanical, on Coumadin with goal 2.5-3.5), HFpEF, pAfib, PAD, and ITP (followed by hematologist Dr. Aden Saeed, baseline platelets 70k), CKD  (baseline Cr 1.2-1.3), scoliosis, cardiac arrest in 1995 2/2 V Tach s/p ICD placed, cardiac arrest early 2000s received shocks from  ICD, b/l carotid stenosis and stroke (R subacute to chronic infarct in occipital lobe, and small infarcts, ?age in R thalamus, and R MCA/PCA watershed). Carotid duplex done in office with R ICA >80% stenosis with soft plaque, velocities 501/111 cm/s, L ICA with <50% stenosis, velocities up to 122/38 cm/s. Per daughter, 1 month ago patient began complaining of intermittent LUE numbness, and then began having LUE weakness along with LLE numbness and mild weakness 1 week later which prompted further work up. Patient then found to have stroke on CT. Patient planned for cardiac cath 9/20 and R CEA on Monday 9/23. Of note, last INR 2.9. Admitted to vascular surgery service on 9/19/2024. Pm 9/20, pt underwent a left heart catheterization with Dr. Guanako Thakkar as part of pre-operative clearances (LPDA with 70-80% obstructive calcified stable disease, proximal LAD 30-40% calcified stable disease). Pt was then deemed stable to undergo the CEA procedure with vascular surgery. Per Pt's outpatient hematologist Dr. Saeed, pt was started on IVIG x5 days for ITP management.       *incomplete 9/21               82 year old male, former smoker, with a past medical history of HTN, HLD, COPD, ENEDINA (on home bipap), AVR (mechanical, on Coumadin with goal 2.5-3.5), HFpEF, pAfib, PAD, and ITP (followed by hematologist Dr. Aden Saeed, baseline platelets 70k), CKD  (baseline Cr 1.2-1.3), scoliosis, cardiac arrest in 1995 2/2 V Tach s/p ICD placed, cardiac arrest early 2000s received shocks from  ICD, b/l carotid stenosis and stroke (R subacute to chronic infarct in occipital lobe, and small infarcts, ?age in R thalamus, and R MCA/PCA watershed). Carotid duplex done in office with R ICA >80% stenosis with soft plaque, velocities 501/111 cm/s, L ICA with <50% stenosis, velocities up to 122/38 cm/s. Per daughter, 1 month ago patient began complaining of intermittent LUE numbness, and then began having LUE weakness along with LLE numbness and mild weakness 1 week later which prompted further work up. Patient then found to have stroke on CT. Patient planned for cardiac cath 9/20 and R CEA on Monday 9/23. Of note, last INR 2.9. Admitted to vascular surgery service on 9/19/2024. Pm 9/20, pt underwent a left heart catheterization with Dr. Guanako Thakkar as part of pre-operative clearances (LPDA with 70-80% obstructive calcified stable disease, proximal LAD 30-40% calcified stable disease). Pt was then deemed stable to undergo the CEA procedure with vascular surgery. Per Pt's outpatient hematologist Dr. Saeed, pt was started on IVIG x5 days for ITP management. Due to patient's anemia and thrombocytopenia it was determined that patient will follow up with his hematologist during the week for repeat blood work and will be rescheduled for his carotid endarterectomy next week. Patent stable on discharge and d/c'ed home on therapeutic lovenox.               82 year old male, former smoker, with a past medical history of HTN, HLD, COPD, ENEDINA (on home bipap), AVR (mechanical, on Coumadin with goal 2.5-3.5), HFpEF, pAfib, PAD, and ITP (followed by hematologist Dr. Aden Saeed, baseline platelets 70k), CKD  (baseline Cr 1.2-1.3), scoliosis, cardiac arrest in 1995 2/2 V Tach s/p ICD placed, cardiac arrest early 2000s received shocks from  ICD, b/l carotid stenosis and stroke (R subacute to chronic infarct in occipital lobe, and small infarcts, ?age in R thalamus, and R MCA/PCA watershed). Carotid duplex done in office with R ICA >80% stenosis with soft plaque, velocities 501/111 cm/s, L ICA with <50% stenosis, velocities up to 122/38 cm/s. Per daughter, 1 month ago patient began complaining of intermittent LUE numbness, and then began having LUE weakness along with LLE numbness and mild weakness 1 week later which prompted further work up. Patient then found to have stroke on CT. Patient planned for cardiac cath 9/20 and R CEA on Monday 9/23. Of note, last INR 2.9. Admitted to vascular surgery service on 9/19/2024. Pm 9/20, pt underwent a left heart catheterization with Dr. Guanako Thakkar as part of pre-operative clearances (LPDA with 70-80% obstructive calcified stable disease, proximal LAD 30-40% calcified stable disease). Pt was then deemed stable to undergo the CEA procedure with vascular surgery. Per Pt's outpatient hematologist Dr. Saeed, pt was started on IVIG x5 days for ITP management. Due to patient's anemia and thrombocytopenia it was determined that patient will follow up with his hematologist during the week for repeat blood work and will be rescheduled for his carotid endarterectomy next week. Patent stable on discharge and d/c'ed home on therapeutic lovenox. Today patient is feeling well, all the VS and blood work is within normal limits, the pain is well controlled on oral pain medication, tolerating diet without nausea, and ambulating independently - patient is stable and ready for discharge today.

## 2024-09-21 NOTE — DISCHARGE NOTE PROVIDER - NSDCMRMEDTOKEN_GEN_ALL_CORE_FT
Albuterol (Eqv-ProAir HFA) 90 mcg/inh inhalation aerosol: 2 puff(s) inhaled every 6 hours as needed for  shortness of breath and/or wheezing  amiodarone 100 mg oral tablet: 1 tab(s) orally once a day  atenolol: 12.5 milligram(s) orally once a day tolerates atenolol  atorvastatin 80 mg oral tablet: 1 tab(s) orally once a day (at bedtime)  brimonidine 0.2% ophthalmic solution: 1 drop(s) in each affected eye 2 times a day right eye  Coumadin 5 mg oral tablet: 1 tab(s) orally once a day  dorzolamide 2% ophthalmic solution: 1 drop(s) in each eye 2 times a day right eye  Lasix 20 mg oral tablet: 1 tab(s) orally 2 times a week  losartan 50 mg oral tablet: 1 tab(s) orally once a day as needed for hypertension  meclizine 25 mg oral tablet: 1 tab(s) orally 3 times a day as needed for  dizziness  pantoprazole 40 mg oral delayed release tablet: 1 tab(s) orally once a day  Spiriva Respimat 10 ACT 2.5 mcg/inh inhalation aerosol: 2 puff(s) inhaled once a day  spironolactone 25 mg oral tablet: 1 tab(s) orally once a day  tamsulosin 0.4 mg oral capsule: 1 cap(s) orally once a day  travoprost 0.004% ophthalmic solution: 1 drop(s) in each affected eye once a day (at bedtime) both eyes   acetaminophen 325 mg oral tablet: 2 tab(s) orally every 6 hours As needed Mild Pain (1 - 3)  Albuterol (Eqv-ProAir HFA) 90 mcg/inh inhalation aerosol: 2 puff(s) inhaled every 6 hours as needed for  shortness of breath and/or wheezing  amiodarone 100 mg oral tablet: 1 tab(s) orally once a day  atenolol: 12.5 milligram(s) orally once a day tolerates atenolol  atorvastatin 80 mg oral tablet: 1 tab(s) orally once a day (at bedtime)  brimonidine 0.2% ophthalmic solution: 1 drop(s) in each affected eye 2 times a day right eye  dorzolamide 2% ophthalmic solution: 1 drop(s) in each eye 2 times a day right eye  Lasix 20 mg oral tablet: 1 tab(s) orally 2 times a week  losartan 50 mg oral tablet: 1 tab(s) orally once a day as needed for hypertension  meclizine 25 mg oral tablet: 1 tab(s) orally 3 times a day as needed for  dizziness  pantoprazole 40 mg oral delayed release tablet: 1 tab(s) orally once a day  Spiriva Respimat 10 ACT 2.5 mcg/inh inhalation aerosol: 2 puff(s) inhaled once a day  spironolactone 25 mg oral tablet: 1 tab(s) orally once a day  tamsulosin 0.4 mg oral capsule: 1 cap(s) orally once a day  travoprost 0.004% ophthalmic solution: 1 drop(s) in each affected eye once a day (at bedtime) both eyes   acetaminophen 325 mg oral tablet: 2 tab(s) orally every 6 hours As needed Mild Pain (1 - 3)  Albuterol (Eqv-ProAir HFA) 90 mcg/inh inhalation aerosol: 2 puff(s) inhaled every 6 hours as needed for  shortness of breath and/or wheezing  amiodarone 100 mg oral tablet: 1 tab(s) orally once a day  atenolol: 12.5 milligram(s) orally once a day tolerates atenolol  atorvastatin 80 mg oral tablet: 1 tab(s) orally once a day (at bedtime)  brimonidine 0.2% ophthalmic solution: 1 drop(s) in each affected eye 2 times a day right eye  dorzolamide 2% ophthalmic solution: 1 drop(s) in each eye 2 times a day right eye  Lasix 20 mg oral tablet: 1 tab(s) orally 2 times a week  losartan 50 mg oral tablet: 1 tab(s) orally once a day as needed for hypertension  Lovenox 80 mg/0.8 mL injectable solution: 80 milligram(s) subcutaneously every 12 hours  meclizine 25 mg oral tablet: 1 tab(s) orally 3 times a day as needed for  dizziness  pantoprazole 40 mg oral delayed release tablet: 1 tab(s) orally once a day  Spiriva Respimat 10 ACT 2.5 mcg/inh inhalation aerosol: 2 puff(s) inhaled once a day  spironolactone 25 mg oral tablet: 1 tab(s) orally once a day  tamsulosin 0.4 mg oral capsule: 1 cap(s) orally once a day  travoprost 0.004% ophthalmic solution: 1 drop(s) in each affected eye once a day (at bedtime) both eyes

## 2024-09-21 NOTE — DISCHARGE NOTE PROVIDER - NSDCFUADDINST_GEN_ALL_CORE_FT
FOLLOW UP: Dr. Kelley in 1 week. Your appointment has been made for _______. Call the office at  with any questions.    WOUND CARE: You may shower; soap and water over incision sites. Do not scrub. Pat dry when done.     ACTIVITY: Ambulate as tolerated, but no heavy lifting (>10lbs) or strenuous exercise. NO sharp neck turns. NO driving until you see surgeon in office. If you have a persistent headache that is not improved with Tylenol, please call MD.    Call the office if you experience increasing pain, redness, swelling or drainage from incision sites/wounds, or temperature >101.4F.     NEW MEDICATIONS:    ADDITIONAL FOLLOW UP AFTER DISCHARGE:  Follow up with your PCP and pulmonologist per routine.  Follow up with your hematologist within 2 weeks of discharge.    DISCHARGE DESTINATION:     DISCHARGE EDUCATION:    STROKE EDUCATION: Call 911 if you or someone you know experiences the following symptoms of stroke (can be remembered by BE FAST):  •Balance: Dizziness, loss of balance, or a sense of falling  •Eyes: Sudden double vision or blurred vision  •Face: drooping of one side of the face  •Arm: arm weakness  •Speech:  Sudden trouble talking or slurred speech, trouble understanding others  •Time: Time to call for an ambulance fast!   FOLLOW UP: Dr. Kelley next Monday at  for carotid endarterectomy 9/30/24. Call the office at  with any questions.    ACTIVITY: Ambulate as tolerated, but no heavy lifting (>10lbs) or strenuous exercise.     NEW MEDICATIONS: Lovenox. Please  the new medication at [facility] located in the lobby of the hospital.    ADDITIONAL FOLLOW UP AFTER DISCHARGE: Please follow up with your Hematologist this week.    DISCHARGE DESTINATION: Home    DISCHARGE EDUCATION: Yes    STROKE EDUCATION: Call 911 if you or someone you know experiences the following symptoms of stroke (can be remembered by BE FAST):  •Balance: Dizziness, loss of balance, or a sense of falling  •Eyes: Sudden double vision or blurred vision  •Face: drooping of one side of the face  •Arm: arm weakness  •Speech:  Sudden trouble talking or slurred speech, trouble understanding others  •Time: Time to call for an ambulance fast!

## 2024-09-21 NOTE — DISCHARGE NOTE PROVIDER - NSDCFUSCHEDAPPT_GEN_ALL_CORE_FT
[facility]  HEARTVASC [address]  Scheduled Appointment: 09/26/2024    Alexander Ayala  [facility]  NEUROSURG [address]  Scheduled Appointment: 09/26/2024    Ori Brown  [facility]  HEARTVASC [address]  Scheduled Appointment: 10/15/2024    Gagandeep Olivas  [facility]  HEARTVASC [address]  Scheduled Appointment: 11/07/2024    Adelaide Garcia  [facility]  NEUROLOGY [address]  Scheduled Appointment: 11/19/2024    Quyen Wright  [facility]  PULMMED [address]  Scheduled Appointment: 12/04/2024

## 2024-09-22 LAB
ACANTHOCYTES BLD QL SMEAR: SIGNIFICANT CHANGE UP
ANION GAP SERPL CALC-SCNC: 8 MMOL/L — SIGNIFICANT CHANGE UP (ref 5–17)
ANISOCYTOSIS BLD QL: SIGNIFICANT CHANGE UP
APTT BLD: 56.2 SEC — HIGH (ref 24.5–35.6)
APTT BLD: 78.4 SEC — HIGH (ref 24.5–35.6)
BLD GP AB SCN SERPL QL: NEGATIVE — SIGNIFICANT CHANGE UP
BUN SERPL-MCNC: 20 MG/DL — SIGNIFICANT CHANGE UP (ref 7–23)
BURR CELLS BLD QL SMEAR: PRESENT — SIGNIFICANT CHANGE UP
CALCIUM SERPL-MCNC: 9 MG/DL — SIGNIFICANT CHANGE UP (ref 8.4–10.5)
CHLORIDE SERPL-SCNC: 104 MMOL/L — SIGNIFICANT CHANGE UP (ref 96–108)
CO2 SERPL-SCNC: 25 MMOL/L — SIGNIFICANT CHANGE UP (ref 22–31)
CREAT SERPL-MCNC: 1.46 MG/DL — HIGH (ref 0.5–1.3)
EGFR: 47 ML/MIN/1.73M2 — LOW
GIANT PLATELETS BLD QL SMEAR: PRESENT — SIGNIFICANT CHANGE UP
GLUCOSE SERPL-MCNC: 103 MG/DL — HIGH (ref 70–99)
HCT VFR BLD CALC: 27.9 % — LOW (ref 39–50)
HGB BLD-MCNC: 8.5 G/DL — LOW (ref 13–17)
HYPOCHROMIA BLD QL: SIGNIFICANT CHANGE UP
IRON SATN MFR SERPL: 140 UG/DL — SIGNIFICANT CHANGE UP (ref 45–165)
IRON SATN MFR SERPL: 53 % — SIGNIFICANT CHANGE UP (ref 16–55)
MACROCYTES BLD QL: SLIGHT — SIGNIFICANT CHANGE UP
MAGNESIUM SERPL-MCNC: 1.7 MG/DL — SIGNIFICANT CHANGE UP (ref 1.6–2.6)
MANUAL SMEAR VERIFICATION: SIGNIFICANT CHANGE UP
MCHC RBC-ENTMCNC: 25.6 PG — LOW (ref 27–34)
MCHC RBC-ENTMCNC: 30.5 GM/DL — LOW (ref 32–36)
MCV RBC AUTO: 84 FL — SIGNIFICANT CHANGE UP (ref 80–100)
METAMYELOCYTES # FLD: 0.9 % — HIGH (ref 0–0)
MICROCYTES BLD QL: SLIGHT — SIGNIFICANT CHANGE UP
MYELOCYTES NFR BLD: 1.8 % — HIGH (ref 0–0)
NEUTS BAND # BLD: 1.8 % — SIGNIFICANT CHANGE UP (ref 0–8)
NRBC # BLD: 0 /100 WBCS — SIGNIFICANT CHANGE UP (ref 0–0)
NRBC # BLD: 1 /100 WBCS — HIGH (ref 0–0)
OVALOCYTES BLD QL SMEAR: SLIGHT — SIGNIFICANT CHANGE UP
PHOSPHATE SERPL-MCNC: 2.7 MG/DL — SIGNIFICANT CHANGE UP (ref 2.5–4.5)
PLAT MORPH BLD: ABNORMAL
PLATELET # BLD AUTO: 62 K/UL — LOW (ref 150–400)
POIKILOCYTOSIS BLD QL AUTO: SIGNIFICANT CHANGE UP
POLYCHROMASIA BLD QL SMEAR: SLIGHT — SIGNIFICANT CHANGE UP
POTASSIUM SERPL-MCNC: 4.3 MMOL/L — SIGNIFICANT CHANGE UP (ref 3.5–5.3)
POTASSIUM SERPL-SCNC: 4.3 MMOL/L — SIGNIFICANT CHANGE UP (ref 3.5–5.3)
RBC # BLD: 3.32 M/UL — LOW (ref 4.2–5.8)
RBC # FLD: 24.4 % — HIGH (ref 10.3–14.5)
RBC BLD AUTO: ABNORMAL
RH IG SCN BLD-IMP: POSITIVE — SIGNIFICANT CHANGE UP
SCHISTOCYTES BLD QL AUTO: SIGNIFICANT CHANGE UP
SODIUM SERPL-SCNC: 137 MMOL/L — SIGNIFICANT CHANGE UP (ref 135–145)
SPHEROCYTES BLD QL SMEAR: SIGNIFICANT CHANGE UP
TIBC SERPL-MCNC: 262 UG/DL — SIGNIFICANT CHANGE UP (ref 220–430)
UIBC SERPL-MCNC: 116 UG/DL — SIGNIFICANT CHANGE UP (ref 110–370)
WBC # BLD: 4.63 K/UL — SIGNIFICANT CHANGE UP (ref 3.8–10.5)
WBC # FLD AUTO: 4.63 K/UL — SIGNIFICANT CHANGE UP (ref 3.8–10.5)

## 2024-09-22 PROCEDURE — 99232 SBSQ HOSP IP/OBS MODERATE 35: CPT | Mod: GC

## 2024-09-22 PROCEDURE — 71046 X-RAY EXAM CHEST 2 VIEWS: CPT | Mod: 26

## 2024-09-22 PROCEDURE — 99233 SBSQ HOSP IP/OBS HIGH 50: CPT

## 2024-09-22 RX ORDER — SODIUM PHOSPHATE IN D5W 15MMOL/250
15 PLASTIC BAG, INJECTION (ML) INTRAVENOUS ONCE
Refills: 0 | Status: COMPLETED | OUTPATIENT
Start: 2024-09-22 | End: 2024-09-22

## 2024-09-22 RX ORDER — FUROSEMIDE 10 MG/ML
20 INJECTION INTRAVENOUS ONCE
Refills: 0 | Status: COMPLETED | OUTPATIENT
Start: 2024-09-22 | End: 2024-09-22

## 2024-09-22 RX ORDER — MAGNESIUM SULFATE 500 MG/ML
1 VIAL (ML) INJECTION ONCE
Refills: 0 | Status: COMPLETED | OUTPATIENT
Start: 2024-09-22 | End: 2024-09-22

## 2024-09-22 RX ORDER — DIPHENHYDRAMINE HCL 12.5MG/5ML
25 LIQUID (ML) ORAL DAILY
Refills: 0 | Status: DISCONTINUED | OUTPATIENT
Start: 2024-09-22 | End: 2024-09-23

## 2024-09-22 RX ORDER — ACETAMINOPHEN 325 MG
650 TABLET ORAL ONCE
Refills: 0 | Status: COMPLETED | OUTPATIENT
Start: 2024-09-22 | End: 2024-09-22

## 2024-09-22 RX ADMIN — ATENOLOL 12.5 MILLIGRAM(S): 25 TABLET ORAL at 05:44

## 2024-09-22 RX ADMIN — Medication 9 UNIT(S)/HR: at 19:56

## 2024-09-22 RX ADMIN — Medication 9 UNIT(S)/HR: at 05:43

## 2024-09-22 RX ADMIN — PANTOPRAZOLE SODIUM 40 MILLIGRAM(S): 40 TABLET, DELAYED RELEASE ORAL at 05:43

## 2024-09-22 RX ADMIN — Medication 30 MILLIGRAM(S): at 05:43

## 2024-09-22 RX ADMIN — IPRATROPIUM BROMIDE AND ALBUTEROL SULFATE 3 MILLILITER(S): .5; 3 SOLUTION RESPIRATORY (INHALATION) at 10:47

## 2024-09-22 RX ADMIN — Medication 1 TABLET(S): at 21:28

## 2024-09-22 RX ADMIN — Medication 1 DROP(S): at 14:43

## 2024-09-22 RX ADMIN — FUROSEMIDE 20 MILLIGRAM(S): 10 INJECTION INTRAVENOUS at 18:20

## 2024-09-22 RX ADMIN — AMIODARONE HYDROCHLORIDE 100 MILLIGRAM(S): 50 INJECTION, SOLUTION INTRAVENOUS at 10:25

## 2024-09-22 RX ADMIN — IMMUNE GLOBULIN (HUMAN) 75 GRAM(S): 10 INJECTION INTRAVENOUS; SUBCUTANEOUS at 11:09

## 2024-09-22 RX ADMIN — SPIRONOLACTONE 25 MILLIGRAM(S): 100 TABLET, FILM COATED ORAL at 12:04

## 2024-09-22 RX ADMIN — ATORVASTATIN CALCIUM 80 MILLIGRAM(S): 10 TABLET, FILM COATED ORAL at 21:27

## 2024-09-22 RX ADMIN — Medication 650 MILLIGRAM(S): at 10:46

## 2024-09-22 RX ADMIN — ANTI-ITCH CREAM 1 APPLICATION(S): 1 OINTMENT TOPICAL at 21:29

## 2024-09-22 RX ADMIN — LATANOPROST 1 DROP(S): 50 SOLUTION OPHTHALMIC at 21:28

## 2024-09-22 RX ADMIN — Medication 25 MILLIGRAM(S): at 10:46

## 2024-09-22 RX ADMIN — Medication 1 DROP(S): at 21:28

## 2024-09-22 RX ADMIN — Medication 1 DROP(S): at 05:44

## 2024-09-22 RX ADMIN — BRIMONIDINE TARTRATE 1 DROP(S): 1 SOLUTION/ DROPS OPHTHALMIC at 05:44

## 2024-09-22 RX ADMIN — BRIMONIDINE TARTRATE 1 DROP(S): 1 SOLUTION/ DROPS OPHTHALMIC at 18:47

## 2024-09-22 RX ADMIN — Medication 9 UNIT(S)/HR: at 08:21

## 2024-09-22 RX ADMIN — Medication 0.4 MILLIGRAM(S): at 21:28

## 2024-09-22 RX ADMIN — Medication 100 GRAM(S): at 14:44

## 2024-09-22 RX ADMIN — Medication 62.5 MILLIMOLE(S): at 19:01

## 2024-09-22 RX ADMIN — TIOTROPIUM BROMIDE AND OLODATEROL 2 PUFF(S): 3.124; 2.736 SPRAY, METERED RESPIRATORY (INHALATION) at 13:07

## 2024-09-22 NOTE — CONSULT NOTE ADULT - ASSESSMENT
82M h/o CVA (~1 mo ago with carotid stenosis), mechanical AVR (on warfarin), afib, VT c/b cardiac arrest s/p ICD, ITP, anemia, remote PUD admitted for cardiac cath and CEA.    Pt with Hgb drop from 12.5 (4/2024) -> 11.0 (8/2024) -> 9.8 this admission without significant overt GI bleeding and no clear evidence of iron deficiency. While it is possible that he could have small amount of GI bleed that has not yet depleted iron stores, priority currently would be for pt to undergo CEA and would consider evaluation for other causes of anemia as well -- of note does have mildly elevated LDH and low haptoglobin without evidence of hyperbilirubinemia. At some point can consider endoscopic evaluation given prior BRBPR however f/u clinical status for now.    Recommendations:  - Will f/u clinical course after CEA  - Consider hematology eval for other sources of anemia    We will continue to follow. Please see attending addendum for final recommendations.     Malachi (Brigitte) MD Fazal  Gastroenterology Fellow  GI consult pager (M-F 3r-5v): [phone]  Please call  for on-call fellow after hours

## 2024-09-22 NOTE — PROGRESS NOTE ADULT - SUBJECTIVE AND OBJECTIVE BOX
Patient was seen and examined at bedside. Case discuss with the primary team. Pt w/o any events overnight. pt denied pain or itching from rash.     OBJECTIVE:  Vital Signs Last 24 Hrs  T(C): 36.6 (22 Sep 2024 09:35), Max: 36.6 (21 Sep 2024 21:58)  T(F): 97.8 (22 Sep 2024 09:35), Max: 97.8 (21 Sep 2024 21:58)  HR: 60 (22 Sep 2024 09:35) (52 - 60)  BP: 153/70 (22 Sep 2024 09:35) (148/65 - 153/70)  BP(mean): 94 (22 Sep 2024 05:15) (94 - 100)  RR: 16 (22 Sep 2024 09:35) (16 - 18)  SpO2: 96% (22 Sep 2024 09:35) (96% - 99%)    Parameters below as of 22 Sep 2024 09:35  Patient On (Oxygen Delivery Method): room air    PHYSICAL EXAM:  Gen: NAD laying in bed  CV: RRR, +S1/S2, no mumur  Pulm: CTA b/l no wheezing or crackles   Abd: soft, NTND + BS no rebound or guarding   Neuro: AAOX3; nonfocal       LABS:                        8.5    4.63  )-----------( 62       ( 22 Sep 2024 05:30 )             27.9     09-22    137  |  104  |  20  ----------------------------<  103[H]  4.3   |  25  |  1.46[H]    Ca    9.0      22 Sep 2024 05:30  Phos  2.7     09-22  Mg     1.7     09-22    TPro  6.1  /  Alb  3.3  /  TBili  0.8  /  DBili  0.2  /  AST  23  /  ALT  19  /  AlkPhos  74  09-21    LIVER FUNCTIONS - ( 21 Sep 2024 05:30 )  Alb: 3.3 g/dL / Pro: 6.1 g/dL / ALK PHOS: 74 U/L / ALT: 19 U/L / AST: 23 U/L / GGT: x         PT: 15.3 sec;   INR: 1.35     PTT:78.4 sec  Transferrin 199  Ferritin 271  Haptoglobin <10         acetaminophen     Tablet .. 650 milliGRAM(s) Oral every 6 hours PRN  albuterol/ipratropium for Nebulization 3 milliLiter(s) Nebulizer every 6 hours PRN  aMIOdarone    Tablet 100 milliGRAM(s) Oral daily  atenolol  Tablet 12.5 milliGRAM(s) Oral daily  atorvastatin 80 milliGRAM(s) Oral at bedtime  brimonidine 0.2% Ophthalmic Solution 1 Drop(s) Right EYE two times a day  diphenhydrAMINE 25 milliGRAM(s) Oral daily  dorzolamide 2% Ophthalmic Solution 1 Drop(s) Right EYE every 8 hours  heparin  Infusion 900 Unit(s)/Hr IV Continuous <Continuous>  hydrocortisone 1% Cream 1 Application(s) Topical at bedtime  immune   globulin 10% (GAMMAGARD) IVPB 30 Gram(s) IV Intermittent every 24 hours  latanoprost 0.005% Ophthalmic Solution 1 Drop(s) Both EYES at bedtime  NIFEdipine XL 30 milliGRAM(s) Oral daily  pantoprazole    Tablet 40 milliGRAM(s) Oral before breakfast  polyethylene glycol 3350 17 Gram(s) Oral daily  senna 1 Tablet(s) Oral at bedtime  sodium phosphate 15 milliMole(s)/250 mL IVPB 15 milliMole(s) IV Intermittent once  spironolactone 25 milliGRAM(s) Oral daily  tamsulosin 0.4 milliGRAM(s) Oral at bedtime  tiotropium 2.5 MICROgram(s)/olodaterol 2.5 MICROgram(s) (STIOLTO) Inhaler 2 Puff(s) Inhalation daily

## 2024-09-22 NOTE — PROGRESS NOTE ADULT - ASSESSMENT
82-year-old male with a PMHx of HTN, HLD, ENEDINA, COPD, AVR (on Warfarin), HFpEF, pAfib, PAD, ITP, CKD (baseline Cr 1.2 - 1.3), VTach (s/p ICD), CVA and FRANCY who presented for right CEA.      #Carotid Artery Stenosis    #PAD   -Continue  management as per vascular surgery, Pt s/p  LHC (9/20)   -Pt is for right CEA (9/23)   -Continue management of anti-platelets and anti-coagulation as per primary team, cardiology and hematology    -Pt on heparin gtt while holding warfarin; Will continue to monitor PTT    -Continue Atorvastatin 80mg qhs     #ITP   #MGUS   #Anemia   -Continue management by outpatient hematologist Dr. Saeed  -Spoke to pt's outpt GI doctor Dr. Velasquez and pt's daughter regarding pt's anemia on 9/21. Per discussion the pt's Hgb has been downtrending since mid July.    -Will continue to monitor serial CBC    #pAfib   -Pt on heparin gtt while warfarin is held   -continue with Atenolol 12.5mg daily     #HTN   -continue with Spironolactone 25mg daily and Atenolol 12.5mg daily   -Lasix and Losartan held in terri-operative period    #COPD   -continue with Stiolto and PRN duonebs     #ENEDINA   -continue with nocturnal BiPAP (settings 18/14 as per outpatient pulmonology note)     #CKD (baseline Cr 1.2 - 1.3)   -Pt slight increase in creatinine 1.30-1.46   -Will continue IVF and continue to monitor creatinine     #Rash   -Pt with rash on back and LLE   - Will continue  topical hydrocortisone cream   -Derm consult in the AM if rash worsens     #BPH   -continue with Tamsulosin 0.4mg qhs     #History of VTach (s/p ICD)  -continue with Amiodarone 100mg daily     #DVT PPx  -Continue  heparin gtt    #Dispo:   -Pt is for right CEA on 9/23    50 minutes spent on total encounter. The necessity of the time spent during the encounter on this date of service was due to:    Review of hospital course, labs, vitals, medical records.  Bedside exam   Discussed plan of care with primary team as well as with pt's daughter and outpt GI Dr. Velasquez  Documenting the encounter.

## 2024-09-22 NOTE — CONSULT NOTE ADULT - SUBJECTIVE AND OBJECTIVE BOX
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      Follows with Dr. Aden Saeed for ITP and anemia     Was on iron    4/12/24: Hgb 12.5, ferritin 60, Tasat 33%  8/22/24: Hgb 11, ferritin 145, Tsat 45%    Allergies    Toprol-XL (Unknown)    Intolerances      Home Medications:  Albuterol (Eqv-ProAir HFA) 90 mcg/inh inhalation aerosol: 2 puff(s) inhaled every 6 hours as needed for  shortness of breath and/or wheezing (19 Sep 2024 22:13)  amiodarone 100 mg oral tablet: 1 tab(s) orally once a day (19 Sep 2024 22:14)  atenolol: 12.5 milligram(s) orally once a day tolerates atenolol (19 Sep 2024 22:15)  atorvastatin 80 mg oral tablet: 1 tab(s) orally once a day (at bedtime) (19 Sep 2024 22:15)  brimonidine 0.2% ophthalmic solution: 1 drop(s) in each affected eye 2 times a day right eye (19 Sep 2024 22:16)  Coumadin 5 mg oral tablet: 1 tab(s) orally once a day (19 Sep 2024 22:23)  dorzolamide 2% ophthalmic solution: 1 drop(s) in each eye 2 times a day right eye (19 Sep 2024 22:16)  Lasix 20 mg oral tablet: 1 tab(s) orally 2 times a week (19 Sep 2024 22:17)  losartan 50 mg oral tablet: 1 tab(s) orally once a day as needed for hypertension (19 Sep 2024 22:18)  meclizine 25 mg oral tablet: 1 tab(s) orally 3 times a day as needed for  dizziness (19 Sep 2024 22:20)  pantoprazole 40 mg oral delayed release tablet: 1 tab(s) orally once a day (19 Sep 2024 22:21)  Spiriva Respimat 10 ACT 2.5 mcg/inh inhalation aerosol: 2 puff(s) inhaled once a day (19 Sep 2024 22:22)  spironolactone 25 mg oral tablet: 1 tab(s) orally once a day (19 Sep 2024 22:21)  tamsulosin 0.4 mg oral capsule: 1 cap(s) orally once a day (19 Sep 2024 22:21)  travoprost 0.004% ophthalmic solution: 1 drop(s) in each affected eye once a day (at bedtime) both eyes (19 Sep 2024 22:23)    MEDICATIONS:  MEDICATIONS  (STANDING):  aMIOdarone    Tablet 100 milliGRAM(s) Oral daily  atenolol  Tablet 12.5 milliGRAM(s) Oral daily  atorvastatin 80 milliGRAM(s) Oral at bedtime  brimonidine 0.2% Ophthalmic Solution 1 Drop(s) Right EYE two times a day  diphenhydrAMINE 25 milliGRAM(s) Oral daily  dorzolamide 2% Ophthalmic Solution 1 Drop(s) Right EYE every 8 hours  heparin  Infusion 900 Unit(s)/Hr (9 mL/Hr) IV Continuous <Continuous>  hydrocortisone 1% Cream 1 Application(s) Topical at bedtime  immune   globulin 10% (GAMMAGARD) IVPB 30 Gram(s) IV Intermittent every 24 hours  latanoprost 0.005% Ophthalmic Solution 1 Drop(s) Both EYES at bedtime  NIFEdipine XL 30 milliGRAM(s) Oral daily  pantoprazole    Tablet 40 milliGRAM(s) Oral before breakfast  polyethylene glycol 3350 17 Gram(s) Oral daily  senna 1 Tablet(s) Oral at bedtime  sodium phosphate 15 milliMole(s)/250 mL IVPB 15 milliMole(s) IV Intermittent once  spironolactone 25 milliGRAM(s) Oral daily  tamsulosin 0.4 milliGRAM(s) Oral at bedtime  tiotropium 2.5 MICROgram(s)/olodaterol 2.5 MICROgram(s) (STIOLTO) Inhaler 2 Puff(s) Inhalation daily    MEDICATIONS  (PRN):  acetaminophen     Tablet .. 650 milliGRAM(s) Oral every 6 hours PRN Mild Pain (1 - 3)  albuterol/ipratropium for Nebulization 3 milliLiter(s) Nebulizer every 6 hours PRN Shortness of Breath and/or Wheezing    PAST MEDICAL & SURGICAL HISTORY:      Vital Signs Last 24 Hrs  T(C): 36.6 (22 Sep 2024 09:35), Max: 36.6 (21 Sep 2024 16:23)  T(F): 97.8 (22 Sep 2024 09:35), Max: 97.9 (21 Sep 2024 16:23)  HR: 60 (22 Sep 2024 09:35) (52 - 60)  BP: 153/70 (22 Sep 2024 09:35) (148/65 - 153/70)  BP(mean): 94 (22 Sep 2024 05:15) (94 - 100)  RR: 16 (22 Sep 2024 09:35) (16 - 18)  SpO2: 96% (22 Sep 2024 09:35) (96% - 99%)    Parameters below as of 22 Sep 2024 09:35  Patient On (Oxygen Delivery Method): room air        09-21 @ 07:01  -  09-22 @ 07:00  --------------------------------------------------------  IN: 1138 mL / OUT: 1775 mL / NET: -637 mL    09-22 @ 07:01  -  09-22 @ 16:18  --------------------------------------------------------  IN: 843 mL / OUT: 500 mL / NET: 343 mL        PHYSICAL EXAM:  General: Alert, no acute distress  Lungs: Normal respiratory effort  Abdomen: Soft, nondistended, nontender  Extremities: *** edema  Neurological: Moving all extremities spontaneously***    LABS:                        8.5    4.63  )-----------( 62       ( 22 Sep 2024 05:30 )             27.9     09-22    137  |  104  |  20  ----------------------------<  103[H]  4.3   |  25  |  1.46[H]    Ca    9.0      22 Sep 2024 05:30  Phos  2.7     09-22  Mg     1.7     09-22    TPro  6.1  /  Alb  3.3  /  TBili  0.8  /  DBili  0.2  /  AST  23  /  ALT  19  /  AlkPhos  74  09-21    PT/INR - ( 21 Sep 2024 05:30 )   PT: 15.3 sec;   INR: 1.35          PTT - ( 22 Sep 2024 05:30 )  PTT:78.4 sec    RADIOLOGY & ADDITIONAL STUDIES:  Reviewed. HPI:  82M h/o CVA (~1 mo ago with carotid stenosis), mechanical AVR (on warfarin), afib, VT c/b cardiac arrest s/p ICD, ITP, anemia, remote PUD admitted for cardiac cath and CEA.    Pt had been undergoing outpatient evaluation for CVA and found with severe carotid stenosis thus admitted for cardiac cath and CEA. Has also developed anemia with Hgb 12.5, ferritin 60, Tsat 33% in 4/2024 and Hgb 11, ferritin 145, Tsat 45% in 8/2024. Per daughter ferritin was perhaps ~60 at lowest and she put pt on PO iron supplements for a period of time empirically. She reports that he had darker stools on PO iron but no overt melena and 1 episode of minimal BRBPR iso constipation and hemorrhoids. Had a peptic ulcer ~40 yrs ago and has been on PPI since then. Last EGD and colonoscopy was ~10 yrs ago and reportedly EGD unremarkable and 2 small polyps on colonoscopy. Per daughter CT A/P with PO contrast was unremarkable for source of bleed. Follows with Dr. Aden Saeed for ITP.    Allergies  Toprol-XL (Unknown)    Intolerances      Home Medications:  Albuterol (Eqv-ProAir HFA) 90 mcg/inh inhalation aerosol: 2 puff(s) inhaled every 6 hours as needed for  shortness of breath and/or wheezing (19 Sep 2024 22:13)  amiodarone 100 mg oral tablet: 1 tab(s) orally once a day (19 Sep 2024 22:14)  atenolol: 12.5 milligram(s) orally once a day tolerates atenolol (19 Sep 2024 22:15)  atorvastatin 80 mg oral tablet: 1 tab(s) orally once a day (at bedtime) (19 Sep 2024 22:15)  brimonidine 0.2% ophthalmic solution: 1 drop(s) in each affected eye 2 times a day right eye (19 Sep 2024 22:16)  Coumadin 5 mg oral tablet: 1 tab(s) orally once a day (19 Sep 2024 22:23)  dorzolamide 2% ophthalmic solution: 1 drop(s) in each eye 2 times a day right eye (19 Sep 2024 22:16)  Lasix 20 mg oral tablet: 1 tab(s) orally 2 times a week (19 Sep 2024 22:17)  losartan 50 mg oral tablet: 1 tab(s) orally once a day as needed for hypertension (19 Sep 2024 22:18)  meclizine 25 mg oral tablet: 1 tab(s) orally 3 times a day as needed for  dizziness (19 Sep 2024 22:20)  pantoprazole 40 mg oral delayed release tablet: 1 tab(s) orally once a day (19 Sep 2024 22:21)  Spiriva Respimat 10 ACT 2.5 mcg/inh inhalation aerosol: 2 puff(s) inhaled once a day (19 Sep 2024 22:22)  spironolactone 25 mg oral tablet: 1 tab(s) orally once a day (19 Sep 2024 22:21)  tamsulosin 0.4 mg oral capsule: 1 cap(s) orally once a day (19 Sep 2024 22:21)  travoprost 0.004% ophthalmic solution: 1 drop(s) in each affected eye once a day (at bedtime) both eyes (19 Sep 2024 22:23)    MEDICATIONS:  MEDICATIONS  (STANDING):  aMIOdarone    Tablet 100 milliGRAM(s) Oral daily  atenolol  Tablet 12.5 milliGRAM(s) Oral daily  atorvastatin 80 milliGRAM(s) Oral at bedtime  brimonidine 0.2% Ophthalmic Solution 1 Drop(s) Right EYE two times a day  diphenhydrAMINE 25 milliGRAM(s) Oral daily  dorzolamide 2% Ophthalmic Solution 1 Drop(s) Right EYE every 8 hours  heparin  Infusion 900 Unit(s)/Hr (9 mL/Hr) IV Continuous <Continuous>  hydrocortisone 1% Cream 1 Application(s) Topical at bedtime  immune   globulin 10% (GAMMAGARD) IVPB 30 Gram(s) IV Intermittent every 24 hours  latanoprost 0.005% Ophthalmic Solution 1 Drop(s) Both EYES at bedtime  NIFEdipine XL 30 milliGRAM(s) Oral daily  pantoprazole    Tablet 40 milliGRAM(s) Oral before breakfast  polyethylene glycol 3350 17 Gram(s) Oral daily  senna 1 Tablet(s) Oral at bedtime  sodium phosphate 15 milliMole(s)/250 mL IVPB 15 milliMole(s) IV Intermittent once  spironolactone 25 milliGRAM(s) Oral daily  tamsulosin 0.4 milliGRAM(s) Oral at bedtime  tiotropium 2.5 MICROgram(s)/olodaterol 2.5 MICROgram(s) (STIOLTO) Inhaler 2 Puff(s) Inhalation daily    MEDICATIONS  (PRN):  acetaminophen     Tablet .. 650 milliGRAM(s) Oral every 6 hours PRN Mild Pain (1 - 3)  albuterol/ipratropium for Nebulization 3 milliLiter(s) Nebulizer every 6 hours PRN Shortness of Breath and/or Wheezing    PAST MEDICAL & SURGICAL HISTORY:      Vital Signs Last 24 Hrs  T(C): 36.6 (22 Sep 2024 09:35), Max: 36.6 (21 Sep 2024 16:23)  T(F): 97.8 (22 Sep 2024 09:35), Max: 97.9 (21 Sep 2024 16:23)  HR: 60 (22 Sep 2024 09:35) (52 - 60)  BP: 153/70 (22 Sep 2024 09:35) (148/65 - 153/70)  BP(mean): 94 (22 Sep 2024 05:15) (94 - 100)  RR: 16 (22 Sep 2024 09:35) (16 - 18)  SpO2: 96% (22 Sep 2024 09:35) (96% - 99%)    Parameters below as of 22 Sep 2024 09:35  Patient On (Oxygen Delivery Method): room air        09-21 @ 07:01  -  09-22 @ 07:00  --------------------------------------------------------  IN: 1138 mL / OUT: 1775 mL / NET: -637 mL    09-22 @ 07:01  -  09-22 @ 16:18  --------------------------------------------------------  IN: 843 mL / OUT: 500 mL / NET: 343 mL        PHYSICAL EXAM:  General: Alert, no acute distress  Lungs: Normal respiratory effort  Abdomen: Soft, nondistended, nontender    LABS:                        8.5    4.63  )-----------( 62       ( 22 Sep 2024 05:30 )             27.9     09-22    137  |  104  |  20  ----------------------------<  103[H]  4.3   |  25  |  1.46[H]    Ca    9.0      22 Sep 2024 05:30  Phos  2.7     09-22  Mg     1.7     09-22    TPro  6.1  /  Alb  3.3  /  TBili  0.8  /  DBili  0.2  /  AST  23  /  ALT  19  /  AlkPhos  74  09-21    PT/INR - ( 21 Sep 2024 05:30 )   PT: 15.3 sec;   INR: 1.35          PTT - ( 22 Sep 2024 05:30 )  PTT:78.4 sec    RADIOLOGY & ADDITIONAL STUDIES:  Reviewed.

## 2024-09-22 NOTE — PRE-OP CHECKLIST - SKIN PREP
GIB   on protonix   fu with GI     Herpes   on valacyclovir    PreOP  Based on current ACC/AHA guidelines, patient history and physical exam, the patient is considered to have low risk   echo personally reviewed , no emergent findings  no objection to proceed to OR today n/a

## 2024-09-22 NOTE — PROGRESS NOTE ADULT - SUBJECTIVE AND OBJECTIVE BOX
O/N: MISTI, VSS    Subjective: This AM, no complaints doing well, pruritus improved with benadryl.     aMIOdarone    Tablet 100  atenolol  Tablet 12.5  heparin  Infusion 900  NIFEdipine XL 30  spironolactone 25      Allergies    Toprol-XL (Unknown)    Intolerances    None    Vital Signs Last 24 Hrs  T(C): 36.6 (22 Sep 2024 09:35), Max: 36.6 (21 Sep 2024 16:23)  T(F): 97.8 (22 Sep 2024 09:35), Max: 97.9 (21 Sep 2024 16:23)  HR: 60 (22 Sep 2024 09:35) (50 - 60)  BP: 153/70 (22 Sep 2024 09:35) (126/59 - 159/85)  BP(mean): 94 (22 Sep 2024 05:15) (85 - 103)  RR: 16 (22 Sep 2024 09:35) (16 - 18)  SpO2: 96% (22 Sep 2024 09:35) (93% - 99%)    Parameters below as of 22 Sep 2024 09:35  Patient On (Oxygen Delivery Method): room air      I&O's Summary    21 Sep 2024 07:01  -  22 Sep 2024 07:00  --------------------------------------------------------  IN: 1138 mL / OUT: 1775 mL / NET: -637 mL    22 Sep 2024 07:01  -  22 Sep 2024 11:30  --------------------------------------------------------  IN: 180 mL / OUT: 200 mL / NET: -20 mL        PHYSICAL EXAM:  General: NAD, pt resting comfortably in bed  Pulm: No respiratory distress, nonlabored breathing, on room air  CVS: NSR, HDS  Abd: Soft, NT, ND  Extremities: WWP, no edema; red execrations noted on upper back and bilateral upper extremities with no pus, or oozing appreciated         LABS:                        8.5    4.63  )-----------( 62       ( 22 Sep 2024 05:30 )             27.9     09-22    137  |  104  |  20  ----------------------------<  103[H]  4.3   |  25  |  1.46[H]    Ca    9.0      22 Sep 2024 05:30  Phos  2.7     09-22  Mg     1.7     09-22    TPro  6.1  /  Alb  3.3  /  TBili  0.8  /  DBili  0.2  /  AST  23  /  ALT  19  /  AlkPhos  74  09-21    PT/INR - ( 21 Sep 2024 05:30 )   PT: 15.3 sec;   INR: 1.35          PTT - ( 22 Sep 2024 05:30 )  PTT:78.4 sec    PLEASE CHECK WHEN PRESENT:     [  ]Heart Failure     [  ] Acute     [  ] Acute on Chronic     [  ] Chronic  -------------------------------------------------------------------     [x  ]Diastolic [HFpEF]     [  ]Systolic [HFrEF]     [  ]Combined [HFpEF & HFrEF]  .................................................................................     [  ]Other:     [ ] Pulmonary Hypertension     [ ] Chronic A-fib     [ ] Persistet A-fib     [ ] Permanent A-fib     [x ] Paroxysmal A-fib     [x] Hypertensive Heart Disease  -------------------------------------------------------------------  [ ] Respiratory failure  [ ] Acute cor pulmonale  [ ] Asthma/COPD Exacerbation  [ ]COPD on home O2 (Chronic renal Failure)   [ ] Pleural effusion  [ ] Aspiration pneumonia  [x ] Obstructive Sleep Apnea  [ ]Atelectasis   [ ] Acute PE   -------------------------------------------------------------------  [  ]Acute Kidney Injury      [  ]Acute Tubular Necrosis      [  ]Reneal Medullary Necrosis     [  ]Renal Cortical Necrosis     [  ]Other Pathological Lesions:    [  ]CKD 1  [  ]CKD 2  [  ]CKD 3  [  ]CKD 4  [  ]CKD 5 (ESRD)  [  ]Other  -------------------------------------------------------------------  [  ]Diabetes  [  ] Diabetic PVD Ulcer  [  ] Neuropathic ulcer to DM  [  ] Diabetes with Nephropathy  [  ] Osteomyelitis due to diabetes  [  ] Hyperglycemia   [  ]hypoglycemia   --------------------------------------------------------------------  [  ]Malnutrition: See Nutrition Note  [  ]Cachexia  [  ]Other:   [  ]Supplement Ordered:  [  ]Morbid Obesity (BMI >=40]  [ ] Ileus  ---------------------------------------------------------------------  [ ] Sepsis/severe sepsis/septic shock  [ ] Noninfectious SIRS  [ ] UTI  [ ] Pneumonia  [ ] Thrombophlebitis     -----------------------------------------------------------------------  [ ] Acidosis/alkalosis  [ ] Fluid overload  [ ] Hypokalemia  [ ] Hyperkalemia  [x ] Hypomagnesemia  [ ] Hypophosphatemia  [ ] Hyperphosphatemia  ------------------------------------------------------------------------  [ ] Acute blood loss anemia  [ ] Post op blood loss anemia  [ ] Iron deficiency anemia  [ ] Anemia due to chronic disease  [ ] Hypercoagulable state  [x ] Thrombocytopenia  ----------------------------------------------------------------------  [x ] Cerebral infarction  [ ] Transient ischemia attack  [ ] Encephalopathy - Toxic or Metabolic    A/P: 81yo M former smoker, with pmhx of HTN, HLD, COPD, ENEDINA (home bipap), AVR (mechanical - on Coumadin goal 2.5-3.5), HFpEF, paroxysmal Afib, PAD, ITP (followed by hematologist Dr. Aden Saeed, baseline platelets 70k), CKD  (baseline Cr 1.2-1.3), scoliosis, cardiac arrest in 1995 2/2 V Tach s/p ICD placed, cardiac arrest early 2000s received shocks from  ICD, b/l carotid stenosis and stroke (R subacute to chronic infarct in occipital lobe, and small infarcts, ?age in R thalamus, and R MCA/PCA watershed). Carotid duplex done in office with R ICA >80% stenosis with soft plaque, velocities 501/111 cm/s, L ICA with <50% stenosis, velocities up to 122/38 cm/s. Per daughter, 1 month ago patient began complaining of intermittent LUE numbness, and then began having LUE weakness along with LLE numbness and mild weakness 1 week later which prompted further work up. Patient then found to have stroke on CT. Patient planned for cardiac cath 9/20 and R CEA on Monday 9/23. Of note, last INR 2.9. Admitted to vascular surgery service.    Vascular/Carotid Stenosis  - Plan for R CEA Monday 9/23  - s/p LHC on 9/20 for preop cardiac eval, with Dr. Guanako Thakkar  - Will d/w cards and heme re antiplatelet/anticoagulation plan after cardiac cath  - 2U PRBC on hold for OR    AVR (mechanical valve)  - Outpatient INR goal 2.5-3.5  - INR on admission 2.74, s/p vitamin K 2.5 mg PO 9/19  - Holding home coumadin, last dose 9/17  - c/w hep gtt, monitor PTT, goal PTT 60-80    HFpEF  - 9/11/24: OG negative for thrombi, EF 50-55%  - c/w Spironolactone  - c/w atenolol  - Holding Lasix, Losartan periop    HTN/HLD  - c/w Atenolol  - c/w Atorvastatin  - holding Lasix, Losartan periop    COPD  - c/w Albuterol  - c/w Spiriva  - duoneb prior to procedure  - c/w home bipap   - follows with Pulmonologist, Dr. Wright  - CT Chest (R) 03/16/23: Bilateral apical pleural parenchymal thickening. Mild paraseptal emphysema and centrilobular emphysema. There are mild paraseptal emphysema and centrilobular emphysema.   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 Saeed  - Per Dr. Saeed, cleared to proceed with CEA and continue current anticoagulation regimen so long as platelets >50k in the setting of suspected ITP    Anemia  MGUS  - Patient reports history of CARLEY, but on recent labwork obtained by Dr. Saeed, he is currently iron wnl and iron supplementation discontinued on 9/9.  - Outpatient labs show trend towards progressive anemia with low positive haptoglobin, Dr. Saeed planning possible outpatient bone marrow biopsy to evaluate for MM and other causes of anemia  - Will discuss with Dr. Saeed re thrombocytopenia and anemia in periop setting  - Will update iron studies and hemolysis labs  - Stool guiac     Diet: DASH then NPO @ [facility] for procedure  Activity: as stu  DVTppx: hep gtt, will hold on call to the OR  Dispo: pending  R CEA         O/N: MISTI, VSS    Subjective: This AM, no complaints doing well, pruritus improved with benadryl.     aMIOdarone    Tablet 100  atenolol  Tablet 12.5  heparin  Infusion 900  NIFEdipine XL 30  spironolactone 25      Allergies    Toprol-XL (Unknown)    Intolerances    None    Vital Signs Last 24 Hrs  T(C): 36.6 (22 Sep 2024 09:35), Max: 36.6 (21 Sep 2024 16:23)  T(F): 97.8 (22 Sep 2024 09:35), Max: 97.9 (21 Sep 2024 16:23)  HR: 60 (22 Sep 2024 09:35) (50 - 60)  BP: 153/70 (22 Sep 2024 09:35) (126/59 - 159/85)  BP(mean): 94 (22 Sep 2024 05:15) (85 - 103)  RR: 16 (22 Sep 2024 09:35) (16 - 18)  SpO2: 96% (22 Sep 2024 09:35) (93% - 99%)    Parameters below as of 22 Sep 2024 09:35  Patient On (Oxygen Delivery Method): room air      I&O's Summary    21 Sep 2024 07:01  -  22 Sep 2024 07:00  --------------------------------------------------------  IN: 1138 mL / OUT: 1775 mL / NET: -637 mL    22 Sep 2024 07:01  -  22 Sep 2024 11:30  --------------------------------------------------------  IN: 180 mL / OUT: 200 mL / NET: -20 mL        PHYSICAL EXAM:  General: NAD, pt resting comfortably in bed  Pulm: No respiratory distress, nonlabored breathing, on room air  CVS: NSR, HDS  Abd: Soft, NT, ND  Extremities: WWP, no edema; red execrations noted on upper back and bilateral upper extremities with no pus, or oozing appreciated         LABS:                        8.5    4.63  )-----------( 62       ( 22 Sep 2024 05:30 )             27.9     09-22    137  |  104  |  20  ----------------------------<  103[H]  4.3   |  25  |  1.46[H]    Ca    9.0      22 Sep 2024 05:30  Phos  2.7     09-22  Mg     1.7     09-22    TPro  6.1  /  Alb  3.3  /  TBili  0.8  /  DBili  0.2  /  AST  23  /  ALT  19  /  AlkPhos  74  09-21    PT/INR - ( 21 Sep 2024 05:30 )   PT: 15.3 sec;   INR: 1.35          PTT - ( 22 Sep 2024 05:30 )  PTT:78.4 sec    PLEASE CHECK WHEN PRESENT:     [  ]Heart Failure     [  ] Acute     [  ] Acute on Chronic     [  ] Chronic  -------------------------------------------------------------------     [x  ]Diastolic [HFpEF]     [  ]Systolic [HFrEF]     [  ]Combined [HFpEF & HFrEF]  .................................................................................     [  ]Other:     [ ] Pulmonary Hypertension     [ ] Chronic A-fib     [ ] Persistet A-fib     [ ] Permanent A-fib     [x ] Paroxysmal A-fib     [x] Hypertensive Heart Disease  -------------------------------------------------------------------  [ ] Respiratory failure  [ ] Acute cor pulmonale  [ ] Asthma/COPD Exacerbation  [ ]COPD on home O2 (Chronic renal Failure)   [ ] Pleural effusion  [ ] Aspiration pneumonia  [x ] Obstructive Sleep Apnea  [ ]Atelectasis   [ ] Acute PE   -------------------------------------------------------------------  [  ]Acute Kidney Injury      [  ]Acute Tubular Necrosis      [  ]Reneal Medullary Necrosis     [  ]Renal Cortical Necrosis     [  ]Other Pathological Lesions:    [  ]CKD 1  [  ]CKD 2  [  ]CKD 3  [  ]CKD 4  [  ]CKD 5 (ESRD)  [  ]Other  -------------------------------------------------------------------  [  ]Diabetes  [  ] Diabetic PVD Ulcer  [  ] Neuropathic ulcer to DM  [  ] Diabetes with Nephropathy  [  ] Osteomyelitis due to diabetes  [  ] Hyperglycemia   [  ]hypoglycemia   --------------------------------------------------------------------  [  ]Malnutrition: See Nutrition Note  [  ]Cachexia  [  ]Other:   [  ]Supplement Ordered:  [  ]Morbid Obesity (BMI >=40]  [ ] Ileus  ---------------------------------------------------------------------  [ ] Sepsis/severe sepsis/septic shock  [ ] Noninfectious SIRS  [ ] UTI  [ ] Pneumonia  [ ] Thrombophlebitis     -----------------------------------------------------------------------  [ ] Acidosis/alkalosis  [ ] Fluid overload  [ ] Hypokalemia  [ ] Hyperkalemia  [x ] Hypomagnesemia  [ ] Hypophosphatemia  [ ] Hyperphosphatemia  ------------------------------------------------------------------------  [ ] Acute blood loss anemia  [ ] Post op blood loss anemia  [ ] Iron deficiency anemia  [ ] Anemia due to chronic disease  [ ] Hypercoagulable state  [x ] Thrombocytopenia  ----------------------------------------------------------------------  [x ] Cerebral infarction  [ ] Transient ischemia attack  [ ] Encephalopathy - Toxic or Metabolic    A/P: 83yo M former smoker, with pmhx of HTN, HLD, COPD, ENEDINA (home bipap), AVR (mechanical - on Coumadin goal 2.5-3.5), HFpEF, paroxysmal Afib, PAD, ITP (followed by hematologist Dr. Aden Saeed, baseline platelets 70k), CKD  (baseline Cr 1.2-1.3), scoliosis, cardiac arrest in 1995 2/2 V Tach s/p ICD placed, cardiac arrest early 2000s received shocks from  ICD, b/l carotid stenosis and stroke (R subacute to chronic infarct in occipital lobe, and small infarcts, ?age in R thalamus, and R MCA/PCA watershed). Carotid duplex done in office with R ICA >80% stenosis with soft plaque, velocities 501/111 cm/s, L ICA with <50% stenosis, velocities up to 122/38 cm/s. Per daughter, 1 month ago patient began complaining of intermittent LUE numbness, and then began having LUE weakness along with LLE numbness and mild weakness 1 week later which prompted further work up. Patient then found to have stroke on CT. Patient planned for cardiac cath 9/20 and R CEA on Monday 9/23. Of note, last INR 2.9. Admitted to vascular surgery service.    Vascular/Carotid Stenosis  - Plan for R CEA Monday 9/23  - s/p diagnostic LHC on 9/20 for preop cardiac eval, with Dr. Guanako Thakkar  - Will d/w cards and heme re antiplatelet/anticoagulation plan after cardiac cath  - 2U PRBC on hold for OR    AVR (mechanical valve)  - Outpatient INR goal 2.5-3.5  - INR on admission 2.74, s/p vitamin K 2.5 mg PO 9/19  - Holding home coumadin, last dose 9/17  - c/w hep gtt, monitor PTT, goal PTT 60-80    HFpEF  - 9/11/24: OG negative for thrombi, EF 50-55%  - c/w Spironolactone  - c/w atenolol  - Holding Lasix, Losartan periop    HTN/HLD  - c/w Atenolol  - c/w Atorvastatin  - holding Lasix, Losartan periop    COPD  - c/w Albuterol  - c/w Spiriva  - duoneb prior to procedure  - c/w home bipap   - follows with Pulmonologist, Dr. Wright  - CT Chest (R) 03/16/23: Bilateral apical pleural parenchymal thickening. Mild paraseptal emphysema and centrilobular emphysema. There are mild paraseptal emphysema and centrilobular emphysema.   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 Saeed  - Per Dr. Saeed, cleared to proceed with CEA and continue current anticoagulation regimen so long as platelets >50k in the setting of suspected ITP  - c/w IVIG x 5 days (final day of 9/24)    Anemia  MGUS  - Patient reports history of CARLEY, but on recent labwork obtained by Dr. Saeed, he is currently iron wnl and iron supplementation discontinued on 9/9.  - Outpatient labs show trend towards progressive anemia with low positive haptoglobin, Dr. Saeed planning possible outpatient bone marrow biopsy to evaluate for MM and other causes of anemia  - Will discuss with Dr. Saeed re thrombocytopenia and anemia in periop setting  - Will update iron studies and hemolysis labs  - Stool guiac     Diet: DASH then NPO @ [facility] for procedure  Activity: as stu  DVTppx: hep gtt, will hold on call to the OR  Dispo: pending  R CEA

## 2024-09-23 ENCOUNTER — TRANSCRIPTION ENCOUNTER (OUTPATIENT)
Age: 84
End: 2024-09-23

## 2024-09-23 VITALS — TEMPERATURE: 98 F

## 2024-09-23 LAB
ANION GAP SERPL CALC-SCNC: 9 MMOL/L — SIGNIFICANT CHANGE UP (ref 5–17)
APTT BLD: 60.3 SEC — HIGH (ref 24.5–35.6)
BUN SERPL-MCNC: 21 MG/DL — SIGNIFICANT CHANGE UP (ref 7–23)
CALCIUM SERPL-MCNC: 9.1 MG/DL — SIGNIFICANT CHANGE UP (ref 8.4–10.5)
CHLORIDE SERPL-SCNC: 103 MMOL/L — SIGNIFICANT CHANGE UP (ref 96–108)
CO2 SERPL-SCNC: 24 MMOL/L — SIGNIFICANT CHANGE UP (ref 22–31)
CREAT SERPL-MCNC: 1.32 MG/DL — HIGH (ref 0.5–1.3)
DAT POLY-SP REAG RBC QL: POSITIVE — SIGNIFICANT CHANGE UP
EGFR: 53 ML/MIN/1.73M2 — LOW
GLUCOSE SERPL-MCNC: 103 MG/DL — HIGH (ref 70–99)
HCT VFR BLD CALC: 29.7 % — LOW (ref 39–50)
HGB BLD-MCNC: 9.1 G/DL — LOW (ref 13–17)
INR BLD: 1.12 — SIGNIFICANT CHANGE UP (ref 0.85–1.18)
MAGNESIUM SERPL-MCNC: 1.8 MG/DL — SIGNIFICANT CHANGE UP (ref 1.6–2.6)
MCHC RBC-ENTMCNC: 25.5 PG — LOW (ref 27–34)
MCHC RBC-ENTMCNC: 30.6 GM/DL — LOW (ref 32–36)
MCV RBC AUTO: 83.2 FL — SIGNIFICANT CHANGE UP (ref 80–100)
NRBC # BLD: 0 /100 WBCS — SIGNIFICANT CHANGE UP (ref 0–0)
PHOSPHATE SERPL-MCNC: 3.1 MG/DL — SIGNIFICANT CHANGE UP (ref 2.5–4.5)
PLATELET # BLD AUTO: 62 K/UL — LOW (ref 150–400)
POTASSIUM SERPL-MCNC: 4.2 MMOL/L — SIGNIFICANT CHANGE UP (ref 3.5–5.3)
POTASSIUM SERPL-SCNC: 4.2 MMOL/L — SIGNIFICANT CHANGE UP (ref 3.5–5.3)
PROTHROM AB SERPL-ACNC: 12.7 SEC — SIGNIFICANT CHANGE UP (ref 9.5–13)
RBC # BLD: 3.57 M/UL — LOW (ref 4.2–5.8)
RBC # FLD: 24.2 % — HIGH (ref 10.3–14.5)
SODIUM SERPL-SCNC: 136 MMOL/L — SIGNIFICANT CHANGE UP (ref 135–145)
WBC # BLD: 4.92 K/UL — SIGNIFICANT CHANGE UP (ref 3.8–10.5)
WBC # FLD AUTO: 4.92 K/UL — SIGNIFICANT CHANGE UP (ref 3.8–10.5)

## 2024-09-23 PROCEDURE — 83550 IRON BINDING TEST: CPT

## 2024-09-23 PROCEDURE — 82728 ASSAY OF FERRITIN: CPT

## 2024-09-23 PROCEDURE — 86077 PHYS BLOOD BANK SERV XMATCH: CPT

## 2024-09-23 PROCEDURE — 85027 COMPLETE CBC AUTOMATED: CPT

## 2024-09-23 PROCEDURE — 83615 LACTATE (LD) (LDH) ENZYME: CPT

## 2024-09-23 PROCEDURE — C1769: CPT

## 2024-09-23 PROCEDURE — 93005 ELECTROCARDIOGRAM TRACING: CPT

## 2024-09-23 PROCEDURE — 99233 SBSQ HOSP IP/OBS HIGH 50: CPT

## 2024-09-23 PROCEDURE — 85576 BLOOD PLATELET AGGREGATION: CPT

## 2024-09-23 PROCEDURE — 83735 ASSAY OF MAGNESIUM: CPT

## 2024-09-23 PROCEDURE — 83540 ASSAY OF IRON: CPT

## 2024-09-23 PROCEDURE — 86860 RBC ANTIBODY ELUTION: CPT

## 2024-09-23 PROCEDURE — 85610 PROTHROMBIN TIME: CPT

## 2024-09-23 PROCEDURE — 83010 ASSAY OF HAPTOGLOBIN QUANT: CPT

## 2024-09-23 PROCEDURE — 94640 AIRWAY INHALATION TREATMENT: CPT

## 2024-09-23 PROCEDURE — 80048 BASIC METABOLIC PNL TOTAL CA: CPT

## 2024-09-23 PROCEDURE — 85730 THROMBOPLASTIN TIME PARTIAL: CPT

## 2024-09-23 PROCEDURE — 99232 SBSQ HOSP IP/OBS MODERATE 35: CPT

## 2024-09-23 PROCEDURE — 97165 OT EVAL LOW COMPLEX 30 MIN: CPT

## 2024-09-23 PROCEDURE — 71046 X-RAY EXAM CHEST 2 VIEWS: CPT

## 2024-09-23 PROCEDURE — 86901 BLOOD TYPING SEROLOGIC RH(D): CPT

## 2024-09-23 PROCEDURE — 86900 BLOOD TYPING SEROLOGIC ABO: CPT

## 2024-09-23 PROCEDURE — C1887: CPT

## 2024-09-23 PROCEDURE — 86880 COOMBS TEST DIRECT: CPT

## 2024-09-23 PROCEDURE — 97161 PT EVAL LOW COMPLEX 20 MIN: CPT

## 2024-09-23 PROCEDURE — 84466 ASSAY OF TRANSFERRIN: CPT

## 2024-09-23 PROCEDURE — 86923 COMPATIBILITY TEST ELECTRIC: CPT

## 2024-09-23 PROCEDURE — 99238 HOSP IP/OBS DSCHRG MGMT 30/<: CPT

## 2024-09-23 PROCEDURE — 80076 HEPATIC FUNCTION PANEL: CPT

## 2024-09-23 PROCEDURE — 85045 AUTOMATED RETICULOCYTE COUNT: CPT

## 2024-09-23 PROCEDURE — 86850 RBC ANTIBODY SCREEN: CPT

## 2024-09-23 PROCEDURE — C1894: CPT

## 2024-09-23 PROCEDURE — 36415 COLL VENOUS BLD VENIPUNCTURE: CPT

## 2024-09-23 PROCEDURE — 84100 ASSAY OF PHOSPHORUS: CPT

## 2024-09-23 RX ORDER — ACETAMINOPHEN 325 MG
2 TABLET ORAL
Qty: 0 | Refills: 0 | DISCHARGE
Start: 2024-09-23

## 2024-09-23 RX ORDER — WARFARIN SODIUM 6 MG
1 TABLET ORAL
Refills: 0 | DISCHARGE

## 2024-09-23 RX ORDER — ENOXAPARIN SODIUM 150 MG/ML
80 INJECTION SUBCUTANEOUS
Qty: 11.2 | Refills: 0
Start: 2024-09-23 | End: 2024-09-29

## 2024-09-23 RX ORDER — ACETAMINOPHEN 325 MG
650 TABLET ORAL ONCE
Refills: 0 | Status: COMPLETED | OUTPATIENT
Start: 2024-09-23 | End: 2024-09-23

## 2024-09-23 RX ORDER — ENOXAPARIN SODIUM 150 MG/ML
80 INJECTION SUBCUTANEOUS EVERY 12 HOURS
Refills: 0 | Status: DISCONTINUED | OUTPATIENT
Start: 2024-09-23 | End: 2024-09-23

## 2024-09-23 RX ORDER — MAGNESIUM SULFATE 500 MG/ML
1 VIAL (ML) INJECTION ONCE
Refills: 0 | Status: COMPLETED | OUTPATIENT
Start: 2024-09-23 | End: 2024-09-23

## 2024-09-23 RX ORDER — ENOXAPARIN SODIUM 150 MG/ML
75 INJECTION SUBCUTANEOUS
Qty: 7 | Refills: 0
Start: 2024-09-23 | End: 2024-09-29

## 2024-09-23 RX ADMIN — Medication 650 MILLIGRAM(S): at 10:50

## 2024-09-23 RX ADMIN — IMMUNE GLOBULIN (HUMAN) 75 GRAM(S): 10 INJECTION INTRAVENOUS; SUBCUTANEOUS at 11:45

## 2024-09-23 RX ADMIN — Medication 25 MILLIGRAM(S): at 10:50

## 2024-09-23 RX ADMIN — Medication 100 GRAM(S): at 07:58

## 2024-09-23 RX ADMIN — ENOXAPARIN SODIUM 80 MILLIGRAM(S): 150 INJECTION SUBCUTANEOUS at 17:52

## 2024-09-23 RX ADMIN — Medication 650 MILLIGRAM(S): at 06:42

## 2024-09-23 RX ADMIN — AMIODARONE HYDROCHLORIDE 100 MILLIGRAM(S): 50 INJECTION, SOLUTION INTRAVENOUS at 05:20

## 2024-09-23 RX ADMIN — PANTOPRAZOLE SODIUM 40 MILLIGRAM(S): 40 TABLET, DELAYED RELEASE ORAL at 05:20

## 2024-09-23 RX ADMIN — BRIMONIDINE TARTRATE 1 DROP(S): 1 SOLUTION/ DROPS OPHTHALMIC at 05:24

## 2024-09-23 RX ADMIN — Medication 650 MILLIGRAM(S): at 05:42

## 2024-09-23 RX ADMIN — Medication 1 DROP(S): at 17:53

## 2024-09-23 RX ADMIN — BRIMONIDINE TARTRATE 1 DROP(S): 1 SOLUTION/ DROPS OPHTHALMIC at 17:53

## 2024-09-23 RX ADMIN — ATENOLOL 12.5 MILLIGRAM(S): 25 TABLET ORAL at 05:21

## 2024-09-23 RX ADMIN — Medication 1 DROP(S): at 05:24

## 2024-09-23 RX ADMIN — Medication 30 MILLIGRAM(S): at 05:20

## 2024-09-23 RX ADMIN — TIOTROPIUM BROMIDE AND OLODATEROL 2 PUFF(S): 3.124; 2.736 SPRAY, METERED RESPIRATORY (INHALATION) at 11:50

## 2024-09-23 NOTE — OCCUPATIONAL THERAPY INITIAL EVALUATION ADULT - PERTINENT HX OF CURRENT PROBLEM, REHAB EVAL
81yo M former smoker, with pmhx of HTN, HLD, COPD, ENEDINA (home bipap), AVR (mechanical - on Coumadin goal 2.5-3.5), HFpEF, paroxysmal Afib, PAD, ITP (followed by hematologist Dr. Aedn Saeed, baseline platelets 70k), CKD  (baseline Cr 1.2-1.3), scoliosis, cardiac arrest in 1995 2/2 V Tach s/p ICD placed, cardiac arrest early 2000s received shocks from  ICD, b/l carotid stenosis and stroke (R subacute to chronic infarct in occipital lobe, and small infarcts, ?age in R thalamus, and R MCA/PCA watershed). Carotid duplex done in office with R ICA >80% stenosis with soft plaque, velocities 501/111 cm/s, L ICA with <50% stenosis, velocities up to 122/38 cm/s. Per daughter, 1 month ago patient began complaining of intermittent LUE numbness, and then began having LUE weakness along with LLE numbness and mild weakness 1 week later which prompted further work up. Patient then found to have stroke on CT. Patient planned for cardiac cath 9/20 and R CEA on Monday 9/23. Of note, last INR 2.9. Admitted to vascular surgery service

## 2024-09-23 NOTE — OCCUPATIONAL THERAPY INITIAL EVALUATION ADULT - PHYSICAL ASSIST/NONPHYSICAL ASSIST:DRESS UPPER BODY, OT EVAL
Patient seen, exam deferred due to covid-19 status.    S:  No events OTN. On optiflow, requirements slowly improving.    O:  Afebrile, P 65, R 27, /66, Pox 97% on 90%/55 LPM HFNC  I/O     Current Meds:  • insulin lispro   Subcutaneous Nightly   • insulin lispro   Subcutaneous TID WC   • dexamethasone  20 mg Intravenous Daily    Followed by   • [START ON 12/1/2020] dexamethasone  10 mg Intravenous Daily   • remdesivir 100 mg/250 mL NS IVPB  100 mg Intravenous Daily at noon   • sodium chloride (PF)  2 mL Intracatheter 2 times per day   • enoxaparin  40 mg Subcutaneous Daily   • ascorbic acid  500 mg Oral Daily   • zinc sulfate  220 mg Oral Daily with breakfast   Gtts: None.    Labs/Diagnostic Studies:  134/4.4/103/21/23/0.73/123  Albumin 2.9  AST 72, ALT 53, AlkP 92, T bili 0.5    Ferritin 756  CRP 13  Ddimer yesterday 1.15  Wbc 7.4, hemoglobin 14.3, platelets 212  AP chest, reviewed - Subtle interstitial infiltrates, most prominent bases.    Assessment/Plan   Patient is a 60 year old male with ?mild asthma admitted 11/25 with COVID-19     Acute respiratory failure with hypoxia  COVID-19 pneumonia  Abnormal CXR  Leukopenia  Mild hyponatremia  ?Mild asthma without flare     Recommendations:  Wean HFNC as tolerated, maintain goal sats 90% or greater.  Low threshold to add Bipap if any increased work of breathing  Remdesivir x5 days  Dexamethasone x10 days -> increased to higher dosing, 20 x 5, followed by 10 x 5 days.  Keep on dry side.  Start albuterol MDI Q6H.  Optimize nutrition.  DVT prophylaxis with enoxaparin  Full code     I spent 30 minutes personally caring for this critically ill patient. This time is exclusive of any teaching or procedures.     Jaleel Martinez MD  Pulmonary [facility]   verbal cues/nonverbal cues (demo/gestures)/1 person assist

## 2024-09-23 NOTE — PROGRESS NOTE ADULT - SUBJECTIVE AND OBJECTIVE BOX
Subjective:  No acute events overnight.  Patient is doing well today without new complaints.  Denies HA, CP, SOB, abdominal pain, nausea, vomiting, fever, chills or diarrhea.     All communication via [employer] (ID #: [id number])    Objective:   Vital Signs Last 24 Hrs  T(C): 36.4 (23 Sep 2024 12:02), Max: 36.9 (23 Sep 2024 08:47)  T(F): 97.5 (23 Sep 2024 11:38), Max: 98.5 (23 Sep 2024 08:47)  HR: 50 (23 Sep 2024 12:02) (50 - 58)  BP: 134/63 (23 Sep 2024 12:02) (133/61 - 160/62)  BP(mean): 79 (23 Sep 2024 12:02) (79 - 95)  RR: 18 (23 Sep 2024 12:02) (17 - 18)  SpO2: 96% (23 Sep 2024 12:02) (95% - 99%)    Parameters below as of 23 Sep 2024 11:50  Patient On (Oxygen Delivery Method): room air    Physical Exam:   -Gen: NAD, resting in bed  -HEENT: EOMI, PERRL, no scleral icterus  -CV: normal S1 and S2  -Lungs: CTABL, normal respiratory effort on BiPAP  -Ab: soft, NT, ND, normal BS  -Ext: no LE edema  -Neuro: A&O x 3    Labs:                        9.1    4.92  )-----------( 62       ( 23 Sep 2024 05:30 )             29.7       09-23    136  |  103  |  21  ----------------------------<  103[H]  4.2   |  24  |  1.32[H]    Ca    9.1      23 Sep 2024 05:30  Phos  3.1     09-23  Mg     1.8     09-23     Medications:  MEDICATIONS  (STANDING):  aMIOdarone    Tablet 100 milliGRAM(s) Oral daily  atenolol  Tablet 12.5 milliGRAM(s) Oral daily  atorvastatin 80 milliGRAM(s) Oral at bedtime  brimonidine 0.2% Ophthalmic Solution 1 Drop(s) Right EYE two times a day  diphenhydrAMINE 25 milliGRAM(s) Oral daily  dorzolamide 2% Ophthalmic Solution 1 Drop(s) Right EYE every 8 hours  hydrocortisone 1% Cream 1 Application(s) Topical at bedtime  immune   globulin 10% (GAMMAGARD) IVPB 30 Gram(s) IV Intermittent every 24 hours  latanoprost 0.005% Ophthalmic Solution 1 Drop(s) Both EYES at bedtime  NIFEdipine XL 30 milliGRAM(s) Oral daily  pantoprazole    Tablet 40 milliGRAM(s) Oral before breakfast  polyethylene glycol 3350 17 Gram(s) Oral daily  senna 1 Tablet(s) Oral at bedtime  spironolactone 25 milliGRAM(s) Oral daily  tamsulosin 0.4 milliGRAM(s) Oral at bedtime  tiotropium 2.5 MICROgram(s)/olodaterol 2.5 MICROgram(s) (STIOLTO) Inhaler 2 Puff(s) Inhalation daily    MEDICATIONS  (PRN):  acetaminophen     Tablet .. 650 milliGRAM(s) Oral every 6 hours PRN Mild Pain (1 - 3)  albuterol/ipratropium for Nebulization 3 milliLiter(s) Nebulizer every 6 hours PRN Shortness of Breath and/or Wheezing

## 2024-09-23 NOTE — PROGRESS NOTE ADULT - SUBJECTIVE AND OBJECTIVE BOX
PLEASE CHECK WHEN PRESENT:     [  ]Heart Failure     [  ] Acute     [  ] Acute on Chronic     [  ] Chronic  -------------------------------------------------------------------     [x  ]Diastolic [HFpEF]     [  ]Systolic [HFrEF]     [  ]Combined [HFpEF & HFrEF]  .................................................................................     [  ]Other:     [ ] Pulmonary Hypertension     [ ] Chronic A-fib     [ ] Persistet A-fib     [ ] Permanent A-fib     [x ] Paroxysmal A-fib     [x] Hypertensive Heart Disease  -------------------------------------------------------------------  [ ] Respiratory failure  [ ] Acute cor pulmonale  [ ] Asthma/COPD Exacerbation  [ ]COPD on home O2 (Chronic renal Failure)   [ ] Pleural effusion  [ ] Aspiration pneumonia  [x ] Obstructive Sleep Apnea  [ ]Atelectasis   [ ] Acute PE   -------------------------------------------------------------------  [  ]Acute Kidney Injury      [  ]Acute Tubular Necrosis      [  ]Reneal Medullary Necrosis     [  ]Renal Cortical Necrosis     [  ]Other Pathological Lesions:    [  ]CKD 1  [  ]CKD 2  [  ]CKD 3  [  ]CKD 4  [  ]CKD 5 (ESRD)  [  ]Other  -------------------------------------------------------------------  [  ]Diabetes  [  ] Diabetic PVD Ulcer  [  ] Neuropathic ulcer to DM  [  ] Diabetes with Nephropathy  [  ] Osteomyelitis due to diabetes  [  ] Hyperglycemia   [  ]hypoglycemia   --------------------------------------------------------------------  [  ]Malnutrition: See Nutrition Note  [  ]Cachexia  [  ]Other:   [  ]Supplement Ordered:  [  ]Morbid Obesity (BMI >=40]  [ ] Ileus  ---------------------------------------------------------------------  [ ] Sepsis/severe sepsis/septic shock  [ ] Noninfectious SIRS  [ ] UTI  [ ] Pneumonia  [ ] Thrombophlebitis     -----------------------------------------------------------------------  [ ] Acidosis/alkalosis  [ ] Fluid overload  [ ] Hypokalemia  [ ] Hyperkalemia  [x ] Hypomagnesemia  [ ] Hypophosphatemia  [ ] Hyperphosphatemia  ------------------------------------------------------------------------  [ ] Acute blood loss anemia  [ ] Post op blood loss anemia  [ ] Iron deficiency anemia  [ ] Anemia due to chronic disease  [ ] Hypercoagulable state  [x ] Thrombocytopenia  ----------------------------------------------------------------------  [x ] Cerebral infarction  [ ] Transient ischemia attack  [ ] Encephalopathy - Toxic or Metabolic      A/P: 83yo M former smoker, with pmhx of HTN, HLD, COPD, ENEDINA (home bipap), AVR (mechanical - on Coumadin goal 2.5-3.5), HFpEF, paroxysmal Afib, PAD, ITP (followed by hematologist Dr. Aden Saeed, baseline platelets 70k), CKD  (baseline Cr 1.2-1.3), scoliosis, cardiac arrest in 1995 2/2 V Tach s/p ICD placed, cardiac arrest early 2000s received shocks from  ICD, b/l carotid stenosis and stroke (R subacute to chronic infarct in occipital lobe, and small infarcts, ?age in R thalamus, and R MCA/PCA watershed). Carotid duplex done in office with R ICA >80% stenosis with soft plaque, velocities 501/111 cm/s, L ICA with <50% stenosis, velocities up to 122/38 cm/s. Per daughter, 1 month ago patient began complaining of intermittent LUE numbness, and then began having LUE weakness along with LLE numbness and mild weakness 1 week later which prompted further work up. Patient then found to have stroke on CT. Patient planned for cardiac cath 9/20 and R CEA on Monday 9/23. Of note, last INR 2.9. Admitted to vascular surgery service.    Vascular/Carotid Stenosis  - Plan for R CEA Monday 9/23  - s/p diagnostic LHC on 9/20 for preop cardiac eval, with Dr. Guanako Thakkar  - Jimmie d/w cards and heme re antiplatelet/anticoagulation plan after cardiac cath  - 2U PRBC on hold for OR    AVR (mechanical valve)  - Outpatient INR goal 2.5-3.5  - INR on admission 2.74, s/p vitamin K 2.5 mg PO 9/19  - Holding home coumadin, last dose 9/17  - c/w hep gtt, monitor PTT, goal PTT 60-80    HFpEF  - 9/11/24: OG negative for thrombi, EF 50-55%  - c/w Spironolactone  - c/w atenolol  - Holding Lasix and Losartan periop    HTN/HLD  - Started nifedipine 30mg XL while holding home lasix and losartan  - c/w Atenolol  - c/w Atorvastatin  - holding Lasix, Losartan periop    COPD  - c/w Duoneb PRN, duoneb prior to procedure  - c/w Stioltol    - c/w home bipap   - follows with Pulmonologist, Dr. Wright  - CT Chest (R) 03/16/23: Bilateral apical pleural parenchymal thickening. Mild paraseptal emphysema and centrilobular emphysema. There are mild paraseptal emphysema and centrilobular emphysema.   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 Saeed  - Per Dr. Saeed, cleared to proceed with CEA and continue current anticoagulation regimen so long as platelets >50k in the setting of suspected ITP  - c/w IVIG x 5 days (final day of 9/24)    Anemia  MGUS  - Patient reports history of CARLEY, but on recent labwork obtained by Dr. Saeed, iron studies wnl and iron supplementation discontinued on 9/9, rpt iron studies wnl.   - Outpatient labs show trend towards progressive anemia with low positive haptoglobin, Dr. Saeed planning possible outpatient bone marrow biopsy to evaluate for MM and other causes of anemia  - Rpt hemolysis labs: low haptoglobin, elevated LDH, pending direct pilar profile  - Pending stool guiac  - Appreciate GI recs    Diet: DASH, NPO @ [facility] for procedure  Activity: as stu  DVTppx: hep gtt, will hold on call to the OR  Dispo: pending R CEA, postoperatively will go to SICU             PLEASE CHECK WHEN PRESENT:     [  ]Heart Failure     [  ] Acute     [  ] Acute on Chronic     [  ] Chronic  -------------------------------------------------------------------     [x  ]Diastolic [HFpEF]     [  ]Systolic [HFrEF]     [  ]Combined [HFpEF & HFrEF]  .................................................................................     [  ]Other:     [ ] Pulmonary Hypertension     [ ] Chronic A-fib     [ ] Persistet A-fib     [ ] Permanent A-fib     [x ] Paroxysmal A-fib     [x] Hypertensive Heart Disease  -------------------------------------------------------------------  [ ] Respiratory failure  [ ] Acute cor pulmonale  [ ] Asthma/COPD Exacerbation  [ ]COPD on home O2 (Chronic renal Failure)   [ ] Pleural effusion  [ ] Aspiration pneumonia  [x ] Obstructive Sleep Apnea  [ ]Atelectasis   [ ] Acute PE   -------------------------------------------------------------------  [  ]Acute Kidney Injury      [  ]Acute Tubular Necrosis      [  ]Reneal Medullary Necrosis     [  ]Renal Cortical Necrosis     [  ]Other Pathological Lesions:    [  ]CKD 1  [  ]CKD 2  [  ]CKD 3  [  ]CKD 4  [  ]CKD 5 (ESRD)  [  ]Other  -------------------------------------------------------------------  [  ]Diabetes  [  ] Diabetic PVD Ulcer  [  ] Neuropathic ulcer to DM  [  ] Diabetes with Nephropathy  [  ] Osteomyelitis due to diabetes  [  ] Hyperglycemia   [  ]hypoglycemia   --------------------------------------------------------------------  [  ]Malnutrition: See Nutrition Note  [  ]Cachexia  [  ]Other:   [  ]Supplement Ordered:  [  ]Morbid Obesity (BMI >=40]  [ ] Ileus  ---------------------------------------------------------------------  [ ] Sepsis/severe sepsis/septic shock  [ ] Noninfectious SIRS  [ ] UTI  [ ] Pneumonia  [ ] Thrombophlebitis     -----------------------------------------------------------------------  [ ] Acidosis/alkalosis  [ ] Fluid overload  [ ] Hypokalemia  [ ] Hyperkalemia  [x ] Hypomagnesemia  [ ] Hypophosphatemia  [ ] Hyperphosphatemia  ------------------------------------------------------------------------  [ ] Acute blood loss anemia  [ ] Post op blood loss anemia  [ ] Iron deficiency anemia  [ ] Anemia due to chronic disease  [ ] Hypercoagulable state  [x ] Thrombocytopenia  ----------------------------------------------------------------------  [x ] Cerebral infarction  [ ] Transient ischemia attack  [ ] Encephalopathy - Toxic or Metabolic      A/P: 83yo M former smoker, with pmhx of HTN, HLD, COPD, ENEDINA (home bipap), AVR (mechanical - on Coumadin goal 2.5-3.5), HFpEF, paroxysmal Afib, PAD, ITP (followed by hematologist Dr. Aden Saeed, baseline platelets 70k), CKD  (baseline Cr 1.2-1.3), scoliosis, cardiac arrest in 1995 2/2 V Tach s/p ICD placed, cardiac arrest early 2000s received shocks from  ICD, b/l carotid stenosis and stroke (R subacute to chronic infarct in occipital lobe, and small infarcts, ?age in R thalamus, and R MCA/PCA watershed). Carotid duplex done in office with R ICA >80% stenosis with soft plaque, velocities 501/111 cm/s, L ICA with <50% stenosis, velocities up to 122/38 cm/s. Per daughter, 1 month ago patient began complaining of intermittent LUE numbness, and then began having LUE weakness along with LLE numbness and mild weakness 1 week later which prompted further work up. Patient then found to have stroke on CT. Patient planned for cardiac cath 9/20 and R CEA on Monday 9/23. Of note, last INR 2.9. Admitted to vascular surgery service.    Vascular/Carotid Stenosis  - Plan for R CEA Monday 9/23  - s/p diagnostic LHC on 9/20 for preop cardiac eval, with Dr. Guanako Thakkar  - Jimmie d/w cards and heme re antiplatelet/anticoagulation plan after cardiac cath  - 2U PRBC on hold for OR    AVR (mechanical valve)  - Outpatient INR goal 2.5-3.5  - INR on admission 2.74, s/p vitamin K 2.5 mg PO 9/19  - Holding home coumadin, last dose 9/17  - c/w hep gtt, monitor PTT, goal PTT 60-80    HFpEF  - 9/11/24: OG negative for thrombi, EF 50-55%  - c/w Spironolactone  - c/w atenolol  - Holding Lasix and Losartan periop    HTN/HLD  - Started nifedipine 30mg XL while holding home lasix and losartan  - c/w Atenolol  - c/w Atorvastatin  - holding Lasix, Losartan periop    COPD  - c/w Duoneb PRN, duoneb prior to procedure  - c/w Stioltol    - c/w home bipap   - follows with Pulmonologist, Dr. Wright  - CT Chest (R) 03/16/23: Bilateral apical pleural parenchymal thickening. Mild paraseptal emphysema and centrilobular emphysema. There are mild paraseptal emphysema and centrilobular emphysema.   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 Saeed  - Per Dr. Saeed, cleared to proceed with CEA and continue current anticoagulation regimen so long as platelets >50k in the setting of suspected ITP  - c/w IVIG x 5 days (final day of 9/24)    Anemia  MGUS  - Patient reports history of CARLEY, but on recent labwork obtained by Dr. Saeed, iron studies wnl and iron supplementation discontinued on 9/9, rpt iron studies wnl.   - Outpatient labs show trend towards progressive anemia with low positive haptoglobin, Dr. Saeed planning possible outpatient bone marrow biopsy to evaluate for MM and other causes of anemia  - Rpt hemolysis labs: low haptoglobin, elevated LDH, pending direct pilar profile  - Pending stool guiac  - Appreciate GI recs    Diet: DASH, NPO @ [facility] for procedure  Activity: as stu  DVTppx: hep gtt, will hold on call to the OR  PT eval: ordered  Dispo: pending R CEA, postoperatively will go to SICU     O/N: IV lasix given for SOB, symptoms improved and voided 1250cc. direct pilar poly and IgG positive but C3 and eluate negative, blood bank no antibodies in blood, no need for type & cross.      S: Patient does not have any complaints    O: Examined in bed resting comfortably     ROS: Denies headache, blurred vision, chest pain, SOB, abdominal pain, nausea or vomiting.         aMIOdarone    Tablet 100  atenolol  Tablet 12.5  NIFEdipine XL 30  spironolactone 25      Allergies    Toprol-XL (Unknown)    Intolerances        Vital Signs Last 24 Hrs  T(C): 36.6 (23 Sep 2024 05:15), Max: 36.6 (22 Sep 2024 09:35)  T(F): 97.8 (23 Sep 2024 05:15), Max: 97.8 (22 Sep 2024 09:35)  HR: 53 (23 Sep 2024 05:54) (53 - 64)  BP: 159/61 (23 Sep 2024 05:15) (128/62 - 176/76)  BP(mean): 94 (23 Sep 2024 05:15) (94 - 95)  RR: 18 (23 Sep 2024 05:54) (16 - 18)  SpO2: 97% (23 Sep 2024 05:54) (96% - 99%)    Parameters below as of 23 Sep 2024 05:54  Patient On (Oxygen Delivery Method): room air      I&O's Summary    22 Sep 2024 07:01  -  23 Sep 2024 07:00  --------------------------------------------------------  IN: 2063 mL / OUT: 2350 mL / NET: -287 mL        Physical Exam:  General: alert and awake, NAD  Pulmonary: no respiratory distress  Cardiovascular: RRR  Abdominal: soft  Extremities: warm, well perfused, motor/sensory intact w/out deficits      LABS:                        9.1    4.92  )-----------( 62       ( 23 Sep 2024 05:30 )             29.7     09-23    136  |  103  |  21  ----------------------------<  103[H]  4.2   |  24  |  1.32[H]    Ca    9.1      23 Sep 2024 05:30  Phos  3.1     09-23  Mg     1.8     09-23      PT/INR - ( 23 Sep 2024 05:30 )   PT: 12.7 sec;   INR: 1.12          PTT - ( 23 Sep 2024 05:30 )  PTT:60.3 sec    Radiology and Additional Studies:    PLEASE CHECK WHEN PRESENT:     [  ]Heart Failure     [  ] Acute     [  ] Acute on Chronic     [  ] Chronic  -------------------------------------------------------------------     [x  ]Diastolic [HFpEF]     [  ]Systolic [HFrEF]     [  ]Combined [HFpEF & HFrEF]  .................................................................................     [  ]Other:     [ ] Pulmonary Hypertension     [ ] Chronic A-fib     [ ] Persistet A-fib     [ ] Permanent A-fib     [x ] Paroxysmal A-fib     [x] Hypertensive Heart Disease  -------------------------------------------------------------------  [ ] Respiratory failure  [ ] Acute cor pulmonale  [ ] Asthma/COPD Exacerbation  [ ]COPD on home O2 (Chronic renal Failure)   [ ] Pleural effusion  [ ] Aspiration pneumonia  [x ] Obstructive Sleep Apnea  [ ]Atelectasis   [ ] Acute PE   -------------------------------------------------------------------  [  ]Acute Kidney Injury      [  ]Acute Tubular Necrosis      [  ]Reneal Medullary Necrosis     [  ]Renal Cortical Necrosis     [  ]Other Pathological Lesions:    [  ]CKD 1  [  ]CKD 2  [  ]CKD 3  [  ]CKD 4  [  ]CKD 5 (ESRD)  [  ]Other  -------------------------------------------------------------------  [  ]Diabetes  [  ] Diabetic PVD Ulcer  [  ] Neuropathic ulcer to DM  [  ] Diabetes with Nephropathy  [  ] Osteomyelitis due to diabetes  [  ] Hyperglycemia   [  ]hypoglycemia   --------------------------------------------------------------------  [  ]Malnutrition: See Nutrition Note  [  ]Cachexia  [  ]Other:   [  ]Supplement Ordered:  [  ]Morbid Obesity (BMI >=40]  [ ] Ileus  ---------------------------------------------------------------------  [ ] Sepsis/severe sepsis/septic shock  [ ] Noninfectious SIRS  [ ] UTI  [ ] Pneumonia  [ ] Thrombophlebitis     -----------------------------------------------------------------------  [ ] Acidosis/alkalosis  [ ] Fluid overload  [ ] Hypokalemia  [ ] Hyperkalemia  [x ] Hypomagnesemia  [ ] Hypophosphatemia  [ ] Hyperphosphatemia  ------------------------------------------------------------------------  [ ] Acute blood loss anemia  [ ] Post op blood loss anemia  [ ] Iron deficiency anemia  [ ] Anemia due to chronic disease  [ ] Hypercoagulable state  [x ] Thrombocytopenia  ----------------------------------------------------------------------  [x ] Cerebral infarction  [ ] Transient ischemia attack  [ ] Encephalopathy - Toxic or Metabolic      A/P: 81yo M former smoker, with pmhx of HTN, HLD, COPD, ENEDINA (home bipap), AVR (mechanical - on Coumadin goal 2.5-3.5), HFpEF, paroxysmal Afib, PAD, ITP (followed by hematologist Dr. Aden Saeed, baseline platelets 70k), CKD  (baseline Cr 1.2-1.3), scoliosis, cardiac arrest in 1995 2/2 V Tach s/p ICD placed, cardiac arrest early 2000s received shocks from  ICD, b/l carotid stenosis and stroke (R subacute to chronic infarct in occipital lobe, and small infarcts, ?age in R thalamus, and R MCA/PCA watershed). Carotid duplex done in office with R ICA >80% stenosis with soft plaque, velocities 501/111 cm/s, L ICA with <50% stenosis, velocities up to 122/38 cm/s. Per daughter, 1 month ago patient began complaining of intermittent LUE numbness, and then began having LUE weakness along with LLE numbness and mild weakness 1 week later which prompted further work up. Patient then found to have stroke on CT. Patient planned for cardiac cath 9/20 and R CEA on Monday 9/23. Of note, last INR 2.9. Admitted to vascular surgery service.    Vascular/Carotid Stenosis  - Plan for R CEA Monday 9/23  - s/p diagnostic LHC on 9/20 for preop cardiac eval, with Dr. Guanako Thakkar  - Jimmie d/w cards and heme re antiplatelet/anticoagulation plan after cardiac cath  - 2U PRBC on hold for OR    AVR (mechanical valve)  - Outpatient INR goal 2.5-3.5  - INR on admission 2.74, s/p vitamin K 2.5 mg PO 9/19  - Holding home coumadin, last dose 9/17  - c/w hep gtt, monitor PTT, goal PTT 60-80    HFpEF  - 9/11/24: OG negative for thrombi, EF 50-55%  - c/w Spironolactone  - c/w atenolol  - Holding Lasix and Losartan periop    HTN/HLD  - Started nifedipine 30mg XL while holding home lasix and losartan  - c/w Atenolol  - c/w Atorvastatin  - holding Lasix, Losartan periop    COPD  - c/w Duoneb PRN, duoneb prior to procedure  - c/w Stioltol    - c/w home bipap   - follows with Pulmonologist, Dr. Wright  - CT Chest ([facility]) 03/16/23: Bilateral apical pleural parenchymal thickening. Mild paraseptal emphysema and centrilobular emphysema. There are mild paraseptal emphysema and centrilobular emphysema.   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 Saeed  - Per Dr. Saeed, cleared to proceed with CEA and continue current anticoagulation regimen so long as platelets >50k in the setting of suspected ITP  - c/w IVIG x 5 days (final day of 9/24)    Anemia  MGUS  - Patient reports history of CARLEY, but on recent labwork obtained by Dr. Saeed, iron studies wnl and iron supplementation discontinued on 9/9, rpt iron studies wnl.   - Outpatient labs show trend towards progressive anemia with low positive haptoglobin, Dr. Saeed planning possible outpatient bone marrow biopsy to evaluate for MM and other causes of anemia  - Rpt hemolysis labs: low haptoglobin, elevated LDH, pending direct pilar profile  - Pending stool guiac  - Appreciate GI recs    Diet: DASH, NPO @ [facility] for procedure  Activity: as stu  DVTppx: hep gtt, will hold on call to the OR  PT eval: ordered  Dispo: holding hep gtt, pending R CEA, postoperatively will go to SICU

## 2024-09-23 NOTE — OCCUPATIONAL THERAPY INITIAL EVALUATION ADULT - ADDITIONAL COMMENTS
info received from family at bedside- since CVA in 8/24, pt. has been residing w. family in elevator accessible building where he has been receiving assistance for ADLs and mobility with use of cane. Pt. family has purchased transport chair, reports unsteady gait. Prior to, resides in a home w. 2 FOS and was I prior to CVA

## 2024-09-23 NOTE — PHYSICAL THERAPY INITIAL EVALUATION ADULT - MODALITIES TREATMENT COMMENTS
slight left sided smile asymmetry, unable to further assess due to multiple interruptions in patient's room + communication with family regarding course + discharge recommendation

## 2024-09-23 NOTE — PROGRESS NOTE ADULT - ASSESSMENT
82M h/o CVA (~1 mo ago with carotid stenosis), mechanical AVR (on warfarin), afib, VT c/b cardiac arrest s/p ICD, ITP, anemia, remote PUD admitted for cardiac cath and CEA.    Pt with Hgb drop from 12.5 (4/2024) -> 11.0 (8/2024) -> 9.8 this admission without significant overt GI bleeding and no clear evidence of iron deficiency dating back to labs from April 2024.   No overt evidence of iron deficiency, but has been on iron repletion so picture less clear.    Separate from question of CARLEY  is the etiology of what is a possible hemolytic anemia which has been noted on this admission as well as outpatient labs from Aug 2024 (see HIE) which coincides with downtrend in anemia (moderate schistocytes with elevated LDH and undetectable haptoglobin).  To this end, it is very possible that progressive anemia is 2/2 chronic hemolytic anemia rather than CARLEY particularly given replete iron stores since April 2024.  Additionally, presence of thrombocytopenia, while attributed to ITP, should prompt consideration of underlying thromboctic microangiopathy given presence of hemolytic anemia.    I discussed these findings with the pt's daughter this AM who acknowledged known hx of hemolytic anemia but she explained that the pt's hematologist has not been able to identify a specific explanation to date.    Recommendations:  - FOBT is not recommended. Interpretation of a positive test with underlying hemolytic anemia on heparin is extremely limited for this patient.  - Recommend formal hematology consultation given hemolytic anemia/thrombocytopenia without a clear explanation.  Discussed with vascular surgery resident and PA this AM.  - Would prioritize CEA and hematologic evaluation over endoscopic eval of anemia at this time in the absence of clinical bleeding.  Once stable and recovered, we can consider appropriateness of EGD/Colonoscopy during this admission.    GI will follow peripherally pending above.    Discussed with attending Dr. Heather Morales, DO  Gastroenterology Fellow  Please message on [employer] Teams  After 5PM or on weekends, please contact [place]  for fellow on call

## 2024-09-23 NOTE — DISCHARGE NOTE NURSING/CASE MANAGEMENT/SOCIAL WORK - PATIENT PORTAL LINK FT
You can access the FollowMyHealth Patient Portal offered by [facility] by registering at the following website: [email or web address]. By joining [facility]’s FollowMyHealth portal, you will also be able to view your health information using other applications (apps) compatible with our system.

## 2024-09-23 NOTE — PHYSICAL THERAPY INITIAL EVALUATION ADULT - PERTINENT HX OF CURRENT PROBLEM, REHAB EVAL
84 year old male s/p Carotid duplex done in office with R ICA >80% stenosis with soft plaque, velocities 501/111 cm/s, L ICA with <50% stenosis, velocities up to 122/38 cm/s. Per daughter, 1 month ago patient began complaining of intermittent LUE numbness, and then began having LUE weakness along with LLE numbness and mild weakness 1 week later which prompted further work up. Patient then found to have stroke on CT. Patient planned for cardiac cath 9/20 and R CEA.

## 2024-09-23 NOTE — PRE-ANESTHESIA EVALUATION ADULT - NSANTHPMHFT_GEN_ALL_CORE
81yo M former smoker, with pmhx of HTN, HLD, COPD, ENEDINA (home bipap), AVR (mechanical - on Coumadin goal 2.5-3.5), HFpEF, paroxysmal Afib, PAD, ITP (followed by hematologist Dr. Aden Saeed, baseline platelets 70k), CKD  (baseline Cr 1.2-1.3), scoliosis, cardiac arrest in 1995 2/2 V Tach s/p ICD placed, cardiac arrest early 2000s received shocks from  ICD, b/l carotid stenosis and stroke (R subacute to chronic infarct in occipital lobe, and small infarcts, ?age in R thalamus, and R MCA/PCA watershed). Carotid duplex done in office with R ICA >80% stenosis with soft plaque, velocities 501/111 cm/s, L ICA with <50% stenosis, velocities up to 122/38 cm/s. Per daughter, 1 month ago patient began complaining of intermittent LUE numbness, and then began having LUE weakness along with LLE numbness and mild weaknes

## 2024-09-23 NOTE — OCCUPATIONAL THERAPY INITIAL EVALUATION ADULT - MODIFIED CLINICAL TEST OF SENSORY INTEGRATION IN BALANCE TEST
pt. performed functional mob in hallway with Min Ax2, trial RW but unable to follow commands for use, transition to Min Ax2 and b/l hand held assist.  Pt. noted w. reduced coordination, step length and BRISEIDA

## 2024-09-23 NOTE — OCCUPATIONAL THERAPY INITIAL EVALUATION ADULT - GENERAL OBSERVATIONS, REHAB EVAL
OT IE complete. RN [facility] clearing pt. for session. PT Yesica present. Pt. received semi-supine in bed, +heplock, +tele, +[place] w. family bedside in NAD, agreeable to therapy session.

## 2024-09-23 NOTE — PHYSICAL THERAPY INITIAL EVALUATION ADULT - ADDITIONAL COMMENTS
patient has been staying with his family in an elevator apt since his recent stroke. They were bringing him to outpatient PT. He has been ambulating with a cane with assist. Daughter, son and wife present in room during assessment. Right handed. patient has been staying with his family in an elevator apt since his recent stroke. They were bringing him to outpatient PT. He has been ambulating with a cane with assist. Patient has transport W/C. Prior to living with his children he was living with his wife in a home with [facility].

## 2024-09-23 NOTE — PHYSICAL THERAPY INITIAL EVALUATION ADULT - GAIT DEVIATIONS NOTED, PT EVAL
unsteady gait with fluctuating base of support, verbal cues to pacing and sequencing, appeared ataxic at times, difficulty to direct patient and he is hard of hearing

## 2024-09-23 NOTE — PROGRESS NOTE ADULT - SUBJECTIVE AND OBJECTIVE BOX
Pt seen and examined at bedside.  Resting comfortably. NAD  NAEO    Allergies    Toprol-XL (Unknown)    Intolerances        MEDICATIONS:  MEDICATIONS  (STANDING):  acetaminophen     Tablet .. 650 milliGRAM(s) Oral once  aMIOdarone    Tablet 100 milliGRAM(s) Oral daily  atenolol  Tablet 12.5 milliGRAM(s) Oral daily  atorvastatin 80 milliGRAM(s) Oral at bedtime  brimonidine 0.2% Ophthalmic Solution 1 Drop(s) Right EYE two times a day  diphenhydrAMINE 25 milliGRAM(s) Oral daily  dorzolamide 2% Ophthalmic Solution 1 Drop(s) Right EYE every 8 hours  hydrocortisone 1% Cream 1 Application(s) Topical at bedtime  immune   globulin 10% (GAMMAGARD) IVPB 30 Gram(s) IV Intermittent every 24 hours  latanoprost 0.005% Ophthalmic Solution 1 Drop(s) Both EYES at bedtime  NIFEdipine XL 30 milliGRAM(s) Oral daily  pantoprazole    Tablet 40 milliGRAM(s) Oral before breakfast  polyethylene glycol 3350 17 Gram(s) Oral daily  senna 1 Tablet(s) Oral at bedtime  spironolactone 25 milliGRAM(s) Oral daily  tamsulosin 0.4 milliGRAM(s) Oral at bedtime  tiotropium 2.5 MICROgram(s)/olodaterol 2.5 MICROgram(s) (STIOLTO) Inhaler 2 Puff(s) Inhalation daily    MEDICATIONS  (PRN):  acetaminophen     Tablet .. 650 milliGRAM(s) Oral every 6 hours PRN Mild Pain (1 - 3)  albuterol/ipratropium for Nebulization 3 milliLiter(s) Nebulizer every 6 hours PRN Shortness of Breath and/or Wheezing      Vital Signs Last 24 Hrs  T(C): 36.9 (23 Sep 2024 08:47), Max: 36.9 (23 Sep 2024 08:47)  T(F): 98.5 (23 Sep 2024 08:47), Max: 98.5 (23 Sep 2024 08:47)  HR: 50 (23 Sep 2024 08:47) (50 - 64)  BP: 134/52 (23 Sep 2024 08:47) (128/62 - 176/76)  BP(mean): 79 (23 Sep 2024 08:47) (79 - 95)  RR: 18 (23 Sep 2024 08:47) (16 - 18)  SpO2: 95% (23 Sep 2024 08:47) (95% - 99%)    Parameters below as of 23 Sep 2024 08:47  Patient On (Oxygen Delivery Method): room air        09-22 @ 07:01  -  09-23 @ 07:00  --------------------------------------------------------  IN: 2063 mL / OUT: 2350 mL / NET: -287 mL    09-23 @ 07:01  -  09-23 @ 09:45  --------------------------------------------------------  IN: 0 mL / OUT: 400 mL / NET: -400 mL        PHYSICAL EXAM:    General: No acute distress  Pulm: Normal resp effort  Gastrointestinal: ND    LABS:  CBC Full  -  ( 23 Sep 2024 05:30 )  WBC Count : 4.92 K/uL  RBC Count : 3.57 M/uL  Hemoglobin : 9.1 g/dL  Hematocrit : 29.7 %  Platelet Count - Automated : 62 K/uL  Mean Cell Volume : 83.2 fl  Mean Cell Hemoglobin : 25.5 pg  Mean Cell Hemoglobin Concentration : 30.6 gm/dL  Auto Neutrophil # : x  Auto Lymphocyte # : x  Auto Monocyte # : x  Auto Eosinophil # : x  Auto Basophil # : x  Auto Neutrophil % : x  Auto Lymphocyte % : x  Auto Monocyte % : x  Auto Eosinophil % : x  Auto Basophil % : x    09-23    136  |  103  |  21  ----------------------------<  103[H]  4.2   |  24  |  1.32[H]    Ca    9.1      23 Sep 2024 05:30  Phos  3.1     09-23  Mg     1.8     09-23      PT/INR - ( 23 Sep 2024 05:30 )   PT: 12.7 sec;   INR: 1.12          PTT - ( 23 Sep 2024 05:30 )  PTT:60.3 sec      Urinalysis Basic - ( 23 Sep 2024 05:30 )    Color: x / Appearance: x / SG: x / pH: x  Gluc: 103 mg/dL / Ketone: x  / Bili: x / Urobili: x   Blood: x / Protein: x / Nitrite: x   Leuk Esterase: x / RBC: x / WBC x   Sq Epi: x / Non Sq Epi: x / Bacteria: x                RADIOLOGY & ADDITIONAL STUDIES:

## 2024-09-23 NOTE — PROGRESS NOTE ADULT - PROVIDER SPECIALTY LIST ADULT
Hospitalist
Vascular Surgery
Vascular Surgery
Intervent Cardiology
Vascular Surgery
Vascular Surgery
Gastroenterology

## 2024-09-23 NOTE — OCCUPATIONAL THERAPY INITIAL EVALUATION ADULT - DIAGNOSIS, OT EVAL
Pt. admitted to [facility] for management of R ICA stenosis, pending R CEA. Upon assessment, pt. demo impaired balance, coordination, activity tolerance and intermittent dizziness impacting engagement in ADLs and functional mob/transfers. Pt. admitted to [facility] for management of R ICA stenosis, pending R CEA. Pt. s/p cardiac cath on 9/20. Upon assessment, pt. demo impaired balance, coordination, activity tolerance and intermittent dizziness impacting engagement in ADLs and functional mob/transfers.

## 2024-09-23 NOTE — PROGRESS NOTE ADULT - ASSESSMENT
82-year-old male with a PMHx of HTN, HLD, ENEDINA, COPD, AVR (on Warfarin), HFpEF, pAfib, PAD, ITP, CKD (baseline Cr 1.2 - 1.3), VTach (s/p ICD), CVA and FRNACY who presented for right CEA.       #Carotid Artery Stenosis     #PAD    -further management as per vascular surgery, s/p [facility] (9/20) and plan for right CEA this admission   -further management of anti-platelets and anti-coagulation as per primary team, cardiology and hematology     -currently on heparin gtt while holding warfarin   -continue Atorvastatin 80mg qhs      #ITP    #Hemolytic Anemia    -workup to date: iron studies (normal), hemolysis labs (suggestive of hemolysis), direct pilar (positive)  -currently on IVIG x 5-days (end date: 9/24) as per outpatient hematologist, Dr. Saeed   -recommend hematology consult while inpatient   -GI consulted, no plan for imminent endoscopic evaluation      #pAfib    -currently on heparin gtt while warfarin is held    -continue with Atenolol 12.5mg daily      #HTN    -continue with Spironolactone 25mg daily, Atenolol 12.5mg daily and Nifedipine 30mg daily (new medication)   -Lasix and Losartan held in terri-operative period     #COPD    -continue with Stiolto and PRN duonebs      #ENEDINA    -continue with nocturnal BiPAP (settings 18/14 as per outpatient pulmonology note)      #CKD (baseline Cr 1.2 - 1.3)    -Cr near baseline, continue to monitor with daily BMP      #Rash  -continue with topical hydrocortisone cream      #BPH   -continue with Tamsulosin 0.4mg qhs      #History of VTach (s/p ICD)   -continue with Amiodarone 100mg daily      DVT PPx: heparin gtt      Dispo: pending OR

## 2024-09-25 ENCOUNTER — NON-APPOINTMENT (OUTPATIENT)
Age: 84
End: 2024-09-25

## 2024-09-26 ENCOUNTER — APPOINTMENT (OUTPATIENT)
Dept: HEART AND VASCULAR | Facility: CLINIC | Age: 84
End: 2024-09-26

## 2024-09-26 ENCOUNTER — APPOINTMENT (OUTPATIENT)
Dept: NEUROSURGERY | Facility: CLINIC | Age: 84
End: 2024-09-26

## 2024-09-28 RX ORDER — TIOTROPIUM BROMIDE INHALATION SPRAY 3.12 UG/1
2 SPRAY, METERED RESPIRATORY (INHALATION)
Refills: 0 | DISCHARGE

## 2024-09-28 RX ORDER — TRAVOPROST (BENZALKONIUM) 0.004 %
1 DROPS OPHTHALMIC (EYE)
Refills: 0 | DISCHARGE

## 2024-09-28 RX ORDER — BRIMONIDINE TARTRATE 1 MG/ML
1 SOLUTION/ DROPS OPHTHALMIC
Refills: 0 | DISCHARGE

## 2024-09-28 RX ORDER — ATORVASTATIN CALCIUM 10 MG/1
1 TABLET, FILM COATED ORAL
Refills: 0 | DISCHARGE

## 2024-09-28 RX ORDER — FUROSEMIDE 10 MG/ML
1 INJECTION INTRAVENOUS
Refills: 0 | DISCHARGE

## 2024-09-28 RX ORDER — MECLIZINE HYDROCLORIDE 25 MG/1
1 TABLET ORAL
Refills: 0 | DISCHARGE

## 2024-09-28 RX ORDER — ATENOLOL 25 MG/1
12.5 TABLET ORAL
Refills: 0 | DISCHARGE

## 2024-09-28 RX ORDER — SPIRONOLACTONE 100 MG/1
1 TABLET, FILM COATED ORAL
Refills: 0 | DISCHARGE

## 2024-09-28 RX ORDER — LOSARTAN POTASSIUM 100 MG/1
1 TABLET, FILM COATED ORAL
Refills: 0 | DISCHARGE

## 2024-09-28 RX ORDER — AMIODARONE HYDROCHLORIDE 50 MG/ML
1 INJECTION, SOLUTION INTRAVENOUS
Refills: 0 | DISCHARGE

## 2024-09-28 RX ORDER — PANTOPRAZOLE SODIUM 40 MG/1
1 TABLET, DELAYED RELEASE ORAL
Refills: 0 | DISCHARGE

## 2024-09-28 RX ORDER — TAMSULOSIN HCL 0.4 MG
1 CAPSULE ORAL
Refills: 0 | DISCHARGE

## 2024-09-28 RX ORDER — ALBUTEROL 90 MCG
2 AEROSOL (GRAM) INHALATION
Refills: 0 | DISCHARGE

## 2024-09-29 ENCOUNTER — TRANSCRIPTION ENCOUNTER (OUTPATIENT)
Age: 84
End: 2024-09-29

## 2024-09-30 ENCOUNTER — TRANSCRIPTION ENCOUNTER (OUTPATIENT)
Age: 84
End: 2024-09-30

## 2024-09-30 ENCOUNTER — RESULT REVIEW (OUTPATIENT)
Age: 84
End: 2024-09-30

## 2024-10-01 DIAGNOSIS — H40.9 UNSPECIFIED GLAUCOMA: ICD-10-CM

## 2024-10-01 DIAGNOSIS — I48.0 PAROXYSMAL ATRIAL FIBRILLATION: ICD-10-CM

## 2024-10-01 DIAGNOSIS — Z86.73 PERSONAL HISTORY OF TRANSIENT ISCHEMIC ATTACK (TIA), AND CEREBRAL INFARCTION WITHOUT RESIDUAL DEFICITS: ICD-10-CM

## 2024-10-01 DIAGNOSIS — R21 RASH AND OTHER NONSPECIFIC SKIN ERUPTION: ICD-10-CM

## 2024-10-01 DIAGNOSIS — R42 DIZZINESS AND GIDDINESS: ICD-10-CM

## 2024-10-01 DIAGNOSIS — N40.0 BENIGN PROSTATIC HYPERPLASIA WITHOUT LOWER URINARY TRACT SYMPTOMS: ICD-10-CM

## 2024-10-01 DIAGNOSIS — D69.3 IMMUNE THROMBOCYTOPENIC PURPURA: ICD-10-CM

## 2024-10-01 DIAGNOSIS — J44.9 CHRONIC OBSTRUCTIVE PULMONARY DISEASE, UNSPECIFIED: ICD-10-CM

## 2024-10-01 DIAGNOSIS — D64.9 ANEMIA, UNSPECIFIED: ICD-10-CM

## 2024-10-01 DIAGNOSIS — Z87.891 PERSONAL HISTORY OF NICOTINE DEPENDENCE: ICD-10-CM

## 2024-10-01 DIAGNOSIS — N18.30 CHRONIC KIDNEY DISEASE, STAGE 3 UNSPECIFIED: ICD-10-CM

## 2024-10-01 DIAGNOSIS — I73.9 PERIPHERAL VASCULAR DISEASE, UNSPECIFIED: ICD-10-CM

## 2024-10-01 DIAGNOSIS — G47.33 OBSTRUCTIVE SLEEP APNEA (ADULT) (PEDIATRIC): ICD-10-CM

## 2024-10-01 DIAGNOSIS — D47.2 MONOCLONAL GAMMOPATHY: ICD-10-CM

## 2024-10-01 DIAGNOSIS — I50.32 CHRONIC DIASTOLIC (CONGESTIVE) HEART FAILURE: ICD-10-CM

## 2024-10-01 DIAGNOSIS — Z95.810 PRESENCE OF AUTOMATIC (IMPLANTABLE) CARDIAC DEFIBRILLATOR: ICD-10-CM

## 2024-10-01 DIAGNOSIS — K21.9 GASTRO-ESOPHAGEAL REFLUX DISEASE WITHOUT ESOPHAGITIS: ICD-10-CM

## 2024-10-01 DIAGNOSIS — Z79.01 LONG TERM (CURRENT) USE OF ANTICOAGULANTS: ICD-10-CM

## 2024-10-01 DIAGNOSIS — E78.5 HYPERLIPIDEMIA, UNSPECIFIED: ICD-10-CM

## 2024-10-01 DIAGNOSIS — Z86.74 PERSONAL HISTORY OF SUDDEN CARDIAC ARREST: ICD-10-CM

## 2024-10-01 DIAGNOSIS — E83.42 HYPOMAGNESEMIA: ICD-10-CM

## 2024-10-01 DIAGNOSIS — I65.21 OCCLUSION AND STENOSIS OF RIGHT CAROTID ARTERY: ICD-10-CM

## 2024-10-01 DIAGNOSIS — I63.231 CEREBRAL INFARCTION DUE TO UNSPECIFIED OCCLUSION OR STENOSIS OF RIGHT CAROTID ARTERIES: ICD-10-CM

## 2024-10-01 DIAGNOSIS — Z99.89 DEPENDENCE ON OTHER ENABLING MACHINES AND DEVICES: ICD-10-CM

## 2024-10-02 ENCOUNTER — TRANSCRIPTION ENCOUNTER (OUTPATIENT)
Age: 84
End: 2024-10-02

## 2024-10-04 ENCOUNTER — APPOINTMENT (OUTPATIENT)
Dept: VASCULAR SURGERY | Facility: CLINIC | Age: 84
End: 2024-10-04
Payer: MEDICARE

## 2024-10-04 VITALS
HEART RATE: 77 BPM | SYSTOLIC BLOOD PRESSURE: 136 MMHG | DIASTOLIC BLOOD PRESSURE: 62 MMHG | HEIGHT: 71 IN | WEIGHT: 175 LBS | BODY MASS INDEX: 24.5 KG/M2

## 2024-10-04 DIAGNOSIS — I63.239 CEREBRAL INFARC DUE TO UNSPEC OCCLUSION OR STENOSIS OF UNSPEC CAROTID ARTERY: ICD-10-CM

## 2024-10-04 DIAGNOSIS — I87.2 VENOUS INSUFFICIENCY (CHRONIC) (PERIPHERAL): ICD-10-CM

## 2024-10-04 DIAGNOSIS — Z87.891 PERSONAL HISTORY OF NICOTINE DEPENDENCE: ICD-10-CM

## 2024-10-04 PROCEDURE — 99024 POSTOP FOLLOW-UP VISIT: CPT

## 2024-10-04 RX ORDER — CLOBETASOL PROPIONATE 0.5 MG/G
0.05 OINTMENT TOPICAL DAILY
Qty: 1 | Refills: 3 | Status: ACTIVE | COMMUNITY
Start: 2024-10-04 | End: 1900-01-01

## 2024-10-08 NOTE — PHYSICAL EXAM
[Respiratory Effort] : normal respiratory effort [Alert] : alert [Oriented to Person] : oriented to person [Oriented to Place] : oriented to place [Oriented to Time] : oriented to time [Calm] : calm [de-identified] : WN/WD [FreeTextEntry1] : R neck incision healing well, sutures in place with mild swelling and small hematoma on distal aspect by drain site. [de-identified] : FROM, using rolling walker for transport/ambulation [de-identified] : See cardiovascular section

## 2024-10-08 NOTE — DISCUSSION/SUMMARY
[FreeTextEntry1] : 85 y/o M w/ symptomatic, high grade, R ICA stenosis (R subacute to chronic infarct in occipital lobe, and small foci infarcts, ?age in R thalamus and R MCA/PCA watershed). Now, s/p R CEA on 9/30/24. On exam, no focal neuro deficits. R neck incision healing well, sutures in place with mild swelling and small hematoma on distal aspect by drain site. /62, HR 77. All sutures were removed and steri strips applied. Steri strips to fall off on their own or pt to remove after 7 days. Shower daily, allow soap and water to run over the incision and pad dry. No more peroxide needed. Findings discussed with pt and family members. Small hematoma will resolve with time. Pt to move neck daily avoiding harsh movements but this is to avoid muscle/joint stiffness on the area. Increase activity progressively on a daily basis and continue/start PT/OT services. Lovenox/Coumadin bridge and INR monitoring discussed. He has upcoming appts with neuro, cards and EP. Pt's son and daughter know to contact woods with any questions/concerns. F/u in one month but will repeat US in 3 months.

## 2024-10-08 NOTE — REASON FOR VISIT
[de-identified] : MILTON [place]ON [de-identified] : 9/30/24 [de-identified] : 83 y/o M w/ PMHx of HTN, HLD, GERD, COPD (BiPAP), ENEDINA, AVR 1993 (mechanical, [place] Valve for AS with Dr Monroe - on Coumadin), cardiac arrest 2/2 V Tach and A Fib in 1995 s/p AICD placed. Amio started in 1998 for AICD shocks. Cardiac arrest #2 in early 2000s saved by AICD. AICD device changed in 2015. He has HFpEF, [id number], insomnia, ITP, anemia, thrombocytopenia, PAD (mild RLE), and scoliosis. Also, he has been recently diagnosed with symptomatic, high grade, R ICA stenosis (R subacute to chronic infarct in occipital lobe, and small foci infarcts, ?age in R thalamus and R MCA/PCA watershed) which presented with intermittent LUE numbness and then weakness along with LLE numbness and weakness. Preop he received IVIG therapy and has Lovenox bridged. Now, s/p R CEA on 9/30/24. Intraop, large amount of intra plaque mushy atheromatous debris including intra plaque hemorrhage. The plaque  was yellow and brown with brown intra plaque hemorrhage. He was clamp tolerant and procedure was under cervical block.   He presents for postop visit, accompanied by son and facetimed with daughter. Pt is in good spirits but in pain/discomfort, particularly on neck, upper back and has had some headaches (not one sided; all on top of the head). The headaches are relived by Tylenol and massage. No speech disturbances or new vision disturbances. Postop he had felt some LUE improvement in heaviness feeling which continues to improve. They are deciding on PT/OT services location home vs outpt or both and making arrangements. Daughter will be checking INR closely and hopefully soon Lovenox may be stopped.

## 2024-10-10 ENCOUNTER — APPOINTMENT (OUTPATIENT)
Dept: HEART AND VASCULAR | Facility: CLINIC | Age: 84
End: 2024-10-10

## 2024-10-11 ENCOUNTER — APPOINTMENT (OUTPATIENT)
Dept: VASCULAR SURGERY | Facility: CLINIC | Age: 84
End: 2024-10-11

## 2024-10-11 DIAGNOSIS — Z87.891 PERSONAL HISTORY OF NICOTINE DEPENDENCE: ICD-10-CM

## 2024-10-11 DIAGNOSIS — T14.8XXA OTHER INJURY OF UNSPECIFIED BODY REGION, INITIAL ENCOUNTER: ICD-10-CM

## 2024-10-11 DIAGNOSIS — I63.239 CEREBRAL INFARC DUE TO UNSPEC OCCLUSION OR STENOSIS OF UNSPEC CAROTID ARTERY: ICD-10-CM

## 2024-10-11 PROCEDURE — 99024 POSTOP FOLLOW-UP VISIT: CPT

## 2024-10-15 ENCOUNTER — NON-APPOINTMENT (OUTPATIENT)
Age: 84
End: 2024-10-15

## 2024-10-15 ENCOUNTER — APPOINTMENT (OUTPATIENT)
Dept: HEART AND VASCULAR | Facility: CLINIC | Age: 84
End: 2024-10-15
Payer: MEDICARE

## 2024-10-15 VITALS
SYSTOLIC BLOOD PRESSURE: 140 MMHG | DIASTOLIC BLOOD PRESSURE: 68 MMHG | BODY MASS INDEX: 23.66 KG/M2 | HEIGHT: 71 IN | WEIGHT: 169 LBS

## 2024-10-15 DIAGNOSIS — I48.0 PAROXYSMAL ATRIAL FIBRILLATION: ICD-10-CM

## 2024-10-15 DIAGNOSIS — R00.1 BRADYCARDIA, UNSPECIFIED: ICD-10-CM

## 2024-10-15 DIAGNOSIS — I47.20 VENTRICULAR TACHYCARDIA, UNSPECIFIED: ICD-10-CM

## 2024-10-15 DIAGNOSIS — Z79.899 OTHER LONG TERM (CURRENT) DRUG THERAPY: ICD-10-CM

## 2024-10-15 DIAGNOSIS — Z95.810 PRESENCE OF AUTOMATIC (IMPLANTABLE) CARDIAC DEFIBRILLATOR: ICD-10-CM

## 2024-10-15 PROCEDURE — 99204 OFFICE O/P NEW MOD 45 MIN: CPT

## 2024-10-15 PROCEDURE — 93282 PRGRMG EVAL IMPLANTABLE DFB: CPT

## 2024-10-17 ENCOUNTER — NON-APPOINTMENT (OUTPATIENT)
Age: 84
End: 2024-10-17

## 2024-10-18 ENCOUNTER — NON-APPOINTMENT (OUTPATIENT)
Age: 84
End: 2024-10-18

## 2024-10-20 PROBLEM — T14.8XXA HEMATOMA: Status: ACTIVE | Noted: 2024-10-20

## 2024-10-21 PROBLEM — Z79.899 LONG TERM CURRENT USE OF AMIODARONE: Status: ACTIVE | Noted: 2024-10-21

## 2024-10-22 ENCOUNTER — APPOINTMENT (OUTPATIENT)
Dept: INTERNAL MEDICINE | Facility: CLINIC | Age: 84
End: 2024-10-22
Payer: MEDICARE

## 2024-10-22 DIAGNOSIS — Z95.2 PRESENCE OF PROSTHETIC HEART VALVE: ICD-10-CM

## 2024-10-22 LAB
INR PPP: 1.5
INR PPP: 1.9
INR PPP: 3.6

## 2024-10-22 PROCEDURE — 99441: CPT | Mod: 93

## 2024-10-23 ENCOUNTER — NON-APPOINTMENT (OUTPATIENT)
Age: 84
End: 2024-10-23

## 2024-10-24 ENCOUNTER — NON-APPOINTMENT (OUTPATIENT)
Age: 84
End: 2024-10-24

## 2024-10-24 ENCOUNTER — APPOINTMENT (OUTPATIENT)
Dept: INTERNAL MEDICINE | Facility: CLINIC | Age: 84
End: 2024-10-24
Payer: MEDICARE

## 2024-10-24 PROCEDURE — 99441: CPT | Mod: 93

## 2024-10-25 LAB
INR PPP: 1.9
INR PPP: 1.9
INR PPP: 2

## 2024-10-30 ENCOUNTER — NON-APPOINTMENT (OUTPATIENT)
Age: 84
End: 2024-10-30

## 2024-10-30 ENCOUNTER — APPOINTMENT (OUTPATIENT)
Dept: INTERNAL MEDICINE | Facility: CLINIC | Age: 84
End: 2024-10-30
Payer: MEDICARE

## 2024-10-30 PROCEDURE — ZZZZZ: CPT

## 2024-10-31 LAB — INR PPP: 2.4

## 2024-11-04 ENCOUNTER — NON-APPOINTMENT (OUTPATIENT)
Age: 84
End: 2024-11-04

## 2024-11-04 ENCOUNTER — APPOINTMENT (OUTPATIENT)
Dept: INTERNAL MEDICINE | Facility: CLINIC | Age: 84
End: 2024-11-04
Payer: MEDICARE

## 2024-11-04 DIAGNOSIS — Z95.2 PRESENCE OF PROSTHETIC HEART VALVE: ICD-10-CM

## 2024-11-04 PROCEDURE — 93793 ANTICOAG MGMT PT WARFARIN: CPT

## 2024-11-05 LAB — INR PPP: 3

## 2024-11-07 ENCOUNTER — NON-APPOINTMENT (OUTPATIENT)
Age: 84
End: 2024-11-07

## 2024-11-07 ENCOUNTER — APPOINTMENT (OUTPATIENT)
Dept: HEART AND VASCULAR | Facility: CLINIC | Age: 84
End: 2024-11-07
Payer: MEDICARE

## 2024-11-07 VITALS
BODY MASS INDEX: 22.82 KG/M2 | TEMPERATURE: 98 F | HEIGHT: 71 IN | DIASTOLIC BLOOD PRESSURE: 84 MMHG | WEIGHT: 163 LBS | SYSTOLIC BLOOD PRESSURE: 164 MMHG | OXYGEN SATURATION: 97 % | HEART RATE: 74 BPM

## 2024-11-07 DIAGNOSIS — E78.00 PURE HYPERCHOLESTEROLEMIA, UNSPECIFIED: ICD-10-CM

## 2024-11-07 DIAGNOSIS — I50.30 UNSPECIFIED DIASTOLIC (CONGESTIVE) HEART FAILURE: ICD-10-CM

## 2024-11-07 DIAGNOSIS — I10 ESSENTIAL (PRIMARY) HYPERTENSION: ICD-10-CM

## 2024-11-07 DIAGNOSIS — I71.20 THORACIC AORTIC ANEURYSM, WITHOUT RUPTURE, UNSPECIFIED: ICD-10-CM

## 2024-11-07 PROCEDURE — 99214 OFFICE O/P EST MOD 30 MIN: CPT

## 2024-11-07 RX ORDER — ATORVASTATIN CALCIUM 80 MG/1
80 TABLET, FILM COATED ORAL DAILY
Refills: 0 | Status: ACTIVE | COMMUNITY

## 2024-11-07 RX ORDER — FUROSEMIDE 20 MG/1
20 TABLET ORAL
Qty: 24 | Refills: 1 | Status: ACTIVE | COMMUNITY

## 2024-11-12 ENCOUNTER — APPOINTMENT (OUTPATIENT)
Dept: OPHTHALMOLOGY | Facility: CLINIC | Age: 84
End: 2024-11-12
Payer: MEDICARE

## 2024-11-12 ENCOUNTER — NON-APPOINTMENT (OUTPATIENT)
Age: 84
End: 2024-11-12

## 2024-11-12 ENCOUNTER — APPOINTMENT (OUTPATIENT)
Dept: INTERNAL MEDICINE | Facility: CLINIC | Age: 84
End: 2024-11-12
Payer: MEDICARE

## 2024-11-12 DIAGNOSIS — Z79.01 LONG TERM (CURRENT) USE OF ANTICOAGULANTS: ICD-10-CM

## 2024-11-12 DIAGNOSIS — I48.0 PAROXYSMAL ATRIAL FIBRILLATION: ICD-10-CM

## 2024-11-12 DIAGNOSIS — Z79.01 ENCOUNTER FOR THERAPEUTIC DRUG LVL MONITORING: ICD-10-CM

## 2024-11-12 DIAGNOSIS — Z79.899 OTHER LONG TERM (CURRENT) DRUG THERAPY: ICD-10-CM

## 2024-11-12 DIAGNOSIS — Z51.81 ENCOUNTER FOR THERAPEUTIC DRUG LVL MONITORING: ICD-10-CM

## 2024-11-12 LAB — INR PPP: 2.6

## 2024-11-12 PROCEDURE — 92250 FUNDUS PHOTOGRAPHY W/I&R: CPT

## 2024-11-12 PROCEDURE — 92060 SENSORIMOTOR EXAMINATION: CPT

## 2024-11-12 PROCEDURE — 92014 COMPRE OPH EXAM EST PT 1/>: CPT

## 2024-11-12 PROCEDURE — 92083 EXTENDED VISUAL FIELD XM: CPT

## 2024-11-12 PROCEDURE — 93793 ANTICOAG MGMT PT WARFARIN: CPT

## 2024-11-13 ENCOUNTER — APPOINTMENT (OUTPATIENT)
Dept: VASCULAR SURGERY | Facility: CLINIC | Age: 84
End: 2024-11-13
Payer: MEDICARE

## 2024-11-13 VITALS
SYSTOLIC BLOOD PRESSURE: 133 MMHG | BODY MASS INDEX: 22.82 KG/M2 | DIASTOLIC BLOOD PRESSURE: 61 MMHG | WEIGHT: 163 LBS | HEART RATE: 67 BPM | HEIGHT: 71 IN

## 2024-11-13 DIAGNOSIS — I63.239 CEREBRAL INFARC DUE TO UNSPEC OCCLUSION OR STENOSIS OF UNSPEC CAROTID ARTERY: ICD-10-CM

## 2024-11-13 DIAGNOSIS — Z87.891 PERSONAL HISTORY OF NICOTINE DEPENDENCE: ICD-10-CM

## 2024-11-13 PROCEDURE — 99024 POSTOP FOLLOW-UP VISIT: CPT

## 2024-11-19 ENCOUNTER — APPOINTMENT (OUTPATIENT)
Dept: NEUROLOGY | Facility: CLINIC | Age: 84
End: 2024-11-19
Payer: MEDICARE

## 2024-11-19 VITALS
TEMPERATURE: 97.8 F | WEIGHT: 164.6 LBS | DIASTOLIC BLOOD PRESSURE: 71 MMHG | OXYGEN SATURATION: 99 % | SYSTOLIC BLOOD PRESSURE: 155 MMHG | HEART RATE: 76 BPM | BODY MASS INDEX: 22.96 KG/M2

## 2024-11-19 PROCEDURE — 99213 OFFICE O/P EST LOW 20 MIN: CPT

## 2024-11-19 RX ORDER — BRIMONIDINE TARTRATE 2 MG/MG
0.2 SOLUTION/ DROPS OPHTHALMIC TWICE DAILY
Refills: 0 | Status: ACTIVE | COMMUNITY

## 2024-11-19 RX ORDER — ATORVASTATIN CALCIUM 80 MG/1
80 TABLET, FILM COATED ORAL
Refills: 0 | Status: ACTIVE | COMMUNITY

## 2024-11-19 RX ORDER — GABAPENTIN 100 MG/1
100 CAPSULE ORAL DAILY
Refills: 0 | Status: ACTIVE | COMMUNITY

## 2024-11-19 RX ORDER — DORZOLAMIDE HYDROCHLORIDE 20 MG/ML
2 SOLUTION OPHTHALMIC TWICE DAILY
Refills: 0 | Status: ACTIVE | COMMUNITY

## 2024-11-24 ENCOUNTER — OUTPATIENT (OUTPATIENT)
Dept: OUTPATIENT SERVICES | Facility: HOSPITAL | Age: 84
LOS: 1 days | End: 2024-11-24

## 2024-11-24 PROCEDURE — 70496 CT ANGIOGRAPHY HEAD: CPT

## 2024-11-24 PROCEDURE — 82565 ASSAY OF CREATININE: CPT

## 2024-11-24 PROCEDURE — 70498 CT ANGIOGRAPHY NECK: CPT

## 2024-11-24 PROCEDURE — 70450 CT HEAD/BRAIN W/O DYE: CPT

## 2024-11-25 ENCOUNTER — APPOINTMENT (OUTPATIENT)
Dept: OPHTHALMOLOGY | Facility: CLINIC | Age: 84
End: 2024-11-25

## 2024-11-25 ENCOUNTER — NON-APPOINTMENT (OUTPATIENT)
Age: 84
End: 2024-11-25

## 2024-11-25 DIAGNOSIS — I63.9 CEREBRAL INFARCTION, UNSPECIFIED: ICD-10-CM

## 2024-11-26 ENCOUNTER — NON-APPOINTMENT (OUTPATIENT)
Age: 84
End: 2024-11-26

## 2024-12-02 ENCOUNTER — APPOINTMENT (OUTPATIENT)
Dept: OTOLARYNGOLOGY | Facility: CLINIC | Age: 84
End: 2024-12-02
Payer: MEDICARE

## 2024-12-02 DIAGNOSIS — H61.23 IMPACTED CERUMEN, BILATERAL: ICD-10-CM

## 2024-12-02 DIAGNOSIS — J31.0 CHRONIC RHINITIS: ICD-10-CM

## 2024-12-02 DIAGNOSIS — H90.3 SENSORINEURAL HEARING LOSS, BILATERAL: ICD-10-CM

## 2024-12-02 LAB — INR PPP: 3

## 2024-12-02 PROCEDURE — 92567 TYMPANOMETRY: CPT

## 2024-12-02 PROCEDURE — 99204 OFFICE O/P NEW MOD 45 MIN: CPT | Mod: 25

## 2024-12-02 PROCEDURE — G0268 REMOVAL OF IMPACTED WAX MD: CPT

## 2024-12-02 PROCEDURE — 31231 NASAL ENDOSCOPY DX: CPT

## 2024-12-02 PROCEDURE — 92555 SPEECH THRESHOLD AUDIOMETRY: CPT

## 2024-12-02 PROCEDURE — 92553 AUDIOMETRY AIR & BONE: CPT

## 2024-12-04 ENCOUNTER — APPOINTMENT (OUTPATIENT)
Dept: PULMONOLOGY | Facility: CLINIC | Age: 84
End: 2024-12-04

## 2024-12-20 ENCOUNTER — NON-APPOINTMENT (OUTPATIENT)
Age: 84
End: 2024-12-20

## 2024-12-27 LAB — INR PPP: 2.8

## 2025-01-08 ENCOUNTER — APPOINTMENT (OUTPATIENT)
Dept: VASCULAR SURGERY | Facility: CLINIC | Age: 85
End: 2025-01-08

## 2025-01-08 VITALS
WEIGHT: 164 LBS | HEART RATE: 56 BPM | DIASTOLIC BLOOD PRESSURE: 64 MMHG | BODY MASS INDEX: 22.96 KG/M2 | HEIGHT: 71 IN | SYSTOLIC BLOOD PRESSURE: 147 MMHG

## 2025-01-08 DIAGNOSIS — Z87.891 PERSONAL HISTORY OF NICOTINE DEPENDENCE: ICD-10-CM

## 2025-01-08 DIAGNOSIS — I63.239 CEREBRAL INFARC DUE TO UNSPEC OCCLUSION OR STENOSIS OF UNSPEC CAROTID ARTERY: ICD-10-CM

## 2025-01-08 PROCEDURE — 93880 EXTRACRANIAL BILAT STUDY: CPT

## 2025-01-08 PROCEDURE — 99212 OFFICE O/P EST SF 10 MIN: CPT

## 2025-01-16 ENCOUNTER — APPOINTMENT (OUTPATIENT)
Dept: HEART AND VASCULAR | Facility: CLINIC | Age: 85
End: 2025-01-16

## 2025-01-21 ENCOUNTER — APPOINTMENT (OUTPATIENT)
Dept: NEUROLOGY | Facility: CLINIC | Age: 85
End: 2025-01-21
Payer: MEDICARE

## 2025-01-21 ENCOUNTER — NON-APPOINTMENT (OUTPATIENT)
Age: 85
End: 2025-01-21

## 2025-01-21 DIAGNOSIS — R42 DIZZINESS AND GIDDINESS: ICD-10-CM

## 2025-01-21 DIAGNOSIS — I63.9 CEREBRAL INFARCTION, UNSPECIFIED: ICD-10-CM

## 2025-01-21 PROCEDURE — 99214 OFFICE O/P EST MOD 30 MIN: CPT | Mod: 2W

## 2025-01-23 ENCOUNTER — APPOINTMENT (OUTPATIENT)
Dept: NEUROLOGY | Facility: CLINIC | Age: 85
End: 2025-01-23
Payer: MEDICARE

## 2025-01-23 PROCEDURE — 93886 INTRACRANIAL COMPLETE STUDY: CPT

## 2025-01-24 ENCOUNTER — NON-APPOINTMENT (OUTPATIENT)
Age: 85
End: 2025-01-24

## 2025-01-24 ENCOUNTER — APPOINTMENT (OUTPATIENT)
Dept: INTERNAL MEDICINE | Facility: CLINIC | Age: 85
End: 2025-01-24

## 2025-01-24 DIAGNOSIS — Z51.81 ENCOUNTER FOR THERAPEUTIC DRUG LVL MONITORING: ICD-10-CM

## 2025-01-24 DIAGNOSIS — Z79.01 ENCOUNTER FOR THERAPEUTIC DRUG LVL MONITORING: ICD-10-CM

## 2025-01-24 DIAGNOSIS — Z79.01 LONG TERM (CURRENT) USE OF ANTICOAGULANTS: ICD-10-CM

## 2025-01-24 PROCEDURE — ZZZZZ: CPT

## 2025-01-30 LAB — INR PPP: 2.7

## 2025-01-31 ENCOUNTER — TRANSCRIPTION ENCOUNTER (OUTPATIENT)
Age: 85
End: 2025-01-31

## 2025-02-10 ENCOUNTER — APPOINTMENT (OUTPATIENT)
Dept: NEUROLOGY | Facility: CLINIC | Age: 85
End: 2025-02-10

## 2025-02-12 ENCOUNTER — APPOINTMENT (OUTPATIENT)
Dept: NEUROLOGY | Facility: CLINIC | Age: 85
End: 2025-02-12
Payer: MEDICARE

## 2025-02-12 PROCEDURE — 99214 OFFICE O/P EST MOD 30 MIN: CPT | Mod: 2W

## 2025-02-13 ENCOUNTER — APPOINTMENT (OUTPATIENT)
Dept: HEART AND VASCULAR | Facility: CLINIC | Age: 85
End: 2025-02-13
Payer: MEDICARE

## 2025-02-13 ENCOUNTER — NON-APPOINTMENT (OUTPATIENT)
Age: 85
End: 2025-02-13

## 2025-02-13 VITALS
HEIGHT: 71 IN | WEIGHT: 156 LBS | HEART RATE: 82 BPM | TEMPERATURE: 97.7 F | BODY MASS INDEX: 21.84 KG/M2 | DIASTOLIC BLOOD PRESSURE: 62 MMHG | SYSTOLIC BLOOD PRESSURE: 118 MMHG | OXYGEN SATURATION: 99 %

## 2025-02-13 DIAGNOSIS — I50.30 UNSPECIFIED DIASTOLIC (CONGESTIVE) HEART FAILURE: ICD-10-CM

## 2025-02-13 DIAGNOSIS — I10 ESSENTIAL (PRIMARY) HYPERTENSION: ICD-10-CM

## 2025-02-13 DIAGNOSIS — I48.0 PAROXYSMAL ATRIAL FIBRILLATION: ICD-10-CM

## 2025-02-13 DIAGNOSIS — E78.00 PURE HYPERCHOLESTEROLEMIA, UNSPECIFIED: ICD-10-CM

## 2025-02-13 DIAGNOSIS — I71.20 THORACIC AORTIC ANEURYSM, WITHOUT RUPTURE, UNSPECIFIED: ICD-10-CM

## 2025-02-13 DIAGNOSIS — Z95.2 PRESENCE OF PROSTHETIC HEART VALVE: ICD-10-CM

## 2025-02-13 PROCEDURE — 99214 OFFICE O/P EST MOD 30 MIN: CPT

## 2025-02-20 ENCOUNTER — APPOINTMENT (OUTPATIENT)
Dept: HEART AND VASCULAR | Facility: CLINIC | Age: 85
End: 2025-02-20

## 2025-03-04 ENCOUNTER — APPOINTMENT (OUTPATIENT)
Dept: OPHTHALMOLOGY | Facility: CLINIC | Age: 85
End: 2025-03-04

## 2025-03-06 ENCOUNTER — TRANSCRIPTION ENCOUNTER (OUTPATIENT)
Age: 85
End: 2025-03-06

## 2025-03-10 ENCOUNTER — TRANSCRIPTION ENCOUNTER (OUTPATIENT)
Age: 85
End: 2025-03-10

## 2025-03-21 ENCOUNTER — APPOINTMENT (OUTPATIENT)
Dept: PULMONOLOGY | Facility: CLINIC | Age: 85
End: 2025-03-21
Payer: MEDICARE

## 2025-03-21 DIAGNOSIS — G47.33 OBSTRUCTIVE SLEEP APNEA (ADULT) (PEDIATRIC): ICD-10-CM

## 2025-03-21 DIAGNOSIS — I63.9 CEREBRAL INFARCTION, UNSPECIFIED: ICD-10-CM

## 2025-03-21 DIAGNOSIS — F51.04 PSYCHOPHYSIOLOGIC INSOMNIA: ICD-10-CM

## 2025-03-21 PROCEDURE — G2211 COMPLEX E/M VISIT ADD ON: CPT | Mod: 2W

## 2025-03-21 PROCEDURE — 99214 OFFICE O/P EST MOD 30 MIN: CPT | Mod: 2W

## 2025-03-21 RX ORDER — DOXEPIN 3 MG/1
3 TABLET, FILM COATED ORAL
Qty: 30 | Refills: 0 | Status: ACTIVE | COMMUNITY
Start: 2025-03-21 | End: 1900-01-01

## 2025-03-24 ENCOUNTER — APPOINTMENT (OUTPATIENT)
Dept: HEART AND VASCULAR | Facility: CLINIC | Age: 85
End: 2025-03-24
Payer: MEDICARE

## 2025-03-24 ENCOUNTER — NON-APPOINTMENT (OUTPATIENT)
Age: 85
End: 2025-03-24

## 2025-03-24 PROCEDURE — 93295 DEV INTERROG REMOTE 1/2/MLT: CPT

## 2025-03-24 PROCEDURE — 93296 REM INTERROG EVL PM/IDS: CPT

## 2025-03-25 ENCOUNTER — TRANSCRIPTION ENCOUNTER (OUTPATIENT)
Age: 85
End: 2025-03-25

## 2025-04-01 ENCOUNTER — TRANSCRIPTION ENCOUNTER (OUTPATIENT)
Age: 85
End: 2025-04-01

## 2025-04-03 ENCOUNTER — APPOINTMENT (OUTPATIENT)
Dept: PULMONOLOGY | Facility: CLINIC | Age: 85
End: 2025-04-03
Payer: MEDICARE

## 2025-04-03 DIAGNOSIS — F51.04 PSYCHOPHYSIOLOGIC INSOMNIA: ICD-10-CM

## 2025-04-03 DIAGNOSIS — G47.33 OBSTRUCTIVE SLEEP APNEA (ADULT) (PEDIATRIC): ICD-10-CM

## 2025-04-03 PROCEDURE — 99214 OFFICE O/P EST MOD 30 MIN: CPT | Mod: 93

## 2025-04-03 PROCEDURE — G2211 COMPLEX E/M VISIT ADD ON: CPT | Mod: 93

## 2025-04-04 ENCOUNTER — TRANSCRIPTION ENCOUNTER (OUTPATIENT)
Age: 85
End: 2025-04-04

## 2025-04-07 ENCOUNTER — TRANSCRIPTION ENCOUNTER (OUTPATIENT)
Age: 85
End: 2025-04-07

## 2025-04-28 ENCOUNTER — APPOINTMENT (OUTPATIENT)
Dept: NEUROLOGY | Facility: CLINIC | Age: 85
End: 2025-04-28

## 2025-04-28 ENCOUNTER — TRANSCRIPTION ENCOUNTER (OUTPATIENT)
Age: 85
End: 2025-04-28

## 2025-05-05 ENCOUNTER — APPOINTMENT (OUTPATIENT)
Dept: NEUROLOGY | Facility: CLINIC | Age: 85
End: 2025-05-05
Payer: MEDICARE

## 2025-05-05 VITALS
WEIGHT: 155 LBS | HEIGHT: 70 IN | DIASTOLIC BLOOD PRESSURE: 67 MMHG | OXYGEN SATURATION: 98 % | HEART RATE: 67 BPM | TEMPERATURE: 96.7 F | BODY MASS INDEX: 22.19 KG/M2 | SYSTOLIC BLOOD PRESSURE: 157 MMHG

## 2025-05-05 DIAGNOSIS — F05 UNSPECIFIED DEMENTIA W/OUT BEHAVIORAL DISTURBANCE: ICD-10-CM

## 2025-05-05 DIAGNOSIS — F03.90 UNSPECIFIED DEMENTIA W/OUT BEHAVIORAL DISTURBANCE: ICD-10-CM

## 2025-05-05 DIAGNOSIS — I63.9 CEREBRAL INFARCTION, UNSPECIFIED: ICD-10-CM

## 2025-05-05 PROCEDURE — 99215 OFFICE O/P EST HI 40 MIN: CPT

## 2025-05-05 PROCEDURE — G2211 COMPLEX E/M VISIT ADD ON: CPT

## 2025-05-06 ENCOUNTER — APPOINTMENT (OUTPATIENT)
Dept: HEART AND VASCULAR | Facility: CLINIC | Age: 85
End: 2025-05-06
Payer: MEDICARE

## 2025-05-06 VITALS
HEART RATE: 66 BPM | OXYGEN SATURATION: 99 % | DIASTOLIC BLOOD PRESSURE: 66 MMHG | TEMPERATURE: 98.7 F | HEIGHT: 70 IN | SYSTOLIC BLOOD PRESSURE: 140 MMHG

## 2025-05-06 DIAGNOSIS — Z95.2 PRESENCE OF PROSTHETIC HEART VALVE: ICD-10-CM

## 2025-05-06 DIAGNOSIS — I10 ESSENTIAL (PRIMARY) HYPERTENSION: ICD-10-CM

## 2025-05-06 DIAGNOSIS — I71.20 THORACIC AORTIC ANEURYSM, WITHOUT RUPTURE, UNSPECIFIED: ICD-10-CM

## 2025-05-06 DIAGNOSIS — E78.00 PURE HYPERCHOLESTEROLEMIA, UNSPECIFIED: ICD-10-CM

## 2025-05-06 DIAGNOSIS — I50.30 UNSPECIFIED DIASTOLIC (CONGESTIVE) HEART FAILURE: ICD-10-CM

## 2025-05-06 DIAGNOSIS — I48.0 PAROXYSMAL ATRIAL FIBRILLATION: ICD-10-CM

## 2025-05-06 PROBLEM — F03.90 DELIRIUM WITH DEMENTIA: Status: ACTIVE | Noted: 2025-05-06

## 2025-05-06 PROCEDURE — 99214 OFFICE O/P EST MOD 30 MIN: CPT

## 2025-05-09 RX ORDER — OLANZAPINE 2.5 MG/1
2.5 TABLET, FILM COATED ORAL
Qty: 90 | Refills: 1 | Status: ACTIVE | COMMUNITY
Start: 2025-05-06 | End: 1900-01-01

## 2025-05-13 ENCOUNTER — NON-APPOINTMENT (OUTPATIENT)
Age: 85
End: 2025-05-13

## 2025-05-27 ENCOUNTER — APPOINTMENT (OUTPATIENT)
Dept: HEART AND VASCULAR | Facility: CLINIC | Age: 85
End: 2025-05-27

## 2025-06-12 PROBLEM — G47.00 INSOMNIA: Status: ACTIVE | Noted: 2025-06-12

## 2025-06-23 ENCOUNTER — NON-APPOINTMENT (OUTPATIENT)
Age: 85
End: 2025-06-23

## 2025-06-23 ENCOUNTER — APPOINTMENT (OUTPATIENT)
Dept: HEART AND VASCULAR | Facility: CLINIC | Age: 85
End: 2025-06-23
Payer: MEDICARE

## 2025-06-23 PROCEDURE — 93296 REM INTERROG EVL PM/IDS: CPT

## 2025-06-23 PROCEDURE — 93295 DEV INTERROG REMOTE 1/2/MLT: CPT

## 2025-07-08 ENCOUNTER — APPOINTMENT (OUTPATIENT)
Dept: HEART AND VASCULAR | Facility: CLINIC | Age: 85
End: 2025-07-08

## 2025-08-26 ENCOUNTER — NON-APPOINTMENT (OUTPATIENT)
Age: 85
End: 2025-08-26

## 2025-09-16 ENCOUNTER — NON-APPOINTMENT (OUTPATIENT)
Age: 85
End: 2025-09-16